# Patient Record
Sex: MALE | Race: BLACK OR AFRICAN AMERICAN | NOT HISPANIC OR LATINO
[De-identification: names, ages, dates, MRNs, and addresses within clinical notes are randomized per-mention and may not be internally consistent; named-entity substitution may affect disease eponyms.]

---

## 2018-01-09 ENCOUNTER — APPOINTMENT (OUTPATIENT)
Dept: SURGERY | Facility: CLINIC | Age: 83
End: 2018-01-09

## 2018-02-06 ENCOUNTER — APPOINTMENT (OUTPATIENT)
Dept: SURGERY | Facility: CLINIC | Age: 83
End: 2018-02-06

## 2018-02-13 ENCOUNTER — APPOINTMENT (OUTPATIENT)
Dept: SURGERY | Facility: CLINIC | Age: 83
End: 2018-02-13
Payer: COMMERCIAL

## 2018-02-13 VITALS
TEMPERATURE: 97.8 F | BODY MASS INDEX: 24.8 KG/M2 | SYSTOLIC BLOOD PRESSURE: 141 MMHG | HEIGHT: 67 IN | WEIGHT: 158 LBS | HEART RATE: 85 BPM | OXYGEN SATURATION: 99 % | DIASTOLIC BLOOD PRESSURE: 97 MMHG

## 2018-02-13 PROCEDURE — 99204 OFFICE O/P NEW MOD 45 MIN: CPT

## 2018-08-22 ENCOUNTER — EMERGENCY (EMERGENCY)
Facility: HOSPITAL | Age: 83
LOS: 1 days | Discharge: HOME | End: 2018-08-22
Attending: EMERGENCY MEDICINE | Admitting: EMERGENCY MEDICINE
Payer: MEDICAID

## 2018-08-22 VITALS
OXYGEN SATURATION: 100 % | SYSTOLIC BLOOD PRESSURE: 160 MMHG | HEART RATE: 59 BPM | DIASTOLIC BLOOD PRESSURE: 89 MMHG | RESPIRATION RATE: 16 BRPM | TEMPERATURE: 99 F

## 2018-08-22 VITALS
HEART RATE: 66 BPM | OXYGEN SATURATION: 99 % | DIASTOLIC BLOOD PRESSURE: 88 MMHG | RESPIRATION RATE: 18 BRPM | SYSTOLIC BLOOD PRESSURE: 155 MMHG

## 2018-08-22 LAB
ALBUMIN SERPL ELPH-MCNC: 4 G/DL — SIGNIFICANT CHANGE UP (ref 3.3–5)
ALP SERPL-CCNC: 56 U/L — SIGNIFICANT CHANGE UP (ref 40–120)
ALT FLD-CCNC: 10 U/L — SIGNIFICANT CHANGE UP (ref 4–41)
APPEARANCE UR: CLEAR — SIGNIFICANT CHANGE UP
AST SERPL-CCNC: 27 U/L — SIGNIFICANT CHANGE UP (ref 4–40)
BASOPHILS # BLD AUTO: 0.03 K/UL — SIGNIFICANT CHANGE UP (ref 0–0.2)
BASOPHILS NFR BLD AUTO: 0.5 % — SIGNIFICANT CHANGE UP (ref 0–2)
BILIRUB SERPL-MCNC: 0.4 MG/DL — SIGNIFICANT CHANGE UP (ref 0.2–1.2)
BILIRUB UR-MCNC: NEGATIVE — SIGNIFICANT CHANGE UP
BLOOD UR QL VISUAL: NEGATIVE — SIGNIFICANT CHANGE UP
BUN SERPL-MCNC: 12 MG/DL — SIGNIFICANT CHANGE UP (ref 7–23)
CALCIUM SERPL-MCNC: 9.5 MG/DL — SIGNIFICANT CHANGE UP (ref 8.4–10.5)
CHLORIDE SERPL-SCNC: 101 MMOL/L — SIGNIFICANT CHANGE UP (ref 98–107)
CO2 SERPL-SCNC: 25 MMOL/L — SIGNIFICANT CHANGE UP (ref 22–31)
COLOR SPEC: SIGNIFICANT CHANGE UP
CREAT SERPL-MCNC: 0.98 MG/DL — SIGNIFICANT CHANGE UP (ref 0.5–1.3)
EOSINOPHIL # BLD AUTO: 0.18 K/UL — SIGNIFICANT CHANGE UP (ref 0–0.5)
EOSINOPHIL NFR BLD AUTO: 3 % — SIGNIFICANT CHANGE UP (ref 0–6)
GLUCOSE SERPL-MCNC: 97 MG/DL — SIGNIFICANT CHANGE UP (ref 70–99)
GLUCOSE UR-MCNC: NEGATIVE — SIGNIFICANT CHANGE UP
HCT VFR BLD CALC: 40.6 % — SIGNIFICANT CHANGE UP (ref 39–50)
HGB BLD-MCNC: 13 G/DL — SIGNIFICANT CHANGE UP (ref 13–17)
IMM GRANULOCYTES # BLD AUTO: 0.01 # — SIGNIFICANT CHANGE UP
IMM GRANULOCYTES NFR BLD AUTO: 0.2 % — SIGNIFICANT CHANGE UP (ref 0–1.5)
KETONES UR-MCNC: NEGATIVE — SIGNIFICANT CHANGE UP
LEUKOCYTE ESTERASE UR-ACNC: NEGATIVE — SIGNIFICANT CHANGE UP
LYMPHOCYTES # BLD AUTO: 3.09 K/UL — SIGNIFICANT CHANGE UP (ref 1–3.3)
LYMPHOCYTES # BLD AUTO: 51.8 % — HIGH (ref 13–44)
MCHC RBC-ENTMCNC: 27.2 PG — SIGNIFICANT CHANGE UP (ref 27–34)
MCHC RBC-ENTMCNC: 32 % — SIGNIFICANT CHANGE UP (ref 32–36)
MCV RBC AUTO: 84.9 FL — SIGNIFICANT CHANGE UP (ref 80–100)
MONOCYTES # BLD AUTO: 0.41 K/UL — SIGNIFICANT CHANGE UP (ref 0–0.9)
MONOCYTES NFR BLD AUTO: 6.9 % — SIGNIFICANT CHANGE UP (ref 2–14)
NEUTROPHILS # BLD AUTO: 2.24 K/UL — SIGNIFICANT CHANGE UP (ref 1.8–7.4)
NEUTROPHILS NFR BLD AUTO: 37.6 % — LOW (ref 43–77)
NITRITE UR-MCNC: NEGATIVE — SIGNIFICANT CHANGE UP
NRBC # FLD: 0 — SIGNIFICANT CHANGE UP
PH UR: 5.5 — SIGNIFICANT CHANGE UP (ref 5–8)
PLATELET # BLD AUTO: 127 K/UL — LOW (ref 150–400)
PMV BLD: 13.2 FL — HIGH (ref 7–13)
POTASSIUM SERPL-MCNC: 4.8 MMOL/L — SIGNIFICANT CHANGE UP (ref 3.5–5.3)
POTASSIUM SERPL-SCNC: 4.8 MMOL/L — SIGNIFICANT CHANGE UP (ref 3.5–5.3)
PROT SERPL-MCNC: 8 G/DL — SIGNIFICANT CHANGE UP (ref 6–8.3)
PROT UR-MCNC: NEGATIVE — SIGNIFICANT CHANGE UP
RBC # BLD: 4.78 M/UL — SIGNIFICANT CHANGE UP (ref 4.2–5.8)
RBC # FLD: 14 % — SIGNIFICANT CHANGE UP (ref 10.3–14.5)
RBC CASTS # UR COMP ASSIST: SIGNIFICANT CHANGE UP (ref 0–?)
SODIUM SERPL-SCNC: 137 MMOL/L — SIGNIFICANT CHANGE UP (ref 135–145)
SP GR SPEC: 1.01 — SIGNIFICANT CHANGE UP (ref 1–1.04)
UROBILINOGEN FLD QL: NORMAL — SIGNIFICANT CHANGE UP
WBC # BLD: 5.96 K/UL — SIGNIFICANT CHANGE UP (ref 3.8–10.5)
WBC # FLD AUTO: 5.96 K/UL — SIGNIFICANT CHANGE UP (ref 3.8–10.5)
WBC UR QL: SIGNIFICANT CHANGE UP (ref 0–?)

## 2018-08-22 PROCEDURE — 99284 EMERGENCY DEPT VISIT MOD MDM: CPT | Mod: 25

## 2018-08-22 PROCEDURE — 71045 X-RAY EXAM CHEST 1 VIEW: CPT | Mod: 26

## 2018-08-22 RX ORDER — SODIUM CHLORIDE 9 MG/ML
1000 INJECTION INTRAMUSCULAR; INTRAVENOUS; SUBCUTANEOUS ONCE
Qty: 0 | Refills: 0 | Status: COMPLETED | OUTPATIENT
Start: 2018-08-22 | End: 2018-08-22

## 2018-08-22 RX ORDER — POLYMYXIN B SULF/TRIMETHOPRIM 10000-1/ML
2 DROPS OPHTHALMIC (EYE)
Qty: 0 | Refills: 0 | Status: DISCONTINUED | OUTPATIENT
Start: 2018-08-22 | End: 2018-08-26

## 2018-08-22 RX ADMIN — SODIUM CHLORIDE 1000 MILLILITER(S): 9 INJECTION INTRAMUSCULAR; INTRAVENOUS; SUBCUTANEOUS at 04:40

## 2018-08-22 RX ADMIN — Medication 2 DROP(S): at 04:40

## 2018-08-22 NOTE — ED ADULT NURSE NOTE - NSIMPLEMENTINTERV_GEN_ALL_ED
Implemented All Fall Risk Interventions:  [place] to call system. Call bell, personal items and telephone within reach. Instruct patient to call for assistance. Room bathroom lighting operational. Non-slip footwear when patient is off stretcher. Physically safe environment: no spills, clutter or unnecessary equipment. Stretcher in lowest position, wheels locked, appropriate side rails in place. Provide visual cue, wrist band, yellow gown, etc. Monitor gait and stability. Monitor for mental status changes and reorient to person, place, and time. Review medications for side effects contributing to fall risk. Reinforce activity limits and safety measures with patient and family.

## 2018-08-22 NOTE — ED PROVIDER NOTE - PROGRESS NOTE DETAILS
[place]: Labs, UA, XR grossly wnl. Pt completely asymptomatic, steady unassisted gait. Given copy of results, comfortable for dc, outpt pmd f/u. daughter w/ pt.  at mental status baseline.

## 2018-08-22 NOTE — ED ADULT NURSE NOTE - PRO INTERPRETER NEED 2
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  Check with your Facebook group about this as well.  I will do to Dr. Jansen about next steps with his feedings.  I will talk to home care about his hours, especially given his recent hospitalization.  I'll call you next week with an update.  
English

## 2018-08-22 NOTE — ED PROVIDER NOTE - ATTENDING CONTRIBUTION TO CARE
83M PMH HTN p/w 1d of lightheaded/dizziness, slight fall w/o trauma. Also urinary frequency and slight cough. Possible confusion now resolved. Currently asymptomatic. Vitals wnl, exam as above.  ddx: Possible metabolic vs. infectious. Also likely conjunctivitis.   CBC, cmp, EKG, UA, IVF, reassess.

## 2018-08-22 NOTE — ED PROVIDER NOTE - PHYSICAL EXAMINATION
no LE edema, normal equal distal pulses.  No spinal ttp, neck FROM. Strength 5/5. No bony ttp, FROM all extremities. Normal equal distal pulses.  b/l eyes: +conjunctival injection, slight discharge from R eye

## 2018-08-22 NOTE — ED PROVIDER NOTE - OBJECTIVE STATEMENT
83M PMH HTN p/w lightheaded/dizziness, urinary frequency. Poor historian. Pt has 1d of intermittent lightheaded. Was walking down stairs and felt lightheaded and semi-fell to the ground/against the wall. Denies hitting head or LOC or any pain from fall. Also 1d of urinary frequency. Daughter notes that pt may have been confused earlier but cant elaborate - thinks that maybe he didn't know where bathroom was. Confirms that pt is currently at baseline mental status. Pt also c/o minimal cough. Denies HA, SOB/CP, abd pain, urinary complaints, black/bloody stool, LE pain/swelling.   Lives w/ family, ambulates independently.

## 2018-08-22 NOTE — ED ADULT TRIAGE NOTE - CHIEF COMPLAINT QUOTE
pt states he felt dizzy tonight and slipped and slid down the stairs. denies LOC. denies head trauma. as per family, pt has been disoriented and incontinent of urine x3 days. pmh htn

## 2018-08-22 NOTE — ED ADULT NURSE NOTE - OBJECTIVE STATEMENT
pt arrives a*ox3 to room 2. per daughter, pt has been confused, with a fall at home last night and has been incontinent of urine x 3 days., denies any head trauma or loc, pt denies complaints at this time, flat affect noted, pt presently a*ox3, per daughter pt is becoming confused at home, and forgetting where things are placed, nad noted, vs as stated, resps even and unlabored. 22G IVSL placed labs drawn and sent. awaiting md loyola at this time, will monitor.

## 2018-08-23 LAB
BACTERIA UR CULT: SIGNIFICANT CHANGE UP
SPECIMEN SOURCE: SIGNIFICANT CHANGE UP

## 2018-09-01 ENCOUNTER — OUTPATIENT (OUTPATIENT)
Dept: OUTPATIENT SERVICES | Facility: HOSPITAL | Age: 83
LOS: 1 days | End: 2018-09-01
Payer: MEDICAID

## 2018-09-01 PROCEDURE — G9001: CPT

## 2018-09-03 ENCOUNTER — INPATIENT (INPATIENT)
Facility: HOSPITAL | Age: 83
LOS: 6 days | Discharge: NURSING, REHAB OR LONG-TERM CARE | DRG: 65 | End: 2018-09-10
Attending: PSYCHIATRY & NEUROLOGY | Admitting: PSYCHIATRY & NEUROLOGY
Payer: MEDICAID

## 2018-09-03 ENCOUNTER — EMERGENCY (EMERGENCY)
Facility: HOSPITAL | Age: 83
LOS: 1 days | Discharge: ANOTHER HOSPITAL | End: 2018-09-03
Attending: EMERGENCY MEDICINE | Admitting: EMERGENCY MEDICINE
Payer: MEDICAID

## 2018-09-03 VITALS
TEMPERATURE: 98 F | OXYGEN SATURATION: 98 % | RESPIRATION RATE: 18 BRPM | DIASTOLIC BLOOD PRESSURE: 69 MMHG | HEART RATE: 64 BPM | SYSTOLIC BLOOD PRESSURE: 126 MMHG

## 2018-09-03 VITALS
OXYGEN SATURATION: 95 % | WEIGHT: 122.14 LBS | SYSTOLIC BLOOD PRESSURE: 130 MMHG | DIASTOLIC BLOOD PRESSURE: 72 MMHG | HEART RATE: 92 BPM | RESPIRATION RATE: 18 BRPM

## 2018-09-03 VITALS
TEMPERATURE: 98 F | RESPIRATION RATE: 15 BRPM | HEART RATE: 100 BPM | SYSTOLIC BLOOD PRESSURE: 131 MMHG | DIASTOLIC BLOOD PRESSURE: 86 MMHG | OXYGEN SATURATION: 97 %

## 2018-09-03 DIAGNOSIS — I63.9 CEREBRAL INFARCTION, UNSPECIFIED: ICD-10-CM

## 2018-09-03 LAB
ALBUMIN SERPL ELPH-MCNC: 3.8 G/DL — SIGNIFICANT CHANGE UP (ref 3.3–5)
ALBUMIN SERPL ELPH-MCNC: 3.8 G/DL — SIGNIFICANT CHANGE UP (ref 3.3–5)
ALP SERPL-CCNC: 51 U/L — SIGNIFICANT CHANGE UP (ref 40–120)
ALP SERPL-CCNC: 55 U/L — SIGNIFICANT CHANGE UP (ref 40–120)
ALT FLD-CCNC: 6 U/L — LOW (ref 10–45)
ALT FLD-CCNC: 8 U/L — SIGNIFICANT CHANGE UP (ref 4–41)
ANION GAP SERPL CALC-SCNC: 16 MMOL/L — SIGNIFICANT CHANGE UP (ref 5–17)
APTT BLD: 29.2 SEC — SIGNIFICANT CHANGE UP (ref 27.5–37.4)
APTT BLD: 30.3 SEC — SIGNIFICANT CHANGE UP (ref 27.5–37.4)
AST SERPL-CCNC: 17 U/L — SIGNIFICANT CHANGE UP (ref 10–40)
AST SERPL-CCNC: 17 U/L — SIGNIFICANT CHANGE UP (ref 4–40)
BASOPHILS # BLD AUTO: 0 K/UL — SIGNIFICANT CHANGE UP (ref 0–0.2)
BASOPHILS # BLD AUTO: 0.03 K/UL — SIGNIFICANT CHANGE UP (ref 0–0.2)
BASOPHILS NFR BLD AUTO: 0.6 % — SIGNIFICANT CHANGE UP (ref 0–2)
BASOPHILS NFR BLD AUTO: 0.7 % — SIGNIFICANT CHANGE UP (ref 0–2)
BILIRUB SERPL-MCNC: 0.6 MG/DL — SIGNIFICANT CHANGE UP (ref 0.2–1.2)
BILIRUB SERPL-MCNC: 0.7 MG/DL — SIGNIFICANT CHANGE UP (ref 0.2–1.2)
BLD GP AB SCN SERPL QL: NEGATIVE — SIGNIFICANT CHANGE UP
BLD GP AB SCN SERPL QL: NEGATIVE — SIGNIFICANT CHANGE UP
BUN SERPL-MCNC: 10 MG/DL — SIGNIFICANT CHANGE UP (ref 7–23)
BUN SERPL-MCNC: 10 MG/DL — SIGNIFICANT CHANGE UP (ref 7–23)
CALCIUM SERPL-MCNC: 8.6 MG/DL — SIGNIFICANT CHANGE UP (ref 8.4–10.5)
CALCIUM SERPL-MCNC: 9 MG/DL — SIGNIFICANT CHANGE UP (ref 8.4–10.5)
CHLORIDE SERPL-SCNC: 101 MMOL/L — SIGNIFICANT CHANGE UP (ref 96–108)
CHLORIDE SERPL-SCNC: 102 MMOL/L — SIGNIFICANT CHANGE UP (ref 98–107)
CK MB BLD-MCNC: 1.16 NG/ML — SIGNIFICANT CHANGE UP (ref 1–6.6)
CK MB CFR SERPL CALC: 1.3 NG/ML — SIGNIFICANT CHANGE UP (ref 0–6.7)
CK SERPL-CCNC: 63 U/L — SIGNIFICANT CHANGE UP (ref 30–200)
CK SERPL-CCNC: 65 U/L — SIGNIFICANT CHANGE UP (ref 30–200)
CO2 SERPL-SCNC: 20 MMOL/L — LOW (ref 22–31)
CO2 SERPL-SCNC: 22 MMOL/L — SIGNIFICANT CHANGE UP (ref 22–31)
CREAT SERPL-MCNC: 0.9 MG/DL — SIGNIFICANT CHANGE UP (ref 0.5–1.3)
CREAT SERPL-MCNC: 1.01 MG/DL — SIGNIFICANT CHANGE UP (ref 0.5–1.3)
EOSINOPHIL # BLD AUTO: 0 K/UL — SIGNIFICANT CHANGE UP (ref 0–0.5)
EOSINOPHIL # BLD AUTO: 0.07 K/UL — SIGNIFICANT CHANGE UP (ref 0–0.5)
EOSINOPHIL NFR BLD AUTO: 0.2 % — SIGNIFICANT CHANGE UP (ref 0–6)
EOSINOPHIL NFR BLD AUTO: 1.5 % — SIGNIFICANT CHANGE UP (ref 0–6)
GLUCOSE SERPL-MCNC: 145 MG/DL — HIGH (ref 70–99)
GLUCOSE SERPL-MCNC: 176 MG/DL — HIGH (ref 70–99)
HCT VFR BLD CALC: 38.7 % — LOW (ref 39–50)
HCT VFR BLD CALC: 39.2 % — SIGNIFICANT CHANGE UP (ref 39–50)
HGB BLD-MCNC: 12.6 G/DL — LOW (ref 13–17)
HGB BLD-MCNC: 12.8 G/DL — LOW (ref 13–17)
IMM GRANULOCYTES # BLD AUTO: 0.01 # — SIGNIFICANT CHANGE UP
IMM GRANULOCYTES NFR BLD AUTO: 0.2 % — SIGNIFICANT CHANGE UP (ref 0–1.5)
INR BLD: 1.11 — SIGNIFICANT CHANGE UP (ref 0.88–1.17)
INR BLD: 1.16 RATIO — SIGNIFICANT CHANGE UP (ref 0.88–1.16)
LYMPHOCYTES # BLD AUTO: 1.4 K/UL — SIGNIFICANT CHANGE UP (ref 1–3.3)
LYMPHOCYTES # BLD AUTO: 1.86 K/UL — SIGNIFICANT CHANGE UP (ref 1–3.3)
LYMPHOCYTES # BLD AUTO: 25.6 % — SIGNIFICANT CHANGE UP (ref 13–44)
LYMPHOCYTES # BLD AUTO: 39.8 % — SIGNIFICANT CHANGE UP (ref 13–44)
MCHC RBC-ENTMCNC: 27.5 PG — SIGNIFICANT CHANGE UP (ref 27–34)
MCHC RBC-ENTMCNC: 27.8 PG — SIGNIFICANT CHANGE UP (ref 27–34)
MCHC RBC-ENTMCNC: 32.7 % — SIGNIFICANT CHANGE UP (ref 32–36)
MCHC RBC-ENTMCNC: 32.7 GM/DL — SIGNIFICANT CHANGE UP (ref 32–36)
MCV RBC AUTO: 84.3 FL — SIGNIFICANT CHANGE UP (ref 80–100)
MCV RBC AUTO: 85.1 FL — SIGNIFICANT CHANGE UP (ref 80–100)
MONOCYTES # BLD AUTO: 0.1 K/UL — SIGNIFICANT CHANGE UP (ref 0–0.9)
MONOCYTES # BLD AUTO: 0.23 K/UL — SIGNIFICANT CHANGE UP (ref 0–0.9)
MONOCYTES NFR BLD AUTO: 2.1 % — SIGNIFICANT CHANGE UP (ref 2–14)
MONOCYTES NFR BLD AUTO: 4.9 % — SIGNIFICANT CHANGE UP (ref 2–14)
NEUTROPHILS # BLD AUTO: 2.47 K/UL — SIGNIFICANT CHANGE UP (ref 1.8–7.4)
NEUTROPHILS # BLD AUTO: 3.8 K/UL — SIGNIFICANT CHANGE UP (ref 1.8–7.4)
NEUTROPHILS NFR BLD AUTO: 53 % — SIGNIFICANT CHANGE UP (ref 43–77)
NEUTROPHILS NFR BLD AUTO: 71.3 % — SIGNIFICANT CHANGE UP (ref 43–77)
NRBC # FLD: 0 — SIGNIFICANT CHANGE UP
PLATELET # BLD AUTO: 157 K/UL — SIGNIFICANT CHANGE UP (ref 150–400)
PLATELET # BLD AUTO: 159 K/UL — SIGNIFICANT CHANGE UP (ref 150–400)
PMV BLD: 13.3 FL — HIGH (ref 7–13)
POTASSIUM SERPL-MCNC: 3.9 MMOL/L — SIGNIFICANT CHANGE UP (ref 3.5–5.3)
POTASSIUM SERPL-MCNC: 4.4 MMOL/L — SIGNIFICANT CHANGE UP (ref 3.5–5.3)
POTASSIUM SERPL-SCNC: 3.9 MMOL/L — SIGNIFICANT CHANGE UP (ref 3.5–5.3)
POTASSIUM SERPL-SCNC: 4.4 MMOL/L — SIGNIFICANT CHANGE UP (ref 3.5–5.3)
PROT SERPL-MCNC: 7.5 G/DL — SIGNIFICANT CHANGE UP (ref 6–8.3)
PROT SERPL-MCNC: 7.7 G/DL — SIGNIFICANT CHANGE UP (ref 6–8.3)
PROTHROM AB SERPL-ACNC: 12.3 SEC — SIGNIFICANT CHANGE UP (ref 9.8–13.1)
PROTHROM AB SERPL-ACNC: 12.6 SEC — SIGNIFICANT CHANGE UP (ref 9.8–12.7)
RBC # BLD: 4.54 M/UL — SIGNIFICANT CHANGE UP (ref 4.2–5.8)
RBC # BLD: 4.65 M/UL — SIGNIFICANT CHANGE UP (ref 4.2–5.8)
RBC # FLD: 13.5 % — SIGNIFICANT CHANGE UP (ref 10.3–14.5)
RBC # FLD: 14.1 % — SIGNIFICANT CHANGE UP (ref 10.3–14.5)
RH IG SCN BLD-IMP: POSITIVE — SIGNIFICANT CHANGE UP
RH IG SCN BLD-IMP: POSITIVE — SIGNIFICANT CHANGE UP
SODIUM SERPL-SCNC: 137 MMOL/L — SIGNIFICANT CHANGE UP (ref 135–145)
SODIUM SERPL-SCNC: 139 MMOL/L — SIGNIFICANT CHANGE UP (ref 135–145)
TROPONIN T, HIGH SENSITIVITY RESULT: 9 NG/L — SIGNIFICANT CHANGE UP (ref 0–51)
TROPONIN T, HIGH SENSITIVITY: 11 NG/L — SIGNIFICANT CHANGE UP (ref ?–14)
WBC # BLD: 4.67 K/UL — SIGNIFICANT CHANGE UP (ref 3.8–10.5)
WBC # BLD: 5.3 K/UL — SIGNIFICANT CHANGE UP (ref 3.8–10.5)
WBC # FLD AUTO: 4.67 K/UL — SIGNIFICANT CHANGE UP (ref 3.8–10.5)
WBC # FLD AUTO: 5.3 K/UL — SIGNIFICANT CHANGE UP (ref 3.8–10.5)

## 2018-09-03 PROCEDURE — 70498 CT ANGIOGRAPHY NECK: CPT | Mod: 26

## 2018-09-03 PROCEDURE — 99291 CRITICAL CARE FIRST HOUR: CPT

## 2018-09-03 PROCEDURE — 93010 ELECTROCARDIOGRAM REPORT: CPT

## 2018-09-03 PROCEDURE — 71045 X-RAY EXAM CHEST 1 VIEW: CPT | Mod: 26,77

## 2018-09-03 PROCEDURE — 71045 X-RAY EXAM CHEST 1 VIEW: CPT | Mod: 26

## 2018-09-03 PROCEDURE — 70450 CT HEAD/BRAIN W/O DYE: CPT | Mod: 26,77

## 2018-09-03 PROCEDURE — 70496 CT ANGIOGRAPHY HEAD: CPT | Mod: 26

## 2018-09-03 RX ORDER — SODIUM CHLORIDE 9 MG/ML
500 INJECTION INTRAMUSCULAR; INTRAVENOUS; SUBCUTANEOUS ONCE
Qty: 0 | Refills: 0 | Status: COMPLETED | OUTPATIENT
Start: 2018-09-03 | End: 2018-09-03

## 2018-09-03 RX ORDER — ONDANSETRON 8 MG/1
4 TABLET, FILM COATED ORAL ONCE
Qty: 0 | Refills: 0 | Status: COMPLETED | OUTPATIENT
Start: 2018-09-03 | End: 2018-09-03

## 2018-09-03 RX ORDER — SODIUM CHLORIDE 9 MG/ML
1000 INJECTION INTRAMUSCULAR; INTRAVENOUS; SUBCUTANEOUS
Qty: 0 | Refills: 0 | Status: DISCONTINUED | OUTPATIENT
Start: 2018-09-03 | End: 2018-09-04

## 2018-09-03 RX ORDER — HEPARIN SODIUM 5000 [USP'U]/ML
2000 INJECTION INTRAVENOUS; SUBCUTANEOUS EVERY 6 HOURS
Qty: 0 | Refills: 0 | Status: DISCONTINUED | OUTPATIENT
Start: 2018-09-03 | End: 2018-09-04

## 2018-09-03 RX ORDER — HEPARIN SODIUM 5000 [USP'U]/ML
4000 INJECTION INTRAVENOUS; SUBCUTANEOUS EVERY 6 HOURS
Qty: 0 | Refills: 0 | Status: DISCONTINUED | OUTPATIENT
Start: 2018-09-03 | End: 2018-09-04

## 2018-09-03 RX ORDER — HEPARIN SODIUM 5000 [USP'U]/ML
INJECTION INTRAVENOUS; SUBCUTANEOUS
Qty: 25000 | Refills: 0 | Status: DISCONTINUED | OUTPATIENT
Start: 2018-09-03 | End: 2018-09-04

## 2018-09-03 RX ORDER — HEPARIN SODIUM 5000 [USP'U]/ML
4000 INJECTION INTRAVENOUS; SUBCUTANEOUS ONCE
Qty: 0 | Refills: 0 | Status: COMPLETED | OUTPATIENT
Start: 2018-09-03 | End: 2018-09-03

## 2018-09-03 RX ORDER — METOCLOPRAMIDE HCL 10 MG
10 TABLET ORAL ONCE
Qty: 0 | Refills: 0 | Status: COMPLETED | OUTPATIENT
Start: 2018-09-03 | End: 2018-09-03

## 2018-09-03 RX ADMIN — HEPARIN SODIUM 4000 UNIT(S): 5000 INJECTION INTRAVENOUS; SUBCUTANEOUS at 22:40

## 2018-09-03 RX ADMIN — HEPARIN SODIUM 1000 UNIT(S)/HR: 5000 INJECTION INTRAVENOUS; SUBCUTANEOUS at 22:41

## 2018-09-03 RX ADMIN — ONDANSETRON 4 MILLIGRAM(S): 8 TABLET, FILM COATED ORAL at 19:53

## 2018-09-03 RX ADMIN — ONDANSETRON 4 MILLIGRAM(S): 8 TABLET, FILM COATED ORAL at 17:31

## 2018-09-03 RX ADMIN — SODIUM CHLORIDE 500 MILLILITER(S): 9 INJECTION INTRAMUSCULAR; INTRAVENOUS; SUBCUTANEOUS at 17:31

## 2018-09-03 NOTE — CONSULT NOTE ADULT - SUBJECTIVE AND OBJECTIVE BOX
MELONY [id number] is a 83y old  Male who presents with a chief complaint of     HPI:          MEDICATIONS  (STANDING):    MEDICATIONS  (PRN):    PAST MEDICAL & SURGICAL HISTORY:    FAMILY HISTORY:    Allergies    No Known Allergies    Intolerances        SHx - No smoking, No ETOH, No drug abuse      Review of Systems:  CONSTITUTIONAL:   HEENT:  No visual loss, blurred vision, double vision.  No hearing loss, sneezing, congestion, runny nose or sore throat.  SKIN:  No rash or itching.  CARDIOVASCULAR:  No chest pain, chest pressure or chest discomfort. No palpitations or edema.  RESPIRATORY:  No shortness of breath, cough or sputum.  GASTROINTESTINAL:  No anorexia, nausea, vomiting or diarrhea. No abdominal pain.  GENITOURINARY:  NO dysuria. No increased frequency. No retention. No incontinence.  NEUROLOGICAL:  See HPI  MUSCULOSKELETAL:  No muscle, back pain, joint pain or stiffness.  HEMATOLOGIC:  No anemia, bleeding or bruising.  PSYCHIATRIC:    ENDOCRINOLOGIC:  No reports of sweating, cold or heat intolerance. No polyuria or polydipsia.        Vital Signs Last 24 Hrs  T(C): 36.8 (03 Sep 2018 15:46), Max: 36.8 (03 Sep 2018 14:57)  T(F): 98.2 (03 Sep 2018 15:46), Max: 98.2 (03 Sep 2018 14:57)  HR: 62 (03 Sep 2018 15:46) (62 - 64)  BP: 135/74 (03 Sep 2018 15:46) (126/69 - 135/74)  BP(mean): --  RR: 16 (03 Sep 2018 15:46) (16 - 18)  SpO2: 100% (03 Sep 2018 15:46) (98% - 100%)    General Exam:   General appearance: No acute distress    Cardiac:  Pulm:                 Neurological Exam:  Mental Status: Orientated to self, date and place.  Attention intact.  No dysarthria. Speech fluent.  Cranial Nerves:   PERRL, EOMI, VFF, no nystagmus.    CN V1-3 intact to light touch .  No facial asymmetry.  Hearing intact to finger rub bilaterally.  Tongue, uvula and palate midline.  Sternocleidomastoid and Trapezius intact bilaterally.    Motor:   Tone: normal.                  Strength:     [] Upper extremity                      Delt       Bicep    Tricep                                                  R         5/5        5/5        5/5       5/5                                               L          5/5        5/5        5/5       5/5  [] Lower extremity                       HF          KE          KF        DF         PF                                               R        5/5 5/5 5/5 5/5 5/5                                               L         5/5 5/5 5/5 5/5 5/5  Pronator drift: none                 Dysmetria: None to finger-nose-finger or heel-shin-heel  No truncal ataxia.    Tremor: No resting, postural or action tremor.  No myoclonus.    Sensation: intact to light touch, pinprick, vibration and proprioception    Deep Tendon Reflexes:     Biceps          Triceps      BR        Patellar        Ankle         Babinski                                         R       2+                   2+           2+            2+               2+           downgoing                                         L        2+                  2+           2+            2+               2+           downgoing    Gait: normal.      Other:                Radiology    CT:   MRI  EKG:  tele:  TTE:  EEG: MELONY [id number] is a 83y old  Male who presents with a chief complaint of     HPI: 83M h/o HTN brought in for change in mental status approximately 30min prior to ED arrival.  Pt noted to have slumped over to left side, with slurred speech, and a "pulling" of facial muscles.  Stroke code called in triage.  Pt also endorsing chest pain. Endorses dizziness. No headache. No diplopia, +blurry vision        MEDICATIONS  (STANDING):    MEDICATIONS  (PRN):    PAST MEDICAL & SURGICAL HISTORY:    FAMILY HISTORY:    Allergies    No Known Allergies    Intolerances        SHx - No smoking, No ETOH, No drug abuse      Review of Systems:  CONSTITUTIONAL:   HEENT:  No visual loss, blurred vision, double vision.  No hearing loss, sneezing, congestion, runny nose or sore throat.  SKIN:  No rash or itching.  CARDIOVASCULAR:  No chest pain, chest pressure or chest discomfort. No palpitations or edema.  RESPIRATORY:  No shortness of breath, cough or sputum.  GASTROINTESTINAL:  No anorexia, nausea, vomiting or diarrhea. No abdominal pain.  GENITOURINARY:  NO dysuria. No increased frequency. No retention. No incontinence.  NEUROLOGICAL:  See HPI  MUSCULOSKELETAL:  No muscle, back pain, joint pain or stiffness.  HEMATOLOGIC:  No anemia, bleeding or bruising.  PSYCHIATRIC:    ENDOCRINOLOGIC:  No reports of sweating, cold or heat intolerance. No polyuria or polydipsia.        Vital Signs Last 24 Hrs  T(C): 36.8 (03 Sep 2018 15:46), Max: 36.8 (03 Sep 2018 14:57)  T(F): 98.2 (03 Sep 2018 15:46), Max: 98.2 (03 Sep 2018 14:57)  HR: 62 (03 Sep 2018 15:46) (62 - 64)  BP: 135/74 (03 Sep 2018 15:46) (126/69 - 135/74)  BP(mean): --  RR: 16 (03 Sep 2018 15:46) (16 - 18)  SpO2: 100% (03 Sep 2018 15:46) (98% - 100%)    General Exam:   General appearance: No acute distress    Cardiac:  Pulm:                 Neurological Exam:  Mental Status: Orientated to self, date and place.  Attention intact.  No dysarthria. Speech fluent.  Cranial Nerves:   PERRL, EOMI, VFF, no nystagmus.    CN V1-3 intact to light touch .  No facial asymmetry.  Hearing intact to finger rub bilaterally.  Tongue, uvula and palate midline.  Sternocleidomastoid and Trapezius intact bilaterally.    Motor:   Tone: normal.                  Strength:     [] Upper extremity                      Delt       Bicep    Tricep                                                  R         5/5 5/5 5/5 5/5                                               L          5/5 5/5 5/5 5/5  [] Lower extremity                       HF          KE          KF        DF         PF                                               R        5/5 5/5 5/5 5/5 5/5                                               L         5/5 5/5 5/5 5/5 5/5  Pronator drift: none                 Dysmetria: None to finger-nose-finger or heel-shin-heel  No truncal ataxia.    Tremor: No resting, postural or action tremor.  No myoclonus.    Sensation: intact to light touch, pinprick, vibration and proprioception    Deep Tendon Reflexes:     Biceps          Triceps      BR        Patellar        Ankle         Babinski                                         R       2+                   2+           2+            2+               2+           downgoing                                         L        2+                  2+           2+            2+               2+           downgoing    Gait: normal.      Other:                Radiology    CT:   MRI  EKG:  tele:  TTE:  EEG: MELONY [id number] is a 83y old  Male who presents with a chief complaint of     HPI: 83M h/o HTN brought in for change in mental status approximately 30min prior to ED arrival.  Pt noted to have slumped over to left side, with slurred speech, and a "pulling" of facial muscles.  Stroke code called in triage.  Pt also endorsing chest pain. Endorses dizziness. No headache. No diplopia, +blurry vision. NIHSS 0. MRS 0.    MEDICATIONS  (STANDING):    MEDICATIONS  (PRN):    PAST MEDICAL & SURGICAL HISTORY:    FAMILY HISTORY:    Allergies    No Known Allergies    Intolerances      Review of Systems:    see hpi      Vital Signs Last 24 Hrs  T(C): 36.8 (03 Sep 2018 15:46), Max: 36.8 (03 Sep 2018 14:57)  T(F): 98.2 (03 Sep 2018 15:46), Max: 98.2 (03 Sep 2018 14:57)  HR: 62 (03 Sep 2018 15:46) (62 - 64)  BP: 135/74 (03 Sep 2018 15:46) (126/69 - 135/74)  BP(mean): --  RR: 16 (03 Sep 2018 15:46) (16 - 18)  SpO2: 100% (03 Sep 2018 15:46) (98% - 100%)    General Exam:   General appearance: No acute distress          Neurological Exam:    Mental Status: Orientated to self, date and place.  Attention intact.  No dysarthria. Speech fluent.  Cranial Nerves: EOMI, VFF, no nystagmus. CN V1-3 intact to light touch.  No facial asymmetry. Tongue, uvula and palate midline. Sternocleidomastoid and Trapezius intact bilaterally.    Motor:     [] Upper extremity                      Delt       Bicep    Tricep                                                  R         5/5        5/5        5/5       5/5                                               L          5/5        5/5        5/5       5/5  [] Lower extremity                       HF          KE          KF        DF         PF                                               R        5/5        5/5        5/5       5/5       5/5                                               L         5/5        5/5       5/5       5/5        5/5  Pronator drift: none                 Dysmetria: None to finger-nose-finger or heel-shin-heel  No truncal ataxia.    Tremor: No resting, postural or action tremor.  No myoclonus.  Sensation: intact to light touch  Gait: normal.      Other:                Radiology    CT:     < from: CT Head No Cont (09.03.18 @ 15:23) >  FINDINGS:     No acute intracranial hemorrhage, extra-axial collection, vasogenic   edema, hydrocephalus, mass effect or midline shift. Patchy areas of white   matter hypodensity are seen compatible with microvascular-type changes.   Gray-white matter differentiation is preserved.    Age-related involutional and microvascular changes of the brain   parenchyma.    The visualized paranasal sinuses, mastoid air cells and middle ear   cavities are clear.    The soft tissues of the scalp are unremarkable. The calvarium is intact.    IMPRESSION:     No acute intracranial hemorrhage, brain edema or mass effect.      < end of copied text >    MRI  EKG:  tele:  TTE:  EEG:

## 2018-09-03 NOTE — H&P ADULT - ASSESSMENT
Ischemic stroke    - Admit   - C/W telemetry      - F/U EKG  - C/W ASA 81 daily /Statin 80 mg daily /Heparin SQ for VTE prophylaxis  - F/U TTE  - F/U carotid Duplex    - MRI head non contrast  - MRA H&N  - Repeat CTH in 24 hours   - CBC AM daily  - BMP AM daily  - Lipid panel  - HbA1C  - PT/OT  - Dysphagia screen if fails, Swallow evaluation  - Permissive HTN for 24 hours up to 185/105  - Heparin Load and Infusion.   - IVF with 180-250cc bolus to maintain BP Ischemic stroke    - Heparin Load and Infusion.   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 Patient presenting acutely and severely dysarthric with right sided weakness, left forced gaze. Patient accompanied by daughter who reports patient is fully functional at baseline.   In the ED, CTH NAD. CTA shows distal flow past proximal basilar/vertebral occlusion. Patient's daughter reports no fevers, chill, ns, cp, sob, abd pain, n/v/d/c, or changes in senses of patient.   NIHSS 8 MRS 0.     Ischemic stroke    - Heparin Load and Infusion.   - Admit   - C/W telemetry      - F/U EKG  - C/W ASA 81 daily /Statin 80 mg daily /Heparin SQ for VTE prophylaxis  - F/U TTE  - F/U carotid Duplex    - MRI head non contrast  - MRA H&N  - Repeat CTH in 24 hours   - CBC AM daily  - BMP AM daily  - Lipid panel  - HbA1C  - PT/OT  - Dysphagia screen if fails, Swallow evaluation  - Permissive HTN for 24 hours up to 185/105  - IVF with 180-250cc bolus to maintain BP

## 2018-09-03 NOTE — ED PROVIDER NOTE - CARE PLAN
Principal Discharge DX:	Slurred speech Principal Discharge DX:	Cerebrovascular accident (CVA), unspecified mechanism

## 2018-09-03 NOTE — H&P ADULT - HISTORY OF PRESENT ILLNESS
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 Patient presenting acutely and severely dysarthric with right sided weakness, left forced gaze. Patient accompanied by daughter who reports patient is fully functional at baseline.   In the ED, CTH NAD. CTA shows proximal flow of  NIHSS 8 MRS 0. 54yo black man with a PMHx of HTN presents to NS as a transfer from [facility] for basilar/vert occlusion. Patient presenting acutely and severely dysarthric with right sided weakness, left forced gaze. Patient accompanied by daughter who reports patient is fully functional at baseline.   In the ED, CTH NAD. CTA shows distal flow past proximal basilar/vertebral occlusion. Patient's daughter reports no fevers, chill, ns, cp, sob, abd pain, n/v/d/c, or changes in senses of patient.   NIHSS 8 MRS 0.

## 2018-09-03 NOTE — ED PROVIDER NOTE - PROGRESS NOTE DETAILS
ED Attending: Phillip  Pt signed out to me from Dr. Malik to f.u and reassess,  CTA showed apparent acute vertebral and basilar occlusions and pt is with stable mental status but persistent vomiting.  Is alert and oriented to self only. Abd soft and no tender, no guarding.  Given CTA fiundings and physicial sx neuro recc xfer to [facility] for possible neuro intervention.  Acepted by Dr. Reed and Dr. Lynn.  While EMS loading pt Neuro recc Heparin.  As it would delay xfer by approx 20 min, will hold heparin for now and may be started at [facility].  Neuro in agreement, I also d/w Stroke fellow by phone.

## 2018-09-03 NOTE — H&P ADULT - NSHPPHYSICALEXAM_GEN_ALL_CORE
Constitutional: awake and alert.  HEENT: PERRLA, EOMI,   Neck: Supple.  Respiratory: Breath sounds are clear bilaterally  Cardiovascular: S1 and S2, regular / irregular rhythm  Gastrointestinal: soft, nontender  Extremities:  no edema  Vascular: Caritid Bruit - no  Musculoskeletal: no joint swelling/tenderness, no abnormal movements  Skin: No rashes    Neurological exam:  HF: Alert. Unable to determine orientation. Follows commands. Acutely dysarthric.   CN: Forced left gaze. MICHELLE, EOMI,   Motor: Drift and weakness noted in right UE/LE. Strength 5/5 in LUE/LLE.   Sens: unable to assess.   Reflexes: Brisk, 3+ throughout, Upgoing Babinski sign.   Coord:  Acutely dysmetric on right hand.   Gait/Balance: Unable to assess.    NIHSS: 8

## 2018-09-03 NOTE — ED ADULT NURSE NOTE - NSIMPLEMENTINTERV_GEN_ALL_ED
Implemented All Fall with Harm Risk Interventions:  [place] to call system. Call bell, personal items and telephone within reach. Instruct patient to call for assistance. Room bathroom lighting operational. Non-slip footwear when patient is off stretcher. Physically safe environment: no spills, clutter or unnecessary equipment. Stretcher in lowest position, wheels locked, appropriate side rails in place. Provide visual cue, wrist band, yellow gown, etc. Monitor gait and stability. Monitor for mental status changes and reorient to person, place, and time. Review medications for side effects contributing to fall risk. Reinforce activity limits and safety measures with patient and family. Provide visual clues: red socks.

## 2018-09-03 NOTE — ED PROVIDER NOTE - PHYSICAL EXAMINATION
***GEN - well appearing; NAD   ***HEAD - NC/AT  ***EYES/NOSE - PEERL, EOMI, mucous membranes moist, no discharge   ***THROAT: Oral cavity and pharynx normal. No inflammation, swelling, exudate, or lesions.    ***NECK: supple, non-tender no lymphadenopathy  ***PULMONARY - CTA b/l, symmetric breath sounds.   ***CARDIAC- s1s2, RRR, no murmur  ***ABDOMEN - +BS, ND, NT, soft, no guarding, no rebound, no organomegaly  ***BACK - no CVA tenderness, Normal  spine, no spinal TTP  ***EXTREMITIES - symmetric pulses, 2+ dp, capillary refill < 2 seconds, no clubbing, no cyanosis, no edema   ***SKIN - warm, dry, intact, no rash or bruising   ***NEUROLOGIC -  Mental Status: Orientated to self, date and place.  Attention intact.  No dysarthria. Speech fluent. Cranial Nerves: EOMI, VFF, no nystagmus. CN V1-3 intact to light touch.  No facial asymmetry. Tongue, uvula and palate midline. Sternocleidomastoid and Trapezius intact bilaterally. Dysmetria: None to finger-nose-finger or heel-shin-heel No truncal ataxia.   Tremor: No resting, postural or action tremor.  No myoclonus. Sensation: intact to light touch Gait: normal.  Normal motor and sensation.

## 2018-09-03 NOTE — ED ADULT NURSE REASSESSMENT NOTE - NS ED NURSE REASSESS COMMENT FT1
Pt reassessed by overnight covering RN - pt had multiple episodes nbnb emesis in ED at change of shift, pt speech is slow, garbled, difficult to understand, pt briefly unresponsive to tactile/voice stimulus.  MD Fisher brought to bedside for assessment, pt increasingly responsive.  Pt is to be transferred to [place] neuro services, EMS dispatch notified and transfer ctr notified to be done emergently.  Neuro resident @ bedside discussing starting heparin drip on pt, advised w/o repeat CT images I am uncomfortable starting drip at this moment.  Conference w/ ED MD Fisher, EMS transport  at bedside, plan for pt to be transferred to [facility] and will continue evaluation there.  Pt and family updated to plan of care, vitally stable.  Pt aao2 (self, situation) but difficult to understand speech, follows commands temporarily but w/ increasing difficulty.   Pt transported out w./ EMS to [facility] and family.

## 2018-09-03 NOTE — CONSULT NOTE ADULT - ASSESSMENT
Impression:     Plan: 83M h/o HTN brought in for change in mental status approximately 30min prior to ED arrival.  Pt noted to have slumped over to left side, with slurred speech, and a "pulling" of facial muscles.  Stroke code called in triage.  Pt also endorsing chest pain. Endorses dizziness. No headache. No diplopia, +blurry vision.    Impression: Acute onset of slurred speech in patient with hx of HTN is concerning for acute stroke vs TIA. Would need to r/o acute stroke as well as complete stroke workup.    Plan:  - can admit to CDU  - Neuro Checks Q4hr  - MRI Head w/o  - MRA Head w/o  - MRA Neck w/  - TTE w/ bubble study  - Hypercoagulable Work Up  - PT/OT  - Dysphagia Screen, if fails then Speech and Swallow Eval  - Start Aspirin 81mg PO QD and Lipitor 80mg PO HS when passed dysphagia  - Lipid Panel and HbA1c  - Permissive HTN for 24 hours  - DVT ppx

## 2018-09-03 NOTE — ED ADULT TRIAGE NOTE - CHIEF COMPLAINT QUOTE
Family reports pt has been feeling generally weak lately.  "hes been sleeping so much."  Today family states pt had episode of slurred speech and pt slummed over and c/o chest pain about 20 min ago.  Family denies syncope.    Speech clear in triage.  Pt denies chest pain in triage. Pt is oriented x 2.  appears generally weak.  h/o HTN, Family reports pt has been feeling generally weak lately.  "he's been sleeping so much."  Today family states pt had episode of slurred speech and pt slummed over and c/o chest pain about 20 min ago.  Family denies syncope.    Speech clear in triage.  Pt denies chest pain in triage. Pt is oriented x 2.  appears generally weak.  h/o HTN,  FIT MD out to triage for quick stroke eval.  Stroke code called.

## 2018-09-03 NOTE — ED PROVIDER NOTE - CARE PLAN
Principal Discharge DX:	CVA (cerebral vascular accident)  Secondary Diagnosis:	Basilar artery occlusion

## 2018-09-03 NOTE — ED ADULT NURSE NOTE - NSIMPLEMENTINTERV_GEN_ALL_ED
Implemented All Fall Risk Interventions:  [place] to call system. Call bell, personal items and telephone within reach. Instruct patient to call for assistance. Room bathroom lighting operational. Non-slip footwear when patient is off stretcher. Physically safe environment: no spills, clutter or unnecessary equipment. Stretcher in lowest position, wheels locked, appropriate side rails in place. Provide visual cue, wrist band, yellow gown, etc. Monitor gait and stability. Monitor for mental status changes and reorient to person, place, and time. Review medications for side effects contributing to fall risk. Reinforce activity limits and safety measures with patient and family.

## 2018-09-03 NOTE — ED ADULT NURSE REASSESSMENT NOTE - NS ED NURSE REASSESS COMMENT FT1
pt began vomiting, medicated as ordered, Neurology currently at bedside for reassessment, will continue to monitor

## 2018-09-03 NOTE — ED ADULT NURSE REASSESSMENT NOTE - NS ED NURSE REASSESS COMMENT FT1
pt had one episode of n/v, medicated as ordered, pt awake, a/ox2, vitals as noted, pt in CT, will continue to monitor

## 2018-09-03 NOTE — H&P ADULT - NSHPLABSRESULTS_GEN_ALL_CORE
LABS:                        12.5   6.0   )-----------( 147      ( 04 Sep 2018 04:40 )             37.8     04 Sep 2018 00:41    139    |  103    |  10     ----------------------------<  130    4.1     |  24     |  0.91     Ca    8.7        04 Sep 2018 00:41    TPro  7.6    /  Alb  3.9    /  TBili  0.6    /  DBili  x      /  AST  14     /  ALT  5      /  AlkPhos  52     04 Sep 2018 00:41    PT/INR - ( 04 Sep 2018 00:41 )   PT: 13.2 sec;   INR: 1.22 ratio         PTT - ( 04 Sep 2018 04:16 )  PTT:25.5 sec  CSF:              < from: CT Brain Stroke Protocol (09.03.18 @ 21:27) >    IMPRESSION:   No acute intracranial hemorrhage, brain edema, or mass effect. MRI may be   performed for more sensitive evaluation of acute ischemia, as clinically   warranted, if no contraindications exist.    Findings were discussed by Dr. Ferguson with Dr. Villagran on 9/3/2018 9:28 PM   with readback.      MENDY FERGUSON M.D., RADIOLOGY RESIDENT  This document has been electronically signed.  BHAVESH MCDOWELL M.D., ATTENDING RADIOLOGIST  This document has been electronically signed. Sep  3 2018 10:09PM    < end of copied text >

## 2018-09-03 NOTE — ED PROVIDER NOTE - ATTENDING CONTRIBUTION TO CARE
Dr. Alcazar:  I have personally performed a face to face bedside history and physical examination of this patient. I have discussed the history, examination, review of systems, assessment and plan of management with the resident. I have reviewed the electronic medical record and amended it to reflect my history, review of systems, physical exam, assessment and plan. Dr. Alcazar:  I have personally performed a face to face bedside history and physical examination of this patient. I have discussed the history, examination, review of systems, assessment and plan of management with the resident. I have reviewed the electronic medical record and amended it to reflect my history, review of systems, physical exam, assessment and plan.    83M h/o HTN brought in for change in mental status approximately 30min prior to ED arrival.  Pt noted to have slumped over to left side, with slurred speech, and a "pulling" of facial muscles.  Stroke code called in triage.  Pt also endorsing chest pain.    Exam:  - nad  - rrr  - ctab   -abd soft ntnd  - speech still slightly slurred as per family member, otherwise no focal deficits on prelim exam    A/P  - eval stroke, ACS  - cbc, cmp, trop, coags  - ekg  - ct head

## 2018-09-03 NOTE — ED PROVIDER NOTE - OBJECTIVE STATEMENT
83M h/o HTN brought in for change in mental status approximately 30min prior to ED arrival.  Patient suddenly slumped over to left side, with slurred speech, and a "pulling" of facial muscles.  Pt also complaining of chest pain with dizziness. No headache and blurry vision.

## 2018-09-03 NOTE — STROKE CODE NOTE - NIH STROKE SCALE: 9. BEST LANGUAGE, QM
(1) Mild-to-moderate aphasia; some obvious loss of fluency or facility of comprehension, without significant limitation on ideas expressed or form of expression. Reduction of speech and/or comprehension, however, makes conversation about provided material difficult or impossible. For example, in conversation about provided materials, examiner can identify picture or naming card content from patient's response. (0) No aphasia; normal

## 2018-09-03 NOTE — ED ADULT NURSE NOTE - OBJECTIVE STATEMENT
82 yo M pmh of HTN transfer from [facility] to [facility] ED stroke rescue at 20:58.  As per EMS pt had been brought to [facility] for onset of neuro symptoms, impaired gait and confusion noted at 15:00.  When pt was in [facility] ED pt, staff noted a decline in neuro status at 20:00.  CODE STROKE called at 21:00, pt brought to CT straight from ED.  bloods drawn and sent to lab.  Pt has 2 peripheral IVs placed at previous facility.  Neuro stroke scale 12 as per neurology.  Safety and comfort maintained. Family present at bedside.  Will continue to monitor.

## 2018-09-03 NOTE — H&P ADULT - ATTENDING COMMENTS
I have seen and examined this patient with the stroke neurology team.     History was reviewed with the patient and/or available family members.   ROS: All negative except documented in the HPI.   Neurological exam was performed and agree with exam as documented above.   Laboratory results and imaging studies were reviewed by me.   I agree with the neurology resident note as documented above.    53 years old man with multiple vascular risk factors including age and HTN is evaluated at [facility] for acute onset of dysarthria. On 9/3, he presented to [facility] for acute onset of dysarthria and was subsequently transferred to [facility] for further management of "CTA findings". CT brain on 9/3 did not show any evidence of acute infarct or hemorrhage. CTA head and neck showed complete versus partially occlusive thrombus in distal (bilateral) vertebral arteries (V4 segment), proximal and mid basilar artery with distal reconstitution probably through leptomeningeal collaterals without significant extracranial cerebral large vessel severe stenosis or occlusion. MRI brain on 9/4 showed bilateral PICA distribution and left PCA distribution punctate infarcts and severe leukoaraiosis we have I have seen and examined this patient with the stroke neurology team.     History was reviewed with the patient and/or available family members.   ROS: All negative except documented in the HPI.   Neurological exam was performed and agree with exam as documented above.   Laboratory results and imaging studies were reviewed by me.   I agree with the neurology resident note as documented above.    53 years old man with multiple vascular risk factors including age and HTN is evaluated at [facility] for acute onset of dysarthria. On 9/3, he presented to [facility] for acute onset of dysarthria and was subsequently transferred to [facility] for further management of "CTA findings". CT brain on 9/3 did not show any evidence of acute infarct or hemorrhage. CTA head and neck showed complete versus partially occlusive thrombus in distal (bilateral) vertebral arteries (V4 segment), proximal and mid basilar artery with distal reconstitution probably through leptomeningeal collaterals without significant extracranial cerebral large vessel severe stenosis or occlusion. MRI brain on 9/4 showed bilateral PICA distribution and left PCA distribution punctate infarcts and severe leukoaraiosis.     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 symptomatic intracranial atherosclerosis with superimposed local thrombosis (vertebral/basilar thrombosis) and artery to artery embolism    Plan:  - Aspirin for secondary stroke prevention  - Aspirin and clopidogrel for aggressive secondary stroke prevention considering the results of CHANCE stroke trial for 3 weeks followed by single antiplatelet agent   - Aspirin and clopidogrel for aggressive secondary stroke prevention for 3 months considering symptomatic intracranial large vessel severe atherosclerotic stenosis followed by single antiplatelet agent    - Therapeutic anticoagulation ([id number] score>1 and non-valvular atrial fibrillation) with apixaban for secondary stroke prevention  - No indications to add antiplatelet therapy to therapeutic anticoagulation unless patient has history of CAD or cardiac stents    - Agree with pharmacological DVT prophylaxis   - SCDs for the DVT prophylaxis for now and consider pharmacological DVT prophylaxis in about 48-72 hours based on clinical and radiological improvement     - Atorvastatin 80 mg at bedtime once able to pass the swallow evaluation or enteral access is established; titrate the dose according to LDL    - HbA1C and LDL, continue with aggressive vascular risk factors modifications     - Permissive HTN up to 220/110 mmHg for first 48-72 hours from symptom onset followed by gradual normotension  - BP goal – gradual normotension     - MRI brain without contrast   - MRA head and neck to evaluate for intracranial and extracranial cerebral vasculature   - Repeat CT brain in about 24-48 hours   - CTA head and neck to evaluate for intracranial and extracranial cerebral vasculature    - TTE with bubble study  XX  RUT with bubble study and continue with telemetry to screen for possible cardiac source of embolism  - Prolonged cardiac monitoring with ICM to screen for occult cardiac arrhythmia like atrial fibrillation being the cause of possible cardiac source of embolism to be considered based on results of above mentioned cardiac tests      - Hypercoagulable work up     - PT/OT/Speech and swallow/safety eval    Above mentioned plan was discussed with patient and available family member in detail. All the questions were answered and concerns were addressed I have seen and examined this patient with the stroke neurology team.     History was reviewed with the patient and/or available family members.   ROS: All negative except documented in the HPI.   Neurological exam was performed and agree with exam as documented above.   Laboratory results and imaging studies were reviewed by me.   I agree with the neurology resident note as documented above.    53 years old man with multiple vascular risk factors including age and HTN is evaluated at [facility] for acute onset of dysarthria. On 9/3, he presented to [facility] for acute onset of dysarthria and was subsequently transferred to [facility] for further management of "CTA findings". CT brain on 9/3 did not show any evidence of acute infarct or hemorrhage. 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 MRI brain on 9/4 showed bilateral PICA distribution and left PCA distribution punctate infarcts and severe leukoaraiosis.     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 symptomatic intracranial atherosclerosis with superimposed local thrombosis (vertebral/basilar thrombosis) and artery to artery embolism and is likely to be secondary to embolism from a proximal source like cardiac/paradoxical source of embolism; which needs to be screened for    Plan:  - Not a candidate for mechanical embolectomy due to stable/improving neurological examination   - Agree to continue with cautious therapeutic anticoagulation with heparin drip with goal PTT 50-70 as benefits of therapeutic anticoagulation in the setting of basilar thrombosis would outweigh potential risks including hemorrhagic transformation of ischemic stroke. Risks versus benefits and adverse reaction/complication associated with therapeutic anticoagulation in this patient with acute stroke were discussed with patient and available family members in detail. Duration of therapeutic anticoagulation is expected to be 3 months based on clinical and radiological stability/improvement  - No indications to add antiplatelet therapy in addition to therapeutic anticoagulation unless he has a history of CAD of cardiac stents  - Atorvastatin 80 mg at bedtime once able to pass the swallow evaluation or enteral access is established considering likely atheroembolic etiology of his stroke and age  - HbA1C 5.6 and , continue with aggressive vascular risk factors modifications   - Permissive HTN up to 180/105 mmHg (in the setting of therapeutic anticoagulation) for first 48-72 hours from symptom onset followed by gradual normotension  - TTE with bubble study and continue with telemetry to screen for possible cardiac source of embolism  - Prolonged cardiac monitoring with ICM to screen for occult cardiac arrhythmias like atrial fibrillation being the cardiac source of embolism to be considered based on results of above mentioned tests  - PT/OT/Speech and swallow/safety eval  - Stroke education  - Sleep study to rule out possible obstructive sleep apnea to be performed as outpatient    Above mentioned plan was discussed with patient and available family member in detail. All the questions were answered and concerns were addressed. 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

## 2018-09-03 NOTE — ED PROVIDER NOTE - ATTENDING CONTRIBUTION TO CARE
Attending MD Drake:  I personally have seen and examined this patient.  Resident note reviewed and agree on plan of care and except where noted.  See HPI, PE, and MDM for details.           83M transferred from OSH with concern for basilar occlusion, unclear last normal so no IV tPA administered, transferred to this ED for consideration of endovascular therapy for CVA. Code stroke activated upon arrival to this ED, protecting airway at this time. Plan for CT perfusion, disposition to stroke unit.

## 2018-09-03 NOTE — ED ADULT NURSE NOTE - OBJECTIVE STATEMENT
pt brought to room 3 as code stroke- pts family states they noted slurred speech today, unsure of what time, and found the pt slumped over in chair, denies LOC but noticed the pt was unusually weak- pt currently awake, a/ox2, and mild sensory loss and speech is slower as per son. pt denies any CP, SOB, n/v/d, visual changes, as per family the pts vision is always "off". 20G IV placed to left hand, blood work sent- neurology at bedside, will continue to monitor pt brought to room 3 as code stroke- pts family states they noted slurred speech today, unsure of what time, and found the pt slumped over in chair, denies LOC but noticed the pt was unusually weak- pt currently awake, a/ox2, speech is slower as per son. pt denies any CP, SOB, n/v/d, visual changes, as per family the pts vision is always "off". 20G IV placed to left hand, blood work sent- neurology at bedside, will continue to monitor

## 2018-09-03 NOTE — ED PROVIDER NOTE - MEDICAL DECISION MAKING DETAILS
Code stroke called in triage, plan for stroke hurtado, acs hurtado, monitor, neuro consult, ct head and cta head and neck.  vss.

## 2018-09-03 NOTE — ED PROVIDER NOTE - OBJECTIVE STATEMENT
84yo m transferred from [facility] for endovascular intervention for basilar artery occlusion. initially presented to [facility] w/ syncope 84yo m pmh htn transferred from [facility] for possible endovascular intervention for basilar artery occlusion. initially presented to [facility] w/ ams, slurred speech. last known well 2.30pm.

## 2018-09-03 NOTE — ED ADULT NURSE NOTE - CHIEF COMPLAINT QUOTE
Family reports pt has been feeling generally weak lately.  "he's been sleeping so much."  Today family states pt had episode of slurred speech and pt slummed over and c/o chest pain about 20 min ago.  Family denies syncope.    Speech clear in triage.  Pt denies chest pain in triage. Pt is oriented x 2.  appears generally weak.  h/o HTN,  FIT MD out to triage for quick stroke eval.  Stroke code called.

## 2018-09-04 LAB
ALBUMIN SERPL ELPH-MCNC: 3.9 G/DL — SIGNIFICANT CHANGE UP (ref 3.3–5)
ALP SERPL-CCNC: 52 U/L — SIGNIFICANT CHANGE UP (ref 40–120)
ALT FLD-CCNC: 5 U/L — LOW (ref 10–45)
ANION GAP SERPL CALC-SCNC: 12 MMOL/L — SIGNIFICANT CHANGE UP (ref 5–17)
APTT BLD: 136.1 SEC — CRITICAL HIGH (ref 27.5–37.4)
APTT BLD: 25.5 SEC — LOW (ref 27.5–37.4)
APTT BLD: 85.9 SEC — HIGH (ref 27.5–37.4)
APTT BLD: 98.8 SEC — HIGH (ref 27.5–37.4)
APTT BLD: 99.6 SEC — HIGH (ref 27.5–37.4)
AST SERPL-CCNC: 14 U/L — SIGNIFICANT CHANGE UP (ref 10–40)
BILIRUB SERPL-MCNC: 0.6 MG/DL — SIGNIFICANT CHANGE UP (ref 0.2–1.2)
BUN SERPL-MCNC: 10 MG/DL — SIGNIFICANT CHANGE UP (ref 7–23)
CALCIUM SERPL-MCNC: 8.7 MG/DL — SIGNIFICANT CHANGE UP (ref 8.4–10.5)
CHLORIDE SERPL-SCNC: 103 MMOL/L — SIGNIFICANT CHANGE UP (ref 96–108)
CHOLEST SERPL-MCNC: 158 MG/DL — SIGNIFICANT CHANGE UP (ref 10–199)
CK MB CFR SERPL CALC: 1.6 NG/ML — SIGNIFICANT CHANGE UP (ref 0–6.7)
CK SERPL-CCNC: 63 U/L — SIGNIFICANT CHANGE UP (ref 30–200)
CO2 SERPL-SCNC: 24 MMOL/L — SIGNIFICANT CHANGE UP (ref 22–31)
CREAT SERPL-MCNC: 0.91 MG/DL — SIGNIFICANT CHANGE UP (ref 0.5–1.3)
ESTIMATED AVERAGE GLUCOSE: 114 MG/DL — SIGNIFICANT CHANGE UP (ref 68–114)
GLUCOSE SERPL-MCNC: 130 MG/DL — HIGH (ref 70–99)
HBA1C BLD-MCNC: 5.6 % — SIGNIFICANT CHANGE UP (ref 4–5.6)
HBA1C BLD-MCNC: 5.6 % — SIGNIFICANT CHANGE UP (ref 4–5.6)
HCT VFR BLD CALC: 37.8 % — LOW (ref 39–50)
HCT VFR BLD CALC: 39.5 % — SIGNIFICANT CHANGE UP (ref 39–50)
HDLC SERPL-MCNC: 34 MG/DL — LOW
HGB BLD-MCNC: 12.5 G/DL — LOW (ref 13–17)
HGB BLD-MCNC: 13.1 G/DL — SIGNIFICANT CHANGE UP (ref 13–17)
INR BLD: 1.22 RATIO — HIGH (ref 0.88–1.16)
LIPID PNL WITH DIRECT LDL SERPL: 114 MG/DL — SIGNIFICANT CHANGE UP
MCHC RBC-ENTMCNC: 28 PG — SIGNIFICANT CHANGE UP (ref 27–34)
MCHC RBC-ENTMCNC: 28.2 PG — SIGNIFICANT CHANGE UP (ref 27–34)
MCHC RBC-ENTMCNC: 33 GM/DL — SIGNIFICANT CHANGE UP (ref 32–36)
MCHC RBC-ENTMCNC: 33.2 GM/DL — SIGNIFICANT CHANGE UP (ref 32–36)
MCV RBC AUTO: 85 FL — SIGNIFICANT CHANGE UP (ref 80–100)
MCV RBC AUTO: 85 FL — SIGNIFICANT CHANGE UP (ref 80–100)
PLATELET # BLD AUTO: 147 K/UL — LOW (ref 150–400)
PLATELET # BLD AUTO: 158 K/UL — SIGNIFICANT CHANGE UP (ref 150–400)
POTASSIUM SERPL-MCNC: 4.1 MMOL/L — SIGNIFICANT CHANGE UP (ref 3.5–5.3)
POTASSIUM SERPL-SCNC: 4.1 MMOL/L — SIGNIFICANT CHANGE UP (ref 3.5–5.3)
PROT SERPL-MCNC: 7.6 G/DL — SIGNIFICANT CHANGE UP (ref 6–8.3)
PROTHROM AB SERPL-ACNC: 13.2 SEC — HIGH (ref 9.8–12.7)
RBC # BLD: 4.45 M/UL — SIGNIFICANT CHANGE UP (ref 4.2–5.8)
RBC # BLD: 4.64 M/UL — SIGNIFICANT CHANGE UP (ref 4.2–5.8)
RBC # FLD: 13.5 % — SIGNIFICANT CHANGE UP (ref 10.3–14.5)
RBC # FLD: 13.5 % — SIGNIFICANT CHANGE UP (ref 10.3–14.5)
SODIUM SERPL-SCNC: 139 MMOL/L — SIGNIFICANT CHANGE UP (ref 135–145)
TOTAL CHOLESTEROL/HDL RATIO MEASUREMENT: 4.6 RATIO — SIGNIFICANT CHANGE UP (ref 3.4–9.6)
TRIGL SERPL-MCNC: 51 MG/DL — SIGNIFICANT CHANGE UP (ref 10–149)
TROPONIN T, HIGH SENSITIVITY RESULT: 13 NG/L — SIGNIFICANT CHANGE UP (ref 0–51)
WBC # BLD: 6 K/UL — SIGNIFICANT CHANGE UP (ref 3.8–10.5)
WBC # BLD: 6.3 K/UL — SIGNIFICANT CHANGE UP (ref 3.8–10.5)
WBC # FLD AUTO: 6 K/UL — SIGNIFICANT CHANGE UP (ref 3.8–10.5)
WBC # FLD AUTO: 6.3 K/UL — SIGNIFICANT CHANGE UP (ref 3.8–10.5)

## 2018-09-04 PROCEDURE — 70553 MRI BRAIN STEM W/O & W/DYE: CPT | Mod: 26

## 2018-09-04 PROCEDURE — 93306 TTE W/DOPPLER COMPLETE: CPT | Mod: 26

## 2018-09-04 PROCEDURE — 70548 MR ANGIOGRAPHY NECK W/DYE: CPT | Mod: 26

## 2018-09-04 PROCEDURE — 70450 CT HEAD/BRAIN W/O DYE: CPT | Mod: 26

## 2018-09-04 PROCEDURE — 99291 CRITICAL CARE FIRST HOUR: CPT

## 2018-09-04 RX ORDER — HEPARIN SODIUM 5000 [USP'U]/ML
1000 INJECTION INTRAVENOUS; SUBCUTANEOUS
Qty: 25000 | Refills: 0 | Status: DISCONTINUED | OUTPATIENT
Start: 2018-09-04 | End: 2018-09-04

## 2018-09-04 RX ORDER — ATORVASTATIN CALCIUM 80 MG/1
80 TABLET, FILM COATED ORAL AT BEDTIME
Qty: 0 | Refills: 0 | Status: DISCONTINUED | OUTPATIENT
Start: 2018-09-04 | End: 2018-09-10

## 2018-09-04 RX ORDER — HEPARIN SODIUM 5000 [USP'U]/ML
1100 INJECTION INTRAVENOUS; SUBCUTANEOUS
Qty: 25000 | Refills: 0 | Status: DISCONTINUED | OUTPATIENT
Start: 2018-09-04 | End: 2018-09-04

## 2018-09-04 RX ORDER — HEPARIN SODIUM 5000 [USP'U]/ML
900 INJECTION INTRAVENOUS; SUBCUTANEOUS
Qty: 25000 | Refills: 0 | Status: DISCONTINUED | OUTPATIENT
Start: 2018-09-04 | End: 2018-09-05

## 2018-09-04 RX ORDER — HEPARIN SODIUM 5000 [USP'U]/ML
900 INJECTION INTRAVENOUS; SUBCUTANEOUS
Qty: 25000 | Refills: 0 | Status: DISCONTINUED | OUTPATIENT
Start: 2018-09-04 | End: 2018-09-04

## 2018-09-04 RX ADMIN — SODIUM CHLORIDE 75 MILLILITER(S): 9 INJECTION INTRAMUSCULAR; INTRAVENOUS; SUBCUTANEOUS at 00:19

## 2018-09-04 RX ADMIN — HEPARIN SODIUM 9 UNIT(S)/HR: 5000 INJECTION INTRAVENOUS; SUBCUTANEOUS at 21:01

## 2018-09-04 RX ADMIN — SODIUM CHLORIDE 75 MILLILITER(S): 9 INJECTION INTRAMUSCULAR; INTRAVENOUS; SUBCUTANEOUS at 07:00

## 2018-09-04 RX ADMIN — ATORVASTATIN CALCIUM 80 MILLIGRAM(S): 80 TABLET, FILM COATED ORAL at 21:01

## 2018-09-04 RX ADMIN — HEPARIN SODIUM 9 UNIT(S)/HR: 5000 INJECTION INTRAVENOUS; SUBCUTANEOUS at 18:27

## 2018-09-04 RX ADMIN — HEPARIN SODIUM 11 UNIT(S)/HR: 5000 INJECTION INTRAVENOUS; SUBCUTANEOUS at 05:41

## 2018-09-04 RX ADMIN — HEPARIN SODIUM 10 UNIT(S)/HR: 5000 INJECTION INTRAVENOUS; SUBCUTANEOUS at 11:02

## 2018-09-04 NOTE — PATIENT PROFILE ADULT. - AS [facility] [employer]
8/1/17 - Attempted phone contact with pt with no answer. Voice mail left requesting return call and letter will be mailed also requesting return call. Sophia Real RN  
(3) slightly limited

## 2018-09-05 DIAGNOSIS — I63.9 CEREBRAL INFARCTION, UNSPECIFIED: ICD-10-CM

## 2018-09-05 DIAGNOSIS — I65.1 OCCLUSION AND STENOSIS OF BASILAR ARTERY: ICD-10-CM

## 2018-09-05 LAB
APTT BLD: 129.6 SEC — CRITICAL HIGH (ref 27.5–37.4)
APTT BLD: 153.1 SEC — CRITICAL HIGH (ref 27.5–37.4)
APTT BLD: 52.6 SEC — HIGH (ref 27.5–37.4)
APTT BLD: 77.6 SEC — HIGH (ref 27.5–37.4)
HCT VFR BLD CALC: 41.1 % — SIGNIFICANT CHANGE UP (ref 39–50)
HCT VFR BLD CALC: 43.9 % — SIGNIFICANT CHANGE UP (ref 39–50)
HGB BLD-MCNC: 14 G/DL — SIGNIFICANT CHANGE UP (ref 13–17)
HGB BLD-MCNC: 14.5 G/DL — SIGNIFICANT CHANGE UP (ref 13–17)
MCHC RBC-ENTMCNC: 28 PG — SIGNIFICANT CHANGE UP (ref 27–34)
MCHC RBC-ENTMCNC: 29 PG — SIGNIFICANT CHANGE UP (ref 27–34)
MCHC RBC-ENTMCNC: 33 GM/DL — SIGNIFICANT CHANGE UP (ref 32–36)
MCHC RBC-ENTMCNC: 34.1 GM/DL — SIGNIFICANT CHANGE UP (ref 32–36)
MCV RBC AUTO: 84.9 FL — SIGNIFICANT CHANGE UP (ref 80–100)
MCV RBC AUTO: 85.2 FL — SIGNIFICANT CHANGE UP (ref 80–100)
PLATELET # BLD AUTO: 154 K/UL — SIGNIFICANT CHANGE UP (ref 150–400)
PLATELET # BLD AUTO: 155 K/UL — SIGNIFICANT CHANGE UP (ref 150–400)
RBC # BLD: 4.82 M/UL — SIGNIFICANT CHANGE UP (ref 4.2–5.8)
RBC # BLD: 5.17 M/UL — SIGNIFICANT CHANGE UP (ref 4.2–5.8)
RBC # FLD: 13.5 % — SIGNIFICANT CHANGE UP (ref 10.3–14.5)
RBC # FLD: 13.7 % — SIGNIFICANT CHANGE UP (ref 10.3–14.5)
WBC # BLD: 6.2 K/UL — SIGNIFICANT CHANGE UP (ref 3.8–10.5)
WBC # BLD: 6.9 K/UL — SIGNIFICANT CHANGE UP (ref 3.8–10.5)
WBC # FLD AUTO: 6.2 K/UL — SIGNIFICANT CHANGE UP (ref 3.8–10.5)
WBC # FLD AUTO: 6.9 K/UL — SIGNIFICANT CHANGE UP (ref 3.8–10.5)

## 2018-09-05 PROCEDURE — 99233 SBSQ HOSP IP/OBS HIGH 50: CPT

## 2018-09-05 RX ORDER — HYDRALAZINE HCL 50 MG
5 TABLET ORAL ONCE
Qty: 0 | Refills: 0 | Status: COMPLETED | OUTPATIENT
Start: 2018-09-05 | End: 2018-09-05

## 2018-09-05 RX ORDER — HEPARIN SODIUM 5000 [USP'U]/ML
1000 INJECTION INTRAVENOUS; SUBCUTANEOUS
Qty: 25000 | Refills: 0 | Status: DISCONTINUED | OUTPATIENT
Start: 2018-09-05 | End: 2018-09-06

## 2018-09-05 RX ORDER — HEPARIN SODIUM 5000 [USP'U]/ML
1100 INJECTION INTRAVENOUS; SUBCUTANEOUS
Qty: 25000 | Refills: 0 | Status: DISCONTINUED | OUTPATIENT
Start: 2018-09-05 | End: 2018-09-05

## 2018-09-05 RX ADMIN — HEPARIN SODIUM 12 UNIT(S)/HR: 5000 INJECTION INTRAVENOUS; SUBCUTANEOUS at 06:52

## 2018-09-05 RX ADMIN — Medication 5 MILLIGRAM(S): at 04:34

## 2018-09-05 RX ADMIN — ATORVASTATIN CALCIUM 80 MILLIGRAM(S): 80 TABLET, FILM COATED ORAL at 22:12

## 2018-09-05 RX ADMIN — HEPARIN SODIUM 11 UNIT(S)/HR: 5000 INJECTION INTRAVENOUS; SUBCUTANEOUS at 02:01

## 2018-09-05 NOTE — PROGRESS NOTE ADULT - ASSESSMENT
54yo black man with a PMHx of HTN presents to [facility] as a transfer from [facility] for basilar/vert occlusion. Patient presenting acutely and severely dysarthric with right sided weakness, left forced gaze. Patient accompanied by daughter who reports patient is fully functional at baseline. In the ED, CTH NAD. CTA shows distal flow past proximal basilar/vertebral occlusion. Patient's daughter reports no fevers, chill, ns, cp, sob, abd pain, n/v/d/c, or changes in senses of patient. NIHSS 8 MRS 0.     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 symptomatic intracranial atherosclerosis with superimposed local thrombosis (vertebral/basilar thrombosis) and artery to artery embolism and is likely to be secondary to embolism from a proximal source like cardiac/paradoxical source of embolism; which needs to be screened for    NEURO: Continue close monitoring for neurologic deterioration, permissive HTN, titrate statin to LDL goal less than 70, MRI Brain w/o, MRA Head w/o and Neck w/contrast noted above. Physical therapy/OT eval pending.     ANTITHROMBOTIC THERAPY: not indicated    PULMONARY: CXR (09/03)- mild pulmonary vascular congestion, protecting airway, saturating well     CARDIOVASCULAR: TTE: EF 75%, mild MR, minimal AR, mild diastolic dysfunction (Stage I) , cardiac monitoring                              SBP goal: Permissive HTN followed by gradual normotension    GASTROINTESTINAL:  dysphagia screen- passed, tolerating diet       Diet: Pureed    RENAL: BUN/Cr without acute change, good urine output      Na Goal: Greater than 135     Moody: N    HEMATOLOGY: H/H without acute change, Platelets 154     DVT ppx: Heparin drip    ID: afebrile, no leukocytosis     OTHER: Daughter at bedside, all questions and concerns addressed.    DISPOSITION: Rehab or home depending on PT eval once stable and workup is complete      CORE MEASURES:        Admission NIHSS: 8     TPA: [] YES [X] NO      LDL/HDL: P     Depression Screen:      Statin Therapy: Y     Dysphagia Screen: [X] PASS [] FAIL     Smoking [] YES [X] NO      Afib [] YES [X] NO     Stroke Education [] YES [] NO- p 83 years old man with multiple vascular risk factors including age and HTN is evaluated at [facility] for acute onset of dysarthria. On 9/3, he presented to [facility] for acute onset of dysarthria and was subsequently transferred to [facility] for further management of "CTA findings". CT brain on 9/3 did not show any evidence of acute infarct or hemorrhage. 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 MRI brain on 9/4 showed bilateral PICA distribution and left PCA distribution punctate infarcts and severe leukoaraiosis.     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 symptomatic intracranial atherosclerosis with superimposed local thrombosis (vertebral/basilar thrombosis) and artery to artery embolism and is likely to be secondary to embolism from a proximal source like cardiac/paradoxical source of embolism; which needs to be screened for    NEURO: Continue close monitoring for neurologic deterioration, permissive HTN, atorvastatin 80 mg at bedtime considering likely atheroembolic etiology of his stroke, MRI Brain w/o, MRA Head w/o and Neck w/contrast noted above. Physical therapy/OT eval pending.     ANTITHROMBOTIC THERAPY: Continue with cautious therapeutic anticoagulation with heparin drip with goal PTT 50-70 as benefits of therapeutic anticoagulation in the setting of basilar thrombosis would outweigh potential risks including hemorrhagic transformation of ischemic stroke. Risks versus benefits and adverse reaction/complication associated with therapeutic anticoagulation in this patient with acute stroke were discussed with patient and available family members in detail. Duration of therapeutic anticoagulation is expected to be 3 months based on clinical and radiological stability/improvement. No indications to add antiplatelet therapy to therapeutic anticoagulation from secondary stroke prevention stand point unless he has a history of CAD/cardiac stents     PULMONARY: CXR (09/03)- mild pulmonary vascular congestion, protecting airway, saturating well     CARDIOVASCULAR: TTE: EF 75%, mild MR, minimal AR, mild diastolic dysfunction (Stage I) , cardiac monitoring                              SBP goal: Permissive HTN followed by gradual normotension    GASTROINTESTINAL:  dysphagia screen- passed, tolerating diet       Diet: Pureed    RENAL: BUN/Cr without acute change, good urine output      Na Goal: Greater than 135     Moody: N    HEMATOLOGY: H/H without acute change, Platelets 154     DVT ppx: Heparin drip    ID: afebrile, no leukocytosis     OTHER: Daughter at bedside, all questions and concerns addressed.    DISPOSITION: Rehab or home depending on PT eval once stable and workup is complete      CORE MEASURES:        Admission NIHSS: 8     TPA: [] YES [X] NO      LDL/HDL: P     Depression Screen:      Statin Therapy: Y     Dysphagia Screen: [X] PASS [] FAIL     Smoking [] YES [X] NO      Afib [] YES [X] NO     Stroke Education [] YES [] NO- p

## 2018-09-05 NOTE — OCCUPATIONAL THERAPY INITIAL EVALUATION ADULT - LIVES WITH, PROFILE
lives with daughter in private house +3 stairs to enter +1 flight to bed/bath, independent with ADLs and ambulation

## 2018-09-05 NOTE — PHYSICAL THERAPY INITIAL EVALUATION ADULT - STRENGTHENING, PT EVAL
GOAL: Patient will improve bilateral UE/LE strength to 5/5, for increased limb stability, to improve gait and facilitate stair negotiation in 4 weeks.

## 2018-09-05 NOTE — PHYSICAL THERAPY INITIAL EVALUATION ADULT - GENERAL OBSERVATIONS, REHAB EVAL
Pt received sitting in recliner with +cardiac monitor, +BP cuff, +pulse ox, +heparin drip, and +ICU monitoring. NAD noted.

## 2018-09-05 NOTE — PROGRESS NOTE ADULT - SUBJECTIVE AND OBJECTIVE BOX
THE PATIENT WAS SEEN AND EXAMINED BY ME WITH THE HOUSESTAFF AND STROKE TEAM DURING MORNING ROUNDS.     HPI: 52yo black man with a PMHx of HTN presents to NS as a transfer from [facility] for basilar/vert occlusion. Patient presenting acutely and severely dysarthric with right sided weakness, left forced gaze. Patient accompanied by daughter who reports patient is fully functional at baseline. In the ED, CTH NAD. CTA shows distal flow past proximal basilar/vertebral occlusion. Patient's daughter reports no fevers, chill, ns, cp, sob, abd pain, n/v/d/c, or changes in senses of patient. NIHSS 8 MRS 0.     SUBJECTIVE: No events overnight.  No new neurologic complaints.      atorvastatin 80 milliGRAM(s) Oral at bedtime  heparin  Infusion 1000 Unit(s)/Hr IV Continuous <Continuous>      PHYSICAL EXAM:   Vital Signs Last 24 Hrs  T(C): 36.7 (05 Sep 2018 11:00), Max: 37.2 (05 Sep 2018 03:00)  T(F): 98 (05 Sep 2018 11:00), Max: 98.9 (05 Sep 2018 03:00)  HR: 65 (05 Sep 2018 13:00) (57 - 80)  BP: 171/93 (05 Sep 2018 13:00) (129/88 - 185/88)  BP(mean): 123 (05 Sep 2018 13:00) (95 - 126)  RR: 15 (05 Sep 2018 13:00) (10 - 28)  SpO2: 98% (05 Sep 2018 13:00) (95% - 100%)    General: No acute distress  HEENT: EOM intact, visual fields full  Abdomen: Soft, nontender, nondistended   Extremities: No edema    NEUROLOGICAL EXAM:  Mental status: Awake, alert, confused- states he is 84 and month is April, no aphasia, no neglect, normal memory   Cranial Nerves: No facial asymmetry, no nystagmus, mild dysarthria,  tongue midline  Motor exam: Bilateral ataxia L >R,  5-/5 RUE, 5/5 RLE, 5/5 LUE, 5/5 LLE, normal fine finger movements.  Sensation: Intact to light touch   Coordination/ Gait: Dysmteria on right hand, gait not assessed    LABS:                        14.5   6.2   )-----------( 154      ( 05 Sep 2018 08:16 )             43.9    09-04    139  |  103  |  10  ----------------------------<  130<H>  4.1   |  24  |  0.91    Ca    8.7      04 Sep 2018 00:41    TPro  7.6  /  Alb  3.9  /  TBili  0.6  /  DBili  x   /  AST  14  /  ALT  5<L>  /  AlkPhos  52  09-04  PT/INR - ( 04 Sep 2018 00:41 )   PT: 13.2 sec;   INR: 1.22 ratio         PTT - ( 05 Sep 2018 08:16 )  PTT:153.1 sec  Hemoglobin A1C, Whole Blood: 5.6 % (09-04 @ 07:10)  Hemoglobin A1C, Whole Blood: 5.6 % (09-04 @ 07:10)      IMAGING: Reviewed by me.     (09.04.18):  MRI BRAIN: Acute bilateral inferior cerebellar hemisphere infarcts. No MR   evidence for hemorrhagic transformation.    MRA HEAD: Nondiagnostic study due to patient motion.    MRA NECK: Limited by motion. Grossly preserved flow-related signal within   the bilateral common and internal carotid arteries, and the bilateral   vertebral arteries.    CT HEAD:   Evolving bilateral cerebellar hemisphere infarcts, without hemorrhagic   transformation. THE PATIENT WAS SEEN AND EXAMINED BY ME WITH THE HOUSESTAFF AND STROKE TEAM DURING MORNING ROUNDS.     HPI: 82yo black man with a PMHx of HTN presents to NS as a transfer from [facility] for basilar/vert occlusion. Patient presenting acutely and severely dysarthric with right sided weakness, left forced gaze. Patient accompanied by daughter who reports patient is fully functional at baseline. In the ED, CTH NAD. CTA shows distal flow past proximal basilar/vertebral occlusion. Patient's daughter reports no fevers, chill, ns, cp, sob, abd pain, n/v/d/c, or changes in senses of patient. NIHSS 8 MRS 0.     SUBJECTIVE: No events overnight.  No new neurologic complaints.      atorvastatin 80 milliGRAM(s) Oral at bedtime  heparin  Infusion 1000 Unit(s)/Hr IV Continuous <Continuous>      PHYSICAL EXAM:   Vital Signs Last 24 Hrs  T(C): 36.7 (05 Sep 2018 11:00), Max: 37.2 (05 Sep 2018 03:00)  T(F): 98 (05 Sep 2018 11:00), Max: 98.9 (05 Sep 2018 03:00)  HR: 65 (05 Sep 2018 13:00) (57 - 80)  BP: 171/93 (05 Sep 2018 13:00) (129/88 - 185/88)  BP(mean): 123 (05 Sep 2018 13:00) (95 - 126)  RR: 15 (05 Sep 2018 13:00) (10 - 28)  SpO2: 98% (05 Sep 2018 13:00) (95% - 100%)    General: No acute distress  HEENT: EOM intact, visual fields full  Abdomen: Soft, nontender, nondistended   Extremities: No edema    NEUROLOGICAL EXAM:  Mental status: Awake, alert, confused- states he is 84 and month is April, no aphasia, no neglect, normal memory   Cranial Nerves: No facial asymmetry, no nystagmus, mild dysarthria,  tongue midline  Motor exam: Bilateral ataxia L >R,  5-/5 RUE, 5/5 RLE, 5/5 LUE, 5/5 LLE, normal fine finger movements.  Sensation: Intact to light touch   Coordination/ Gait: Dysmteria on right hand, gait not assessed    LABS:                        14.5   6.2   )-----------( 154      ( 05 Sep 2018 08:16 )             43.9    09-04    139  |  103  |  10  ----------------------------<  130<H>  4.1   |  24  |  0.91    Ca    8.7      04 Sep 2018 00:41    TPro  7.6  /  Alb  3.9  /  TBili  0.6  /  DBili  x   /  AST  14  /  ALT  5<L>  /  AlkPhos  52  09-04  PT/INR - ( 04 Sep 2018 00:41 )   PT: 13.2 sec;   INR: 1.22 ratio         PTT - ( 05 Sep 2018 08:16 )  PTT:153.1 sec  Hemoglobin A1C, Whole Blood: 5.6 % (09-04 @ 07:10)  Hemoglobin A1C, Whole Blood: 5.6 % (09-04 @ 07:10)      IMAGING: Reviewed by me.     (09.04.18):  MRI BRAIN: Acute bilateral inferior cerebellar hemisphere infarcts. No MR   evidence for hemorrhagic transformation.    MRA HEAD: Nondiagnostic study due to patient motion.    MRA NECK: Limited by motion. Grossly preserved flow-related signal within   the bilateral common and internal carotid arteries, and the bilateral   vertebral arteries.    CT HEAD:   Evolving bilateral cerebellar hemisphere infarcts, without hemorrhagic   transformation.

## 2018-09-05 NOTE — OCCUPATIONAL THERAPY INITIAL EVALUATION ADULT - ADDITIONAL COMMENTS
CT head: Acute left frontal intraparenchymal hematoma measuring 3.6 x 3.2 x 2.5 cm   resulting in 7 mm of rightward midline shift. Left holohemispheric acute subdural hematoma measuring 6 mm in maximal thickness. Bilateral acute subarachnoid hemorrhage. Nondisplaced fracture of the right occipital bone extending inferiorly to involve the base of the skull near the foramen magnum. The fracture line reaches to but does not involve the right occipital condyle. cT Angio Neck : CT angiography brain: Occlusion of the right vertebral V4 segment and distal left vertebral V4 segment after the PICA takeoff. Occlusion of the proximal basilar artery at the vertebrobasilar junction with trickle flow related enhancement in the mid and distal basilar artery likely related to retrograde flow. Mild luminal irregularity of the proximal PCAs likely related to atherosclerotic disease.Mild stenosis of the proximal left M1 segment of the middle cerebral artery in comparison to the right. Focal short segment occlusion of a left M2 MCA segment in the sylvian cistern. Xray Chest:  Nonspecific small radiopacity over the right middle lung.

## 2018-09-05 NOTE — CONSULT NOTE ADULT - SUBJECTIVE AND OBJECTIVE BOX
CHIEF COMPLAINT:  CVA     HISTORY OF PRESENT ILLNESS: 54 yo male with admitted to SICU as a transfer from [facility] for basilar/vert occlusion. Patient presenting acutely and severely dysarthric with right sided weakness, left forced gaze. Patient accompanied by daughter who reports patient is fully functional at baseline.   In the ED, CTH NAD. CTA shows distal flow past proximal basilar/vertebral occlusion. Patient's daughter reports no fevers, chill, ns, cp, sob, abd pain, n/v/d/c, or changes in senses of patient.   NIHSS 8 MRS 0.   States to have frequent palpitations. Also has exertional shortness of breath and chest pain       PAST MEDICAL & SURGICAL HISTORY:  HTN (hypertension)  No significant past surgical history          MEDICATIONS:  heparin  Infusion 1000 Unit(s)/Hr IV Continuous <Continuous>            atorvastatin 80 milliGRAM(s) Oral at bedtime        FAMILY HISTORY:  No pertinent family history in first degree relatives      SOCIAL HISTORY:    [ ] Non-smoker  [ ] Smoker  [ ] Alcohol    Allergies    No Known Allergies    Intolerances    	    REVIEW OF SYSTEMS:  CONSTITUTIONAL: No fever, weight loss, + fatigue  EYES: No eye pain, visual disturbances, or discharge  ENMT:  No difficulty hearing, tinnitus, vertigo; No sinus or throat pain  NECK: No pain or stiffness  RESPIRATORY: No cough, wheezing, chills or hemoptysis; No Shortness of Breath  CARDIOVASCULAR: + chest pain, palpitations, passing out, dizziness, or leg swelling  GASTROINTESTINAL: No abdominal or epigastric pain. No nausea, vomiting, or hematemesis; No diarrhea or constipation. No melena or hematochezia.  GENITOURINARY: No dysuria, frequency, hematuria, or incontinence  NEUROLOGICAL: + headaches, memory loss, loss of strength, numbness, or tremors  SKIN: No itching, burning, rashes, or lesions   LYMPH Nodes: No enlarged glands  ENDOCRINE: No heat or cold intolerance; No hair loss  MUSCULOSKELETAL: + joint pain or swelling; No muscle, back, or extremity pain  PSYCHIATRIC: No depression, anxiety, mood swings, or difficulty sleeping  HEME/LYMPH: No easy bruising, or bleeding gums  ALLERY AND IMMUNOLOGIC: No hives or eczema	    [ ] All others negative	  [ ] Unable to obtain    PHYSICAL EXAM:  T(C): 36.6 (09-05-18 @ 19:00), Max: 37.2 (09-05-18 @ 03:00)  HR: 68 (09-05-18 @ 20:00) (57 - 80)  BP: 154/90 (09-05-18 @ 20:00) (129/88 - 185/88)  RR: 24 (09-05-18 @ 20:00) (10 - 37)  SpO2: 98% (09-05-18 @ 20:00) (95% - 100%)  Wt(kg): --  I&O's Summary    04 Sep 2018 07:01  -  05 Sep 2018 07:00  --------------------------------------------------------  IN: 1083 mL / OUT: 1400 mL / NET: -317 mL    05 Sep 2018 07:01  -  05 Sep 2018 20:54  --------------------------------------------------------  IN: 106 mL / OUT: 200 mL / NET: -94 mL        Appearance: NAD  HEENT:   Normal oral mucosa, PERRL, EOMI	  Lymphatic: No lymphadenopathy  Cardiovascular: Normal S1 S2, No JVD, No murmurs, No edema  Respiratory: +crackles   Psychiatry: A & O x 3, Mood & affect appropriate  Gastrointestinal:  Soft, Non-tender, + BS	  Skin: No rashes, No ecchymoses, No cyanosis	  Neurologic:+ weakness   Extremities: Normal range of motion, No clubbing, cyanosis or edema  Vascular: Peripheral pulses palpable 2+ bilaterally    TELEMETRY: 	SR   ECG:  	NSR, no acute ischemic stt changes   RADIOLOGY:     < from: Xray Chest 1 View AP/PA (09.03.18 @ 22:51) >    EXAM:  XR CHEST AP OR PA 1V                            PROCEDURE DATE:  09/03/2018            INTERPRETATION:  CLINICAL INFORMATION: Confusion, evaluate for CHF.    TECHNIQUE: AP view of the chest.    COMPARISON: None available    FINDINGS:     Theheart size cannot be adequately assessed on this single view. There   is mild pulmonary vascular congestion. No focal consolidation or pleural   effusion is seen.  Degenerative changes of the thoracic spine.    IMPRESSION:   Mild pulmonary vascular congestion.                MENDY FOX M.D., RADIOLOGY RESIDENT  This document has been electronically signed.  JULIET LEA M.D., ATTENDING RADIOLOGIST  This document has been electronically signed. Sep  3 2018 11:29PM                < end of copied text >       < from: CT Head No Cont (09.04.18 @ 22:20) >    EXAM:  CT BRAIN                            PROCEDURE DATE:  09/04/2018            INTERPRETATION:  HISTORY: Acute bilateral cerebellar hemisphere infarcts   on heparin drip. CT head follow-up. Dizziness.    COMPARISON: CT head 9/3/2018 and MRI brain earlier same day.    TECHNIQUE: Axial noncontrast CT images from the skull base to the vertex   were obtained and submitted for interpretation. Coronal and sagittal   reformatted images were performed. Bone and soft tissue windows were   evaluated.    FINDINGS:    There are hypodensities in the bilateral cerebral hemispheres, consistent   with evolving acute infarcts as compared to MRI brain earlier the same   day. There is no hemorrhagic transformation of the infarcts. There is no   secondary mass effect on the fourth ventricle, and no associated   hydrocephalus. There is no acute hemorrhage, midline shift, or abnormal   extra-axial fluid collection.     Patchy areas of hypodensity in the periventricular and subcortical white   matter consistent with microvascular ischemic changes. There is a   punctate calcified focus in the right basal ganglia.    There is mild age-appropriate cerebral volume loss with proportional   sulcal and ventricular prominence. No hydrocephalus. Basal cisterns are   patent.     Paranasal sinuses and mastoid air cells are clear. Calvarium is intact.     IMPRESSION:     Evolving bilateral cerebellar hemisphere infarcts, without hemorrhagic   transformation.                ANIA RAY M.D., RADIOLGY RESIDENT  This document has been electronically signed.  ESTELLE BRYANT M.D., ATTENDING RADIOLOGIST  This document has been electronically signed. Sep  5 2018  9:28AM              < from: MR Head w/wo IV Cont (09.04.18 @ 21:27) >    EXAM:  MR BRAIN WAW IC                          EXAM:  MR ANGIO NECK IC                          EXAM:  MR ANGIO BRAIN                            PROCEDURE DATE:  09/04/2018            INTERPRETATION:  MR ANGIO BRAIN, MR ANGIO NECK IC, MR BRAIN WAWIC    INDICATION: TIA. Evaluate for stenosis.    TECHNIQUE: Multisequence, multiplanar imaging of the brain without   contrast. Time-of-flight MR angiography performed of the [place]   and the neck. Contrast enhanced MR angiography performedof the neck.     10 mL of Gadavist gadolinium contrast was injected IV.    COMPARISON: CTA head and neck dated 9/3/2018..    FINDINGS:    MRI brain:    Restricted diffusion within the bilateral inferior cerebellar hemispheres   involving the PICA territories is compatible with acute infarcts. No MR   evidence for hemorrhagic transformation.    Scattered T2/FLAIR hyperintensity in the subcortical and periventricular   white matter likely reflects moderate chronic microvascular disease.     No acute intracranial hemorrhage.  No midline shift.  No evidence of   hydrocephalus. Abnormal signal within the bilateral vertebral and basilar   arteries is consistent with previously seen poor flow related enhancement   of these vessels.    Clear paranasal sinuses and mastoid air cells. The orbits, sellar and   suprasellar structures, and craniocervical junction are unremarkable.    MRA Head:  Nondiagnostic secondary to motion artifact.    MRA Neck:  Limited secondary to motion artifact.    Grossly preserved flow-related signal within the bilateral common and   internal carotid arteries, and the bilateral vertebral arteries. The   right vertebral artery appears hypoplastic.    IMPRESSION:    MRI brain: Acute bilateral inferior cerebellar hemisphere infarcts. No MR   evidence for hemorrhagic transformation.    MRA head: Nondiagnostic study due to patient motion.    MRA neck: Limited by motion. Grossly preserved flow-related signal within   the bilateral common and internal carotid arteries, andthe bilateral   vertebral arteries.    Dr. Abraham discussed these findings with Dr. Sandip Mckeon on 9/4/2018 5:05   PM with read back.                HALLE JUAREZ M.D., RADIOLOGY RESIDENT  This document has been electronically signed.  CALEB ABRAHAM M.D., ATTENDING RADIOLOGIST  This document has been electronically signed. Sep  4 2018  9:27PM                < end of copied text >    OTHER: 	  	  LABS:	 	    CARDIAC MARKERS:                                  14.5   6.2   )-----------( 154      ( 05 Sep 2018 08:16 )             43.9     09-04    139  |  103  |  10  ----------------------------<  130<H>  4.1   |  24  |  0.91    Ca    8.7      04 Sep 2018 00:41    TPro  7.6  /  Alb  3.9  /  TBili  0.6  /  DBili  x   /  AST  14  /  ALT  5<L>  /  AlkPhos  52  09-04    proBNP:   Lipid Profile:   HgA1c:   TSH:

## 2018-09-05 NOTE — PHYSICAL THERAPY INITIAL EVALUATION ADULT - IMPAIRMENTS CONTRIBUTING TO GAIT DEVIATIONS, PT EVAL
impaired balance/impaired coordination/decreased flexibility/impaired motor control/narrow base of support/impaired postural control/decreased strength

## 2018-09-05 NOTE — PROVIDER CONTACT NOTE (CRITICAL VALUE NOTIFICATION) - BACKGROUND
No
Pt s/p cerebral infarct, receiving anticoagulation
pt s/p cerebral infarct, receiving heparin gtt

## 2018-09-05 NOTE — CONSULT NOTE ADULT - PROBLEM SELECTOR RECOMMENDATION 9
Monitor on Tele   Started on Heparin gtt   Check TTE   As per neurology recommendations, will plan for ILR if workup is negative

## 2018-09-05 NOTE — PHYSICAL THERAPY INITIAL EVALUATION ADULT - IMPAIRMENTS CONTRIBUTING IMPAIRED BED MOBILITY, REHAB EVAL
impaired coordination/narrow base of support/decreased strength/decreased flexibility/impaired motor control/impaired balance

## 2018-09-05 NOTE — PHYSICAL THERAPY INITIAL EVALUATION ADULT - PRECAUTIONS/LIMITATIONS, REHAB EVAL
fall precautions/+Trans Echo 9/4/18: Mild mitral annular calcification, otherwise normal mitral valve. Mild mitral regurgitation. Mildly calcified trileaflet aortic valve with normal opening. Minimal aortic regurgitation. Mild concentric left ventricular hypertrophy. Hyperdynamic left ventricular systolic function. Mild diastolic dysfunction (Stage I). +CXR 9/3/18: Mild pulmonary vascular congestion. +MRI brain 9/4/18: Acute bilateral inferior cerebellar hemisphere infarcts. (-) MRA neck: Grossly preserved flow-related signal within the bilateral common and internal carotid arteries, and the bilateral vertebral arteries. Head CT 9/4/18: Evolving bilateral cerebellar hemisphere infarcts, without hemorrhagic transformation. (-) Brain Stroke Protocol CT 9/3/18: No acute intracranial hemorrhage, brain edema, or mass effect.
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
## 2018-09-05 NOTE — CONSULT NOTE ADULT - ASSESSMENT
54 yo male admitted as  transfer from [facility] for basilar/vert occlusion. Had acutely and severely dysarthric with right sided weakness, left forced gaze. In the ED, CTH NAD. CTA shows distal flow past proximal basilar/vertebral occlusion

## 2018-09-05 NOTE — OCCUPATIONAL THERAPY INITIAL EVALUATION ADULT - DIAGNOSIS, OT EVAL
Pt demonstrated decreased strength, balance, cognition, fine motor coordination, motor control impacting pt's ability to participate in functional mobility and ADLs.

## 2018-09-05 NOTE — PHYSICAL THERAPY INITIAL EVALUATION ADULT - IMPAIRED TRANSFERS: SIT/STAND, REHAB EVAL
impaired balance/decreased flexibility/impaired motor control/decreased strength/narrow base of support/impaired postural control

## 2018-09-05 NOTE — PHYSICAL THERAPY INITIAL EVALUATION ADULT - PERTINENT HX OF CURRENT PROBLEM, REHAB EVAL
Pt is a 83 y.o. male with a PMHx of HTN presents to NS as a transfer from [facility] for basilar/vert occlusion. Patient presenting acutely and severely dysarthric with right sided weakness, left forced gaze. In the ED, CTH NAD. CTA shows distal flow past proximal basilar/vertebral occlusion. Continued below.

## 2018-09-05 NOTE — PROVIDER CONTACT NOTE (CRITICAL VALUE NOTIFICATION) - ASSESSMENT
Pt neurological exam consistent with baseline. No signs or symptoms of bleeding. will continue to montior
pt alert, but confused

## 2018-09-05 NOTE — OCCUPATIONAL THERAPY INITIAL EVALUATION ADULT - PLANNED THERAPY INTERVENTIONS, OT EVAL
strengthening/cognitive, visual perceptual/fine motor coordination training/transfer training/balance training/ADL retraining/bed mobility training

## 2018-09-05 NOTE — PHYSICAL THERAPY INITIAL EVALUATION ADULT - PLANNED THERAPY INTERVENTIONS, PT EVAL
transfer training/strengthening/balance training/bed mobility training/gait training/GOAL: Stair Negotiation Training: Patient will be able to negotiate up & down 1 flight of stairs with bilateral rails, step to gait pattern, in 4 weeks.

## 2018-09-06 ENCOUNTER — TRANSCRIPTION ENCOUNTER (OUTPATIENT)
Age: 83
End: 2018-09-06

## 2018-09-06 LAB
APTT BLD: 65.5 SEC — HIGH (ref 27.5–37.4)
HCT VFR BLD CALC: 41 % — SIGNIFICANT CHANGE UP (ref 39–50)
HGB BLD-MCNC: 13.6 G/DL — SIGNIFICANT CHANGE UP (ref 13–17)
MCHC RBC-ENTMCNC: 28.1 PG — SIGNIFICANT CHANGE UP (ref 27–34)
MCHC RBC-ENTMCNC: 33.1 GM/DL — SIGNIFICANT CHANGE UP (ref 32–36)
MCV RBC AUTO: 84.9 FL — SIGNIFICANT CHANGE UP (ref 80–100)
PLATELET # BLD AUTO: 154 K/UL — SIGNIFICANT CHANGE UP (ref 150–400)
RBC # BLD: 4.84 M/UL — SIGNIFICANT CHANGE UP (ref 4.2–5.8)
RBC # FLD: 13.8 % — SIGNIFICANT CHANGE UP (ref 10.3–14.5)
WBC # BLD: 6.5 K/UL — SIGNIFICANT CHANGE UP (ref 3.8–10.5)
WBC # FLD AUTO: 6.5 K/UL — SIGNIFICANT CHANGE UP (ref 3.8–10.5)

## 2018-09-06 RX ORDER — ASPIRIN/CALCIUM CARB/MAGNESIUM 324 MG
81 TABLET ORAL DAILY
Qty: 0 | Refills: 0 | Status: DISCONTINUED | OUTPATIENT
Start: 2018-09-06 | End: 2018-09-08

## 2018-09-06 RX ORDER — ENOXAPARIN SODIUM 100 MG/ML
40 INJECTION SUBCUTANEOUS DAILY
Qty: 0 | Refills: 0 | Status: DISCONTINUED | OUTPATIENT
Start: 2018-09-06 | End: 2018-09-07

## 2018-09-06 RX ORDER — CLOPIDOGREL BISULFATE 75 MG/1
75 TABLET, FILM COATED ORAL DAILY
Qty: 0 | Refills: 0 | Status: DISCONTINUED | OUTPATIENT
Start: 2018-09-06 | End: 2018-09-10

## 2018-09-06 RX ADMIN — ENOXAPARIN SODIUM 40 MILLIGRAM(S): 100 INJECTION SUBCUTANEOUS at 14:42

## 2018-09-06 RX ADMIN — CLOPIDOGREL BISULFATE 75 MILLIGRAM(S): 75 TABLET, FILM COATED ORAL at 14:41

## 2018-09-06 RX ADMIN — ATORVASTATIN CALCIUM 80 MILLIGRAM(S): 80 TABLET, FILM COATED ORAL at 22:20

## 2018-09-06 RX ADMIN — HEPARIN SODIUM 8 UNIT(S)/HR: 5000 INJECTION INTRAVENOUS; SUBCUTANEOUS at 05:37

## 2018-09-06 RX ADMIN — Medication 81 MILLIGRAM(S): at 14:40

## 2018-09-06 NOTE — DISCHARGE NOTE ADULT - PLAN OF CARE
Improvement of symptoms You were found to have a stroke. We have placed you on the medications aspirin and plavix. You will need to follow up with our stroke neurologists after discharge from the hospital. You were found to have a stroke. We have placed you on the medications aspirin and plavix. You will need to follow up with our stroke neurologists after discharge from the hospital. take aspirin and plavix for 3 months followed by aspirin alone.

## 2018-09-06 NOTE — PROGRESS NOTE ADULT - SUBJECTIVE AND OBJECTIVE BOX
THE PATIENT WAS SEEN AND EXAMINED BY ME WITH THE HOUSESTAFF AND STROKE TEAM DURING MORNING ROUNDS.     HPI: 84yo black man with a PMHx of HTN presented to NS as a transfer from [facility] for basilar/vert occlusion. Patient presented acutely and severely dysarthric with right sided weakness, left forced gaze. Patient accompanied by daughter who reported patient is fully functional at baseline. In the ED, CTH NAD. CTA shows distal flow past proximal basilar/vertebral occlusion. On admission: NIHSS 8 MRS 0.     SUBJECTIVE: No events overnight.  No new neurologic complaints.      atorvastatin 80 milliGRAM(s) Oral at bedtime  heparin  Infusion 1000 Unit(s)/Hr IV Continuous <Continuous>    PHYSICAL EXAM:   Vital Signs Last 24 Hrs  T(C): 36.5 (06 Sep 2018 07:00), Max: 36.8 (05 Sep 2018 23:00)  T(F): 97.7 (06 Sep 2018 07:00), Max: 98.3 (05 Sep 2018 23:00)  HR: 57 (06 Sep 2018 10:00) (56 - 77)  BP: 176/88 (06 Sep 2018 10:00) (138/79 - 189/95)  BP(mean): 126 (06 Sep 2018 10:00) (103 - 145)  RR: 10 (06 Sep 2018 10:00) (10 - 37)  SpO2: 98% (06 Sep 2018 10:00) (95% - 100%)    General: No acute distress  HEENT: EOM intact, visual fields full  Abdomen: Soft, nontender, nondistended   Extremities: No edema    NEUROLOGICAL EXAM:  Mental status: Awake, alert, confused, no neglect, normal memory, follows commands   Cranial Nerves: No facial asymmetry, no nystagmus, mild dysarthria,  tongue midline  Motor exam: Bilateral ataxia L >R,  5-/5 RUE, 5/5 RLE, 5/5 LUE, 5/5 LLE  Sensation: Intact to light touch   Coordination/ Gait: ataxia R on finger-to-nose, gait not assessed    LABS:                        13.6   6.5   )-----------( 154      ( 06 Sep 2018 02:33 )             41.0     PTT - ( 06 Sep 2018 02:33 )  PTT:65.5 sec  Hemoglobin A1C, Whole Blood: 5.6 % (09-04 @ 07:10)  Hemoglobin A1C, Whole Blood: 5.6 % (09-04 @ 07:10)    IMAGING: Reviewed by me.     (09.04.18):  MRI BRAIN: Acute bilateral inferior cerebellar hemisphere infarcts. No MR   evidence for hemorrhagic transformation.    MRA HEAD: Nondiagnostic study due to patient motion.    MRA NECK: Limited by motion. Grossly preserved flow-related signal within   the bilateral common and internal carotid arteries, and the bilateral   vertebral arteries.    CT HEAD:   Evolving bilateral cerebellar hemisphere infarcts, without hemorrhagic   transformation.

## 2018-09-06 NOTE — DISCHARGE NOTE ADULT - CARE PROVIDER_API CALL
Mohan Turk), Neurology; Vascular Neurology  [address] [place] [address]  Phone: [phone]  Fax: [phone]

## 2018-09-06 NOTE — DISCHARGE NOTE ADULT - CARE PLAN
Principal Discharge DX:	Basilar artery occlusion  Goal:	Improvement of symptoms  Assessment and plan of treatment:	You were found to have a stroke. We have placed you on the medications aspirin and plavix. You will need to follow up with our stroke neurologists after discharge from the hospital. Principal Discharge DX:	Basilar artery occlusion  Goal:	Improvement of symptoms  Assessment and plan of treatment:	You were found to have a stroke. We have placed you on the medications aspirin and plavix. You will need to follow up with our stroke neurologists after discharge from the hospital. take aspirin and plavix for 3 months followed by aspirin alone.

## 2018-09-06 NOTE — DISCHARGE NOTE ADULT - NS AS DC STROKE ED MATERIALS
Call 911 for Stroke/Risk Factors for Stroke/Need for Followup After Discharge/Stroke Warning Signs and Symptoms/Stroke Education Booklet/Prescribed Medications

## 2018-09-06 NOTE — DISCHARGE NOTE ADULT - PATIENT PORTAL LINK FT
You can access the [facility] Patient Portal, offered by [facility], by registering with the following website: [email or web address]

## 2018-09-06 NOTE — PROGRESS NOTE ADULT - SUBJECTIVE AND OBJECTIVE BOX
Subjective: Patient seen and examined. No new events except as noted.   transferred from SICU to Stroke unit   REVIEW OF SYSTEMS:    CONSTITUTIONAL: + weakness, fevers or chills  EYES/ENT: No visual changes;  No vertigo or throat pain   NECK: No pain or stiffness  RESPIRATORY: No cough, wheezing, hemoptysis; No shortness of breath  CARDIOVASCULAR: No chest pain or palpitations  GASTROINTESTINAL: No abdominal or epigastric pain. No nausea, vomiting, or hematemesis; No diarrhea or constipation. No melena or hematochezia.  GENITOURINARY: No dysuria, frequency or hematuria  NEUROLOGICAL: = numbness or weakness  SKIN: No itching, burning, rashes, or lesions   All other review of systems is negative unless indicated above.    MEDICATIONS:  MEDICATIONS  (STANDING):  aspirin enteric coated 81 milliGRAM(s) Oral daily  atorvastatin 80 milliGRAM(s) Oral at bedtime  clopidogrel Tablet 75 milliGRAM(s) Oral daily  enoxaparin Injectable 40 milliGRAM(s) SubCutaneous daily      PHYSICAL EXAM:  T(C): 36.5 (09-06-18 @ 07:00), Max: 36.8 (09-05-18 @ 23:00)  HR: 57 (09-06-18 @ 10:00) (56 - 77)  BP: 176/88 (09-06-18 @ 10:00) (138/79 - 189/95)  RR: 10 (09-06-18 @ 10:00) (10 - 37)  SpO2: 98% (09-06-18 @ 10:00) (95% - 99%)  Wt(kg): --  I&O's Summary    05 Sep 2018 07:01  -  06 Sep 2018 07:00  --------------------------------------------------------  IN: 194 mL / OUT: 400 mL / NET: -206 mL    06 Sep 2018 07:01  -  06 Sep 2018 12:18  --------------------------------------------------------  IN: 24 mL / OUT: 200 mL / NET: -176 mL          Appearance: Normal	  HEENT:   Normal oral mucosa, PERRL, EOMI	  Lymphatic: No lymphadenopathy , no edema  Cardiovascular: Normal S1 S2, No JVD, No murmurs , Peripheral pulses palpable 2+ bilaterally  Respiratory: Lungs clear to auscultation, normal effort 	  Gastrointestinal:  Soft, Non-tender, + BS	  Skin: No rashes, No ecchymoses, No cyanosis, warm to touch  Musculoskeletal: Normal range of motion, normal strength  Psychiatry:  Mood & affect appropriate  Ext: No edema  NEUROLOGICAL EXAM:  Mental status: Awake, alert, confused, no neglect, normal memory, follows commands   Cranial Nerves: No facial asymmetry, no nystagmus, mild dysarthria,  tongue midline  Motor exam: Bilateral ataxia L >R,  5-/5 RUE, 5/5 RLE, 5/5 LUE, 5/5 LLE  Sensation: Intact to light touch   Coordination/ Gait: ataxia R on finger-to-nose, gait not assessed      LABS:    CARDIAC MARKERS:  CARDIAC MARKERS ( 04 Sep 2018 00:41 )  x     / x     / 63 U/L / x     / 1.6 ng/mL  CARDIAC MARKERS ( 03 Sep 2018 21:25 )  x     / x     / 63 U/L / x     / 1.3 ng/mL  CARDIAC MARKERS ( 03 Sep 2018 15:30 )  x     / x     / 65 u/L / 1.16 ng/mL / x                                    13.6   6.5   )-----------( 154      ( 06 Sep 2018 02:33 )             41.0           proBNP:   Lipid Profile:   HgA1c:   TSH:             TELEMETRY: 	SR    ECG:  	  RADIOLOGY:   DIAGNOSTIC TESTING:  [ ] Echocardiogram:  [ ]  Catheterization:  [ ] Stress Test:    OTHER:

## 2018-09-06 NOTE — DISCHARGE NOTE ADULT - HOSPITAL COURSE
83 year old AA man with multiple vascular risk factors, including age and HTN is evaluated at [facility] for acute onset of dysarthria. On 9/3, he presented to [facility] for acute onset of dysarthria and was subsequently transferred to [facility] for further management of "CTA findings".     CT brain on 9/3 did not show any evidence of acute infarct or hemorrhage. 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 MRI brain on 9/4 showed bilateral PICA distribution and left PCA distribution punctate infarcts and severe leukoaraiosis. TTE did not show any obvious structural cardiac source of embolism nor showed any evidence of a PFO.    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 symptomatic intracranial atherosclerosis.  MRI Brain showed Acute bilateral inferior cerebellar hemisphere infarcts.   ASA and Plavix for second stroke prevention as per [facility]. TTE: EF 75%, mild MR, minimal AR, mild diastolic dysfunction (Stage I), cardiac monitoring without any acute events. dysphagia screen - passed, tolerating diet, Diet: Regular     DISPOSITION: Acute rehab

## 2018-09-06 NOTE — DIETITIAN INITIAL EVALUATION ADULT. - ENERGY NEEDS
Ht: 69"   Wt: 143  BMI: 21.1 kg/m2   IBW: 160 (+/-10%)     89% IBW  Edema: none   Skin: no pressure injuries

## 2018-09-06 NOTE — DISCHARGE NOTE ADULT - MEDICATION SUMMARY - MEDICATIONS TO TAKE
I will START or STAY ON the medications listed below when I get home from the hospital:    aspirin 325 mg oral tablet  -- 1 tab(s) by mouth once a day  -- Indication: For stroke prevention     atorvastatin 80 mg oral tablet  -- 1 tab(s) by mouth once a day (at bedtime)  -- Indication: For Cholesterol medication     clopidogrel 75 mg oral tablet  -- 1 tab(s) by mouth once a day  -- Indication: For stroke prevention    amLODIPine 2.5 mg oral tablet  -- 1 tab(s) by mouth once a day  -- Indication: For HTN (hypertension)

## 2018-09-06 NOTE — DIETITIAN INITIAL EVALUATION ADULT. - OTHER INFO
Pt unable to provide wt hx. Pt seen for: SICU Length Of Stay   Adm dx: CVA    GI issues: denies N/V/D  Last BM: none since adm    Food allergies: NKFA    Vit/supplement PTA: mvi

## 2018-09-06 NOTE — PROGRESS NOTE ADULT - PROBLEM SELECTOR PLAN 1
Monitor on Tele   Started on Heparin gtt   Check TTE   As per neurology recommendations, will plan for ILR if workup is negative.

## 2018-09-06 NOTE — PROGRESS NOTE ADULT - ASSESSMENT
83 years old man with multiple vascular risk factors including age and HTN is evaluated at [facility] for acute onset of dysarthria. On 9/3, he presented to [facility] for acute onset of dysarthria and was subsequently transferred to [facility] for further management of "CTA findings". CT brain on 9/3 did not show any evidence of acute infarct or hemorrhage. 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 MRI brain on 9/4 showed bilateral PICA distribution and left PCA distribution punctate infarcts and severe leukoaraiosis.     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 symptomatic intracranial atherosclerosis with superimposed local thrombosis (vertebral/basilar thrombosis) and artery to artery embolism    NEURO: Neurologically without acute change, Continue close monitoring for neurologic deterioration, permissive HTN to gradual normotension, atorvastatin 80 mg at bedtime considering likely atheroembolic etiology of his stroke, MRI Brain w/o, MRA Head w/o and Neck w/contrast noted above. Physical therapy/OT reccommended AR.     ANTITHROMBOTIC THERAPY: Transition off heparin drip to ASA/Plavix in the setting of basilar thrombosis for secondary stroke prevention    PULMONARY: CXR (09/03)- mild pulmonary vascular congestion, protecting airway, saturating well     CARDIOVASCULAR: TTE: EF 75%, mild MR, minimal AR, mild diastolic dysfunction (Stage I) , cardiac monitoring without any acute events                            SBP goal: Permissive HTN followed by gradual normotension    GASTROINTESTINAL: dysphagia screen- passed, tolerating diet       Diet: Pureed    RENAL: BUN/Cr within normal limits previously, good urine output      Na Goal: Greater than 135     Moody: N    HEMATOLOGY: H/H without acute change, Platelets 154     DVT ppx: Lovenox subq/Venodynes    ID: afebrile, no leukocytosis     DISPOSITION: Acute rehab once stable and workup is complete    CORE MEASURES:        Admission NIHSS: 8     TPA: [] YES [X] NO      LDL/HDL: 114/34     Depression Screen: p     Statin Therapy: Y     Dysphagia Screen: [X] PASS [] FAIL     Smoking [] YES [X] NO      Afib [] YES [X] NO     Stroke Education [] YES [] NO- p

## 2018-09-07 LAB
ANION GAP SERPL CALC-SCNC: 14 MMOL/L — SIGNIFICANT CHANGE UP (ref 5–17)
BUN SERPL-MCNC: 12 MG/DL — SIGNIFICANT CHANGE UP (ref 7–23)
CALCIUM SERPL-MCNC: 9.1 MG/DL — SIGNIFICANT CHANGE UP (ref 8.4–10.5)
CHLORIDE SERPL-SCNC: 102 MMOL/L — SIGNIFICANT CHANGE UP (ref 96–108)
CO2 SERPL-SCNC: 22 MMOL/L — SIGNIFICANT CHANGE UP (ref 22–31)
CREAT SERPL-MCNC: 0.93 MG/DL — SIGNIFICANT CHANGE UP (ref 0.5–1.3)
GLUCOSE SERPL-MCNC: 83 MG/DL — SIGNIFICANT CHANGE UP (ref 70–99)
HCT VFR BLD CALC: 44.3 % — SIGNIFICANT CHANGE UP (ref 39–50)
HGB BLD-MCNC: 14.3 G/DL — SIGNIFICANT CHANGE UP (ref 13–17)
MCHC RBC-ENTMCNC: 27.3 PG — SIGNIFICANT CHANGE UP (ref 27–34)
MCHC RBC-ENTMCNC: 32.3 GM/DL — SIGNIFICANT CHANGE UP (ref 32–36)
MCV RBC AUTO: 84.5 FL — SIGNIFICANT CHANGE UP (ref 80–100)
PLATELET # BLD AUTO: 161 K/UL — SIGNIFICANT CHANGE UP (ref 150–400)
POTASSIUM SERPL-MCNC: 3.9 MMOL/L — SIGNIFICANT CHANGE UP (ref 3.5–5.3)
POTASSIUM SERPL-SCNC: 3.9 MMOL/L — SIGNIFICANT CHANGE UP (ref 3.5–5.3)
RBC # BLD: 5.24 M/UL — SIGNIFICANT CHANGE UP (ref 4.2–5.8)
RBC # FLD: 13.2 % — SIGNIFICANT CHANGE UP (ref 10.3–14.5)
SODIUM SERPL-SCNC: 138 MMOL/L — SIGNIFICANT CHANGE UP (ref 135–145)
WBC # BLD: 5.5 K/UL — SIGNIFICANT CHANGE UP (ref 3.8–10.5)
WBC # FLD AUTO: 5.5 K/UL — SIGNIFICANT CHANGE UP (ref 3.8–10.5)

## 2018-09-07 PROCEDURE — 99222 1ST HOSP IP/OBS MODERATE 55: CPT | Mod: GC

## 2018-09-07 PROCEDURE — 99233 SBSQ HOSP IP/OBS HIGH 50: CPT

## 2018-09-07 RX ORDER — AMLODIPINE BESYLATE 2.5 MG/1
2.5 TABLET ORAL DAILY
Qty: 0 | Refills: 0 | Status: DISCONTINUED | OUTPATIENT
Start: 2018-09-07 | End: 2018-09-10

## 2018-09-07 RX ORDER — ENOXAPARIN SODIUM 100 MG/ML
70 INJECTION SUBCUTANEOUS
Qty: 0 | Refills: 0 | Status: DISCONTINUED | OUTPATIENT
Start: 2018-09-07 | End: 2018-09-08

## 2018-09-07 RX ORDER — WARFARIN SODIUM 2.5 MG/1
2 TABLET ORAL ONCE
Qty: 0 | Refills: 0 | Status: COMPLETED | OUTPATIENT
Start: 2018-09-07 | End: 2018-09-07

## 2018-09-07 RX ADMIN — CLOPIDOGREL BISULFATE 75 MILLIGRAM(S): 75 TABLET, FILM COATED ORAL at 12:52

## 2018-09-07 RX ADMIN — ATORVASTATIN CALCIUM 80 MILLIGRAM(S): 80 TABLET, FILM COATED ORAL at 21:01

## 2018-09-07 RX ADMIN — Medication 81 MILLIGRAM(S): at 12:52

## 2018-09-07 RX ADMIN — WARFARIN SODIUM 2 MILLIGRAM(S): 2.5 TABLET ORAL at 21:01

## 2018-09-07 RX ADMIN — AMLODIPINE BESYLATE 2.5 MILLIGRAM(S): 2.5 TABLET ORAL at 22:16

## 2018-09-07 RX ADMIN — ENOXAPARIN SODIUM 40 MILLIGRAM(S): 100 INJECTION SUBCUTANEOUS at 12:52

## 2018-09-07 RX ADMIN — ENOXAPARIN SODIUM 70 MILLIGRAM(S): 100 INJECTION SUBCUTANEOUS at 18:30

## 2018-09-07 NOTE — PROGRESS NOTE ADULT - SUBJECTIVE AND OBJECTIVE BOX
THE PATIENT WAS SEEN AND EXAMINED BY ME WITH THE HOUSESTAFF AND STROKE TEAM DURING MORNING ROUNDS.   HPI: 84yo black man with a PMHx of HTN presented to [facility] as a transfer from [facility] for basilar/vert occlusion. Patient presented acutely and severely dysarthric with right sided weakness, left forced gaze. Patient accompanied by daughter who reported patient is fully functional at baseline. In the ED, CTH NAD. CTA shows distal flow past proximal basilar/vertebral occlusion. On admission: NIHSS 8 MRS 0.     SUBJECTIVE: No events overnight.  No new neurologic complaints.      aspirin enteric coated 81 milliGRAM(s) Oral daily  atorvastatin 80 milliGRAM(s) Oral at bedtime  clopidogrel Tablet 75 milliGRAM(s) Oral daily  enoxaparin Injectable 70 milliGRAM(s) SubCutaneous two times a day  warfarin 2 milliGRAM(s) Oral once    PHYSICAL EXAM:   Vital Signs Last 24 Hrs  T(C): 36.7 (07 Sep 2018 15:30), Max: 36.7 (06 Sep 2018 20:00)  T(F): 98.1 (07 Sep 2018 15:30), Max: 98.1 (07 Sep 2018 15:30)  HR: 58 (07 Sep 2018 16:00) (54 - 73)  BP: 170/90 (07 Sep 2018 16:00) (122/94 - 184/106)  BP(mean): 115 (07 Sep 2018 16:00) (90 - 123)  RR: 10 (07 Sep 2018 16:00) (6 - 15)  SpO2: 97% (07 Sep 2018 16:00) (95% - 100%)    General: No acute distress  HEENT: EOM intact, visual fields full  Abdomen: Soft, nontender, nondistended   Extremities: No edema    NEUROLOGICAL EXAM:  Mental status: Awake, alert, confused, no neglect, normal memory, follows commands   Cranial Nerves: No facial asymmetry, no nystagmus, mild dysarthria,  tongue midline  Motor exam: Bilateral ataxia L >R,  5-/5 RUE, 5/5 RLE, 5/5 LUE, 5/5 LLE  Sensation: Intact to light touch   Coordination/ Gait: ataxia R on finger-to-nose, gait not assessed    LABS:                        14.3   5.5   )-----------( 161      ( 07 Sep 2018 04:44 )             44.3    09-07    138  |  102  |  12  ----------------------------<  83  3.9   |  22  |  0.93    Ca    9.1      07 Sep 2018 04:44    PTT - ( 06 Sep 2018 02:33 )  PTT:65.5 sec  Hemoglobin A1C, Whole Blood: 5.6 % (09-04 @ 07:10)  Hemoglobin A1C, Whole Blood: 5.6 % (09-04 @ 07:10)    IMAGING: Reviewed by me.     MRI BRAIN: Acute bilateral inferior cerebellar hemisphere infarcts. No MR   evidence for hemorrhagic transformation.    MRA HEAD: Nondiagnostic study due to patient motion.    MRA NECK: Limited by motion. Grossly preserved flow-related signal within   the bilateral common and internal carotid arteries, and the bilateral   vertebral arteries.    CT HEAD:   Evolving bilateral cerebellar hemisphere infarcts, without hemorrhagic   transformation. THE PATIENT WAS SEEN AND EXAMINED BY ME WITH THE HOUSESTAFF AND STROKE TEAM DURING MORNING ROUNDS.     HPI: 84yo black man with a PMHx of HTN presented to [facility] as a transfer from [facility] for basilar/vert occlusion. Patient presented acutely and severely dysarthric with right sided weakness, left forced gaze. Patient accompanied by daughter who reported patient is fully functional at baseline. In the ED, CTH NAD. CTA shows distal flow past proximal basilar/vertebral occlusion. On admission: NIHSS 8 MRS 0.     SUBJECTIVE: No events overnight.  No new neurologic complaints.      aspirin enteric coated 81 milliGRAM(s) Oral daily  atorvastatin 80 milliGRAM(s) Oral at bedtime  clopidogrel Tablet 75 milliGRAM(s) Oral daily  enoxaparin Injectable 70 milliGRAM(s) SubCutaneous two times a day  warfarin 2 milliGRAM(s) Oral once    PHYSICAL EXAM:   Vital Signs Last 24 Hrs  T(C): 36.7 (07 Sep 2018 15:30), Max: 36.7 (06 Sep 2018 20:00)  T(F): 98.1 (07 Sep 2018 15:30), Max: 98.1 (07 Sep 2018 15:30)  HR: 58 (07 Sep 2018 16:00) (54 - 73)  BP: 170/90 (07 Sep 2018 16:00) (122/94 - 184/106)  BP(mean): 115 (07 Sep 2018 16:00) (90 - 123)  RR: 10 (07 Sep 2018 16:00) (6 - 15)  SpO2: 97% (07 Sep 2018 16:00) (95% - 100%)    General: No acute distress  HEENT: EOM intact, visual fields full  Abdomen: Soft, nontender, nondistended   Extremities: No edema    NEUROLOGICAL EXAM:  Mental status: Awake, alert, confused, no neglect, normal memory, follows commands   Cranial Nerves: No facial asymmetry, no nystagmus, mild dysarthria,  tongue midline  Motor exam: Bilateral ataxia L >R,  5-/5 RUE, 5/5 RLE, 5/5 LUE, 5/5 LLE  Sensation: Intact to light touch   Coordination/ Gait: ataxia R on finger-to-nose, gait not assessed    LABS:                        14.3   5.5   )-----------( 161      ( 07 Sep 2018 04:44 )             44.3    09-07    138  |  102  |  12  ----------------------------<  83  3.9   |  22  |  0.93    Ca    9.1      07 Sep 2018 04:44    PTT - ( 06 Sep 2018 02:33 )  PTT:65.5 sec  Hemoglobin A1C, Whole Blood: 5.6 % (09-04 @ 07:10)  Hemoglobin A1C, Whole Blood: 5.6 % (09-04 @ 07:10)    IMAGING: Reviewed by me.     MRI BRAIN: Acute bilateral inferior cerebellar hemisphere infarcts. No MR   evidence for hemorrhagic transformation.    MRA HEAD: Nondiagnostic study due to patient motion.    MRA NECK: Limited by motion. Grossly preserved flow-related signal within   the bilateral common and internal carotid arteries, and the bilateral   vertebral arteries.    CT HEAD:   Evolving bilateral cerebellar hemisphere infarcts, without hemorrhagic   transformation.

## 2018-09-07 NOTE — PROGRESS NOTE ADULT - ASSESSMENT
83 year old AA man with multiple vascular risk factors, including age and HTN is evaluated at [facility] for acute onset of dysarthria. On 9/3, he presented to [facility] for acute onset of dysarthria and was subsequently transferred to [facility] for further management of "CTA findings". CT brain on 9/3 did not show any evidence of acute infarct or hemorrhage. 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 MRI brain on 9/4 showed bilateral PICA distribution and left PCA distribution punctate infarcts and severe leukoaraiosis.     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 symptomatic intracranial atherosclerosis with superimposed local thrombosis (vertebral/basilar thrombosis) and artery to artery embolism    NEURO: Neurologically without acute change, Continue monitoring for neurologic deterioration, permissive HTN to gradual normotension, atorvastatin 80 mg at bedtime considering likely atheroembolic etiology of his stroke, MRI Brain w/o, MRA Head w/o and Neck w/contrast noted above. Physical therapy/OT reccommended AR.     ANTITHROMBOTIC THERAPY: Transition off heparin drip to coumadin in the setting of basilar thrombosis for secondary stroke prevention.     PULMONARY: CXR (09/03)- mild pulmonary vascular congestion, protecting airway, saturating well     CARDIOVASCULAR: TTE: EF 75%, mild MR, minimal AR, mild diastolic dysfunction (Stage I) , cardiac monitoring without any acute events                            SBP goal: Permissive HTN followed by gradual normotension    GASTROINTESTINAL: dysphagia screen- passed, tolerating diet       Diet: Pureed    RENAL: BUN/Cr within normal limits previously, good urine output      Na Goal: Greater than 135     Moody: N    HEMATOLOGY: H/H without acute change, Platelets 161     DVT ppx: Lovenox subq/Venodynes    ID: afebrile, no leukocytosis, no signs or symptoms of infection     DISPOSITION: Acute rehab once stable and workup is complete    CORE MEASURES:        Admission NIHSS: 8     TPA: [] YES [X] NO      LDL/HDL: 114/34     Depression Screen: p     Statin Therapy: Y     Dysphagia Screen: [X] PASS [] FAIL     Smoking [] YES [X] NO      Afib [] YES [X] NO     Stroke Education [] YES [] NO- p 83 year old AA man with multiple vascular risk factors, including age and HTN is evaluated at [facility] for acute onset of dysarthria. On 9/3, he presented to [facility] for acute onset of dysarthria and was subsequently transferred to [facility] for further management of "CTA findings". CT brain on 9/3 did not show any evidence of acute infarct or hemorrhage. 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 MRI brain on 9/4 showed bilateral PICA distribution and left PCA distribution punctate infarcts and severe leukoaraiosis. TTE did not show any obvious structural cardiac source of embolism nor showed any evidence of a PFO.    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 symptomatic intracranial atherosclerosis with superimposed local thrombosis (vertebral/basilar thrombosis) and artery to artery embolism    NEURO: Neurologically without acute change/improvement in neurological examination, continue monitoring for neurologic deterioration, permissive HTN to gradual normotension over the next few weeks, atorvastatin 80 mg at bedtime considering likely atheroembolic etiology of his stroke, MRI Brain w/o, MRA Head w/o and Neck w/contrast noted above. Physical therapy/OT reccommended AR.     ANTITHROMBOTIC THERAPY: Short duration therapeutic anticoagulation (subcutaneous Lovenox with bridge to oral warfarin and goal INR being 2-3) for 3 months followed by antiplatelet therapy indefinitely for second stroke prevention    PULMONARY: CXR (09/03)- mild pulmonary vascular congestion, protecting airway, saturating well     CARDIOVASCULAR: TTE: EF 75%, mild MR, minimal AR, mild diastolic dysfunction (Stage I) , cardiac monitoring without any acute events                            SBP goal: Permissive HTN followed by gradual normotension    GASTROINTESTINAL: dysphagia screen - passed, tolerating diet       Diet: Pureed    RENAL: BUN/Cr within normal limits previously, good urine output      Na Goal: Greater than 135     Moody: N    HEMATOLOGY: H/H without acute change, Platelets 161     DVT ppx: Lovenox subq/Venodynes    ID: afebrile, no leukocytosis, no signs or symptoms of infection     DISPOSITION: Acute rehab once stable and workup is complete    CORE MEASURES:        Admission NIHSS: 8     TPA: [] YES [X] NO      LDL/HDL: 114/34     Depression Screen: p     Statin Therapy: Y     Dysphagia Screen: [X] PASS [] FAIL     Smoking [] YES [X] NO      Afib [] YES [X] NO     Stroke Education [] YES [] NO- p

## 2018-09-07 NOTE — CONSULT NOTE ADULT - SUBJECTIVE AND OBJECTIVE BOX
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 Patient presenting acutely and severely dysarthric with right sided weakness, left forced gaze. Patient was accompanied by daughter on transfer who reported patient is fully functional at baseline. In the ED, [facility] NAD. CTA shows distal flow past proximal basilar/vertebral occlusion. Patient's daughter reported no fevers, chill, ns, cp, sob, abd pain, n/v/d/c, or changes in senses of patient.  NIHSS 8 MRS 0 on 9/3/18.       REVIEW OF SYSTEMS: Unable to obtain due to aphasia     PAST MEDICAL & SURGICAL HISTORY  HTN (hypertension)  No significant past surgical history        FUNCTIONAL HISTORY:   Lives in private house with daughters, 2 ADAM and 12-13 inside  Independent in ambulation and ADLs prior to CVA    CURRENT FUNCTIONAL STATUS:  Bed mobility: min assist  Transfers: min assist x 2 person assist  Ambulation: 25 ft with RW and moderate assist    FAMILY HISTORY   No pertinent family history in first degree relatives      RECENT LABS/IMAGING  CBC Full  -  ( 07 Sep 2018 04:44 )  WBC Count : 5.5 K/uL  Hemoglobin : 14.3 g/dL  Hematocrit : 44.3 %  Platelet Count - Automated : 161 K/uL  Mean Cell Volume : 84.5 fl  Mean Cell Hemoglobin : 27.3 pg  Mean Cell Hemoglobin Concentration : 32.3 gm/dL  Auto Neutrophil # : x  Auto Lymphocyte # : x  Auto Monocyte # : x  Auto Eosinophil # : x  Auto Basophil # : x  Auto Neutrophil % : x  Auto Lymphocyte % : x  Auto Monocyte % : x  Auto Eosinophil % : x  Auto Basophil % : x    09-07    138  |  102  |  12  ----------------------------<  83  3.9   |  22  |  0.93    Ca    9.1      07 Sep 2018 04:44    Head CT 9/4/18: Evolving bilateral cerebellar hemisphere infarcts, without hemorrhagic transformation    MRI brain 9/4/18: Acute bilateral inferior cerebellar hemisphere infarcts    CTA Head/Neck 9/3/18: CT angiography neck: No significant stenosis of the cervical carotid   arteries based on NASCET criteria. Diminished contrast enhancement of the   distal right vertebral V2 segment and occlusion of the right vertebral V3   segment.    CT angiography brain: Occlusion of the right vertebral V4 segment and   distal left vertebral V4 segment after the PICA takeoff. 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 Mild luminal irregularity of the proximal PCAs likely   related to atherosclerotic disease.    Mild stenosis of the proximal left M1 segment of the middle cerebral   artery in comparison to the right. Focal short segment occlusion of a   left M2 MCA segment in the sylvian cistern    VITALS  T(C): 36.5 (09-07-18 @ 07:52), Max: 36.7 (09-06-18 @ 20:00)  HR: 54 (09-07-18 @ 06:00) (54 - 73)  BP: 152/95 (09-07-18 @ 06:00) (152/95 - 207/106)  RR: 11 (09-07-18 @ 06:00) (7 - 15)  SpO2: 99% (09-07-18 @ 06:00) (96% - 100%)    ALLERGIES  No Known Allergies      MEDICATIONS   aspirin enteric coated 81 milliGRAM(s) Oral daily  atorvastatin 80 milliGRAM(s) Oral at bedtime  clopidogrel Tablet 75 milliGRAM(s) Oral daily  enoxaparin Injectable 40 milliGRAM(s) SubCutaneous daily      ----------------------------------------------------------------------------------------  PHYSICAL EXAM  Constitutional - NAD, Comfortable  HEENT - NCAT, left sided gaze  Neck - Supple, No limited ROM  Chest - CTA bilaterally, No wheeze, No rhonchi, No crackles  Cardiovascular - RRR, S1S2, No murmurs  Abdomen - BS+, Soft, NTND  Extremities - No C/C/E, No calf tenderness   Neurologic Exam -                    Cognitive - Aphasic but follows commands     Communication - Unable to examine due to aphasia     Cranial Nerves - left sided eye gaze     Motor - grossly 3/5 in LUE and LLE, 3-/5 in RUE and RLE     Sensory - Intact to LT     Reflexes - DTR Intact, No primitive reflexive     Balance - WNL Static  Psychiatric - Mood stable, Affect WNL      Impression:  83 y.o. 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     Plan:  PT- ROM, Bed Mob, Transfers, Amb w AD and bracing as needed  OT- ADLs, bracing  SLP- Dysphagia eval and treat  Prec- Falls, Cardiac  DVT Prophylaxis  Skin- Turn q2 h, daily skin checks  Dispo- CC: R sided weakness, difficulty speaking      HPI:  82yo M with a PMHx of HTN presents to NS as a transfer from [facility] for basilar/vert occlusion. Patient presenting acutely and severely dysarthric with right sided weakness, left forced gaze. Patient was accompanied by daughter on transfer who reported patient is fully functional at baseline. In the ED, [facility] NAD. CTA shows distal flow past proximal basilar/vertebral occlusion. Patient's daughter reported no fevers, chill, ns, cp, sob, abd pain, n/v/d/c, or changes in senses of patient.  NIHSS 8 MRS 0 on 9/3/18.     Patient denies any complaints at this time.       REVIEW OF SYSTEMS: Denies any chest pain, headache, difficulty breathing, or weakness, other ROS negative    PAST MEDICAL & SURGICAL HISTORY  HTN (hypertension)  No significant past surgical history        FUNCTIONAL HISTORY:   Lives in private house with daughters, 2 ADAM and 12-13 inside  Independent in ambulation and ADLs prior to CVA    CURRENT FUNCTIONAL STATUS:  Bed mobility: contact guard/min assist  Transfers: min assist x 2 person assist  Ambulation: 25 ft with RW and moderate assist    FAMILY HISTORY   No pertinent family history in first degree relatives      RECENT LABS/IMAGING  CBC Full  -  ( 07 Sep 2018 04:44 )  WBC Count : 5.5 K/uL  Hemoglobin : 14.3 g/dL  Hematocrit : 44.3 %  Platelet Count - Automated : 161 K/uL  Mean Cell Volume : 84.5 fl  Mean Cell Hemoglobin : 27.3 pg  Mean Cell Hemoglobin Concentration : 32.3 gm/dL  Auto Neutrophil # : x  Auto Lymphocyte # : x  Auto Monocyte # : x  Auto Eosinophil # : x  Auto Basophil # : x  Auto Neutrophil % : x  Auto Lymphocyte % : x  Auto Monocyte % : x  Auto Eosinophil % : x  Auto Basophil % : x    09-07    138  |  102  |  12  ----------------------------<  83  3.9   |  22  |  0.93    Ca    9.1      07 Sep 2018 04:44    Head CT 9/4/18: Evolving bilateral cerebellar hemisphere infarcts, without hemorrhagic transformation    MRI brain 9/4/18: Acute bilateral inferior cerebellar hemisphere infarcts    CTA Head/Neck 9/3/18: CT angiography neck: No significant stenosis of the cervical carotid   arteries based on NASCET criteria. Diminished contrast enhancement of the   distal right vertebral V2 segment and occlusion of the right vertebral V3   segment.    CT angiography brain: Occlusion of the right vertebral V4 segment and   distal left vertebral V4 segment after the PICA takeoff. 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 Mild luminal irregularity of the proximal PCAs likely   related to atherosclerotic disease.    Mild stenosis of the proximal left M1 segment of the middle cerebral   artery in comparison to the right. Focal short segment occlusion of a   left M2 MCA segment in the sylvian cistern    VITALS  T(C): 36.5 (09-07-18 @ 07:52), Max: 36.7 (09-06-18 @ 20:00)  HR: 54 (09-07-18 @ 06:00) (54 - 73)  BP: 152/95 (09-07-18 @ 06:00) (152/95 - 207/106)  RR: 11 (09-07-18 @ 06:00) (7 - 15)  SpO2: 99% (09-07-18 @ 06:00) (96% - 100%)    ALLERGIES  No Known Allergies      MEDICATIONS   aspirin enteric coated 81 milliGRAM(s) Oral daily  atorvastatin 80 milliGRAM(s) Oral at bedtime  clopidogrel Tablet 75 milliGRAM(s) Oral daily  enoxaparin Injectable 40 milliGRAM(s) SubCutaneous daily      ----------------------------------------------------------------------------------------  PHYSICAL EXAM  Constitutional - NAD, Comfortable  HEENT - NCAT, EOMI  Neck - Supple, No limited ROM  Chest - CTA bilaterally, No wheeze, No rhonchi, No crackles  Cardiovascular - RRR, S1S2, No murmurs  Abdomen - BS+, Soft, NTND  Extremities - No C/C/E, No calf tenderness   Neurologic Exam -                    Cognitive - AAOx2 (thought he was in [facility]), but follows commands, answers simple questions without difficulty     Communication - fluent, mild word finding difficulties     Cranial Nerves - CN 2-12 grossly intact     Motor - 4+/5 in RUE, otherwise 5/5 in other extremities     Sensory - Intact to LT     Reflexes - DTR Intact, No primitive reflexive     Balance - WNL Static  Psychiatric - Mood stable, Affect WNL      Impression:  83 y.o. 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     Plan:  PT- ROM, Bed Mob, Transfers, Amb w AD and bracing as needed  OT- ADLs, bracing  SLP- Dysphagia eval and treat  Prec- Falls, Cardiac  DVT Prophylaxis  Skin- Turn q2 h, daily skin checks  Dispo- CC: R sided weakness, difficulty speaking      HPI:  82yo M with a PMHx of HTN presents to NS as a transfer from [facility] for basilar/vert occlusion. Patient presenting acutely and severely dysarthric with right sided weakness, left forced gaze. Patient was accompanied by daughter on transfer who reported patient is fully functional at baseline. In the ED, [facility] NAD. CTA shows distal flow past proximal basilar/vertebral occlusion. Patient's daughter reported no fevers, chill, ns, cp, sob, abd pain, n/v/d/c, or changes in senses of patient.  NIHSS 8 MRS 0 on 9/3/18.     Patient denies any complaints at this time.     REVIEW OF SYSTEMS: + poor balance Denies any chest pain, headache, difficulty breathing, or weakness, other ROS negative    PAST MEDICAL & SURGICAL HISTORY  HTN (hypertension)  No significant past surgical history    FUNCTIONAL HISTORY:   Lives in private house with daughters, 2 ADAM and 12-13 inside  Independent in ambulation and ADLs prior to CVA    CURRENT FUNCTIONAL STATUS:  Bed mobility: contact guard/min assist  Transfers: min assist x 2 person assist  Ambulation: 25 ft with RW and moderate assist    FAMILY HISTORY   No pertinent family history in first degree relatives    RECENT LABS/IMAGING  CBC Full  -  ( 07 Sep 2018 04:44 )  WBC Count : 5.5 K/uL  Hemoglobin : 14.3 g/dL  Hematocrit : 44.3 %  Platelet Count - Automated : 161 K/uL  Mean Cell Volume : 84.5 fl  Mean Cell Hemoglobin : 27.3 pg  Mean Cell Hemoglobin Concentration : 32.3 gm/dL  Auto Neutrophil # : x  Auto Lymphocyte # : x  Auto Monocyte # : x  Auto Eosinophil # : x  Auto Basophil # : x  Auto Neutrophil % : x  Auto Lymphocyte % : x  Auto Monocyte % : x  Auto Eosinophil % : x  Auto Basophil % : x    09-07    138  |  102  |  12  ----------------------------<  83  3.9   |  22  |  0.93    Ca    9.1      07 Sep 2018 04:44    Head CT 9/4/18: Evolving bilateral cerebellar hemisphere infarcts, without hemorrhagic transformation    MRI brain 9/4/18: Acute bilateral inferior cerebellar hemisphere infarcts    CTA Head/Neck 9/3/18: CT angiography neck: No significant stenosis of the cervical carotid   arteries based on NASCET criteria. Diminished contrast enhancement of the   distal right vertebral V2 segment and occlusion of the right vertebral V3   segment.    CT angiography brain: Occlusion of the right vertebral V4 segment and   distal left vertebral V4 segment after the PICA takeoff. 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 Mild luminal irregularity of the proximal PCAs likely   related to atherosclerotic disease.    Mild stenosis of the proximal left M1 segment of the middle cerebral   artery in comparison to the right. Focal short segment occlusion of a   left M2 MCA segment in the sylvian cistern    VITALS  T(C): 36.5 (09-07-18 @ 07:52), Max: 36.7 (09-06-18 @ 20:00)  HR: 54 (09-07-18 @ 06:00) (54 - 73)  BP: 152/95 (09-07-18 @ 06:00) (152/95 - 207/106)  RR: 11 (09-07-18 @ 06:00) (7 - 15)  SpO2: 99% (09-07-18 @ 06:00) (96% - 100%)    ALLERGIES  No Known Allergies      MEDICATIONS   aspirin enteric coated 81 milliGRAM(s) Oral daily  atorvastatin 80 milliGRAM(s) Oral at bedtime  clopidogrel Tablet 75 milliGRAM(s) Oral daily  enoxaparin Injectable 40 milliGRAM(s) SubCutaneous daily      ----------------------------------------------------------------------------------------  PHYSICAL EXAM  Constitutional - NAD, Comfortable  HEENT - NCAT, EOMI  Neck - Supple, No limited ROM  Chest - CTA bilaterally, No wheeze, No rhonchi, No crackles  Cardiovascular - RRR, S1S2, No murmurs  Abdomen - BS+, Soft, NTND  Extremities - No C/C/E, No calf tenderness   Neurologic Exam -                    Cognitive - AAOx2 (thought he was in [facility]), but follows commands, answers simple questions without difficulty     Communication - fluent, mild word finding difficulties     Cranial Nerves - CN 2-12 grossly intact     Motor - 4+/5 in RUE, otherwise 5/5 in other extremities     Sensory - Intact to LT     Reflexes - DTR Intact, No primitive reflexive     Balance - WNL Static  Psychiatric - Mood stable, Affect WNL      Impression:  83 y.o. 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     Plan:  PT- ROM, Bed Mob, Transfers, Amb w AD and bracing as needed  OT- ADLs, bracing  SLP- Dysphagia eval and treat  Prec- Falls, Cardiac  DVT Prophylaxis  Skin- Turn q2 h, daily skin checks  Dispo- Acute Rehab- can tolerate 3h/d PT/OT/SLP

## 2018-09-07 NOTE — CONSULT NOTE ADULT - ATTENDING COMMENTS
Seen and examined with resident. Agree with note.   Patient with gait dysfunction.  Patient will need acute rehabilitation when stable.

## 2018-09-07 NOTE — PROGRESS NOTE ADULT - ASSESSMENT
52 yo male admitted as  transfer from [facility] for basilar/vert occlusion. Had acutely and severely dysarthric with right sided weakness, left forced gaze. In the ED, [facility] NAD. CTA shows distal flow past proximal basilar/vertebral occlusion

## 2018-09-07 NOTE — PROGRESS NOTE ADULT - SUBJECTIVE AND OBJECTIVE BOX
Subjective: Patient seen and examined. No new events except as noted.   remains in stroke unit   no cp or sob       REVIEW OF SYSTEMS:    CONSTITUTIONAL: + weakness, fevers or chills  EYES/ENT: No visual changes;  No vertigo or throat pain   NECK: No pain or stiffness  RESPIRATORY: No cough, wheezing, hemoptysis; No shortness of breath  CARDIOVASCULAR: No chest pain or palpitations  GASTROINTESTINAL: No abdominal or epigastric pain. No nausea, vomiting, or hematemesis; No diarrhea or constipation. No melena or hematochezia.  GENITOURINARY: No dysuria, frequency or hematuria  NEUROLOGICAL: + numbness or weakness  SKIN: No itching, burning, rashes, or lesions   All other review of systems is negative unless indicated above.    MEDICATIONS:  MEDICATIONS  (STANDING):  aspirin enteric coated 81 milliGRAM(s) Oral daily  atorvastatin 80 milliGRAM(s) Oral at bedtime  clopidogrel Tablet 75 milliGRAM(s) Oral daily  enoxaparin Injectable 40 milliGRAM(s) SubCutaneous daily      PHYSICAL EXAM:  T(C): 36.5 (09-07-18 @ 07:52), Max: 36.7 (09-06-18 @ 20:00)  HR: 54 (09-07-18 @ 06:00) (54 - 73)  BP: 152/95 (09-07-18 @ 06:00) (152/95 - 207/106)  RR: 11 (09-07-18 @ 06:00) (7 - 15)  SpO2: 99% (09-07-18 @ 06:00) (96% - 100%)  Wt(kg): --  I&O's Summary    06 Sep 2018 07:01  -  07 Sep 2018 07:00  --------------------------------------------------------  IN: 144 mL / OUT: 600 mL / NET: -456 mL          Appearance: NAd	  HEENT:  dry oral mucosa, PERRL, EOMI	  Lymphatic: No lymphadenopathy , no edema  Cardiovascular: Normal S1 S2, No JVD, No murmurs , Peripheral pulses palpable 2+ bilaterally  Respiratory: Lungs clear to auscultation, normal effort 	  Gastrointestinal:  Soft, Non-tender, + BS	  Skin: No rashes, No ecchymoses, No cyanosis, warm to touch  Musculoskeletal: decreased range of motion and strength  Psychiatry:  lethargic   Ext: No edema  NEUROLOGICAL EXAM:  Mental status: Awake, alert, confused, no neglect, normal memory, follows commands   Cranial Nerves: No facial asymmetry, no nystagmus, mild dysarthria,  tongue midline  Motor exam: Bilateral ataxia L >R,  5-/5 RUE, 5/5 RLE, 5/5 LUE, 5/5 LLE  Sensation: Intact to light touch   Coordination/ Gait: ataxia R on finger-to-nose, gait not assessed      LABS:    CARDIAC MARKERS:                                14.3   5.5   )-----------( 161      ( 07 Sep 2018 04:44 )             44.3     09-07    138  |  102  |  12  ----------------------------<  83  3.9   |  22  |  0.93    Ca    9.1      07 Sep 2018 04:44      proBNP:   Lipid Profile:   HgA1c:   TSH:             TELEMETRY: 	SR    ECG:  	  RADIOLOGY:   DIAGNOSTIC TESTING:  [ ] Echocardiogram:  < from: Transthoracic Echocardiogram (09.04.18 @ 08:49) >    Patient name: MELONY VITAL  YOB: 1935   Age: 83 (M)   MR#: [id number]  Study Date: 9/4/2018  Location: [place] [id number]: Lindsay Haney RDCS  Study quality: Technically good  Referring Physician: Suzi Mike MD  Blood Pressure: 150/74 mmHg  Height: 175 cm  Weight: 65 kg  BSA: 1.8 m2  ------------------------------------------------------------------------  PROCEDURE: Transthoracic echocardiogram with 2-D, M-Mode  and complete spectral and color flow Doppler.  INDICATION: Cerebral infarction, unspecified (I63.9)  ------------------------------------------------------------------------  Dimensions:    Normal Values:  LA:     4.3    2.0 - 4.0 cm  Ao:     2.9    2.0 - 3.8 cm  SEPTUM: 1.2    0.6 - 1.2 cm  PWT:    1.0   0.6 - 1.1 cm  LVIDd:  4.7    3.0 - 5.6 cm  LVIDs:  2.6    1.8 - 4.0 cm  Derived variables:  LVMI: 105 g/m2  RWT: 0.42  Fractional short: 45 %  EF (Visual Estimate): 75 %  ------------------------------------------------------------------------  Observations:  Mitral Valve: Mild mitral annular calcification, otherwise  normal mitral valve. Mild mitral regurgitation.  Aortic Valve/Aorta: Mildly calcified trileaflet aortic  valve with normal opening. Minimal aortic regurgitation.  Aortic Root: 2.9 cm.  Left Atrium: Mildly dilated left atrium.  LA volume index =  35 cc/m2.  Left Ventricle: Hyperdynamic left ventricular systolic  function. Mild concentric left ventricular hypertrophy.  Mild diastolic dysfunction (Stage I).  Right Heart: Normal right atrium. Normal right ventricular  size and function. Normal tricuspid valve. Minimal  tricuspid regurgitation. Normal pulmonic valve. Mild  pulmonic regurgitation.  Pericardium/Pleura: Normal pericardium with no pericardial  effusion.  Hemodynamic: Estimated right ventricular systolic pressure  equals 15 mm Hg, assuming right atrial pressure equals 3 mm  Hg, consistent with normal pulmonary pressures. Agitated  saline injection demonstrates no evidence of a patent  foramen ovale.  ------------------------------------------------------------------------  Conclusions:  1. Mild mitral annular calcification, otherwise normal  mitral valve. Mild mitral regurgitation.  2. Mildly calcified trileaflet aortic valve with normal  opening. Minimal aortic regurgitation.  3. Mild concentric left ventricular hypertrophy.  4. Hyperdynamic left ventricular systolic function.  5. Mild diastolic dysfunction (Stage I).  6. Normal right ventricular size and function.  7. Agitated saline injection demonstrates no evidence of a  patent foramen ovale.  *** No previous Echo exam.  ------------------------------------------------------------------------  Confirmed on  9/4/2018 - 11:18:42 by Kerline Jaime M.D.  ------------------------------------------------------------------------    < end of copied text >    [ ]  Catheterization:  [ ] Stress Test:    OTHER:

## 2018-09-08 LAB
ANION GAP SERPL CALC-SCNC: 13 MMOL/L — SIGNIFICANT CHANGE UP (ref 5–17)
BUN SERPL-MCNC: 14 MG/DL — SIGNIFICANT CHANGE UP (ref 7–23)
CALCIUM SERPL-MCNC: 9.1 MG/DL — SIGNIFICANT CHANGE UP (ref 8.4–10.5)
CHLORIDE SERPL-SCNC: 101 MMOL/L — SIGNIFICANT CHANGE UP (ref 96–108)
CO2 SERPL-SCNC: 23 MMOL/L — SIGNIFICANT CHANGE UP (ref 22–31)
CREAT SERPL-MCNC: 0.98 MG/DL — SIGNIFICANT CHANGE UP (ref 0.5–1.3)
GLUCOSE SERPL-MCNC: 91 MG/DL — SIGNIFICANT CHANGE UP (ref 70–99)
HCT VFR BLD CALC: 46.6 % — SIGNIFICANT CHANGE UP (ref 39–50)
HGB BLD-MCNC: 15.4 G/DL — SIGNIFICANT CHANGE UP (ref 13–17)
INR BLD: 1.14 RATIO — SIGNIFICANT CHANGE UP (ref 0.88–1.16)
MCHC RBC-ENTMCNC: 28.1 PG — SIGNIFICANT CHANGE UP (ref 27–34)
MCHC RBC-ENTMCNC: 33 GM/DL — SIGNIFICANT CHANGE UP (ref 32–36)
MCV RBC AUTO: 85 FL — SIGNIFICANT CHANGE UP (ref 80–100)
PLATELET # BLD AUTO: 164 K/UL — SIGNIFICANT CHANGE UP (ref 150–400)
POTASSIUM SERPL-MCNC: 4.1 MMOL/L — SIGNIFICANT CHANGE UP (ref 3.5–5.3)
POTASSIUM SERPL-SCNC: 4.1 MMOL/L — SIGNIFICANT CHANGE UP (ref 3.5–5.3)
PROTHROM AB SERPL-ACNC: 12.4 SEC — SIGNIFICANT CHANGE UP (ref 9.8–12.7)
RBC # BLD: 5.48 M/UL — SIGNIFICANT CHANGE UP (ref 4.2–5.8)
RBC # FLD: 13.8 % — SIGNIFICANT CHANGE UP (ref 10.3–14.5)
SODIUM SERPL-SCNC: 137 MMOL/L — SIGNIFICANT CHANGE UP (ref 135–145)
WBC # BLD: 6.4 K/UL — SIGNIFICANT CHANGE UP (ref 3.8–10.5)
WBC # FLD AUTO: 6.4 K/UL — SIGNIFICANT CHANGE UP (ref 3.8–10.5)

## 2018-09-08 PROCEDURE — 99233 SBSQ HOSP IP/OBS HIGH 50: CPT

## 2018-09-08 RX ORDER — ASPIRIN/CALCIUM CARB/MAGNESIUM 324 MG
325 TABLET ORAL DAILY
Qty: 0 | Refills: 0 | Status: DISCONTINUED | OUTPATIENT
Start: 2018-09-09 | End: 2018-09-10

## 2018-09-08 RX ORDER — ENOXAPARIN SODIUM 100 MG/ML
40 INJECTION SUBCUTANEOUS DAILY
Qty: 0 | Refills: 0 | Status: DISCONTINUED | OUTPATIENT
Start: 2018-09-08 | End: 2018-09-10

## 2018-09-08 RX ORDER — POLYETHYLENE GLYCOL 3350 17 G/17G
17 POWDER, FOR SOLUTION ORAL ONCE
Qty: 0 | Refills: 0 | Status: COMPLETED | OUTPATIENT
Start: 2018-09-08 | End: 2018-09-08

## 2018-09-08 RX ADMIN — POLYETHYLENE GLYCOL 3350 17 GRAM(S): 17 POWDER, FOR SOLUTION ORAL at 23:42

## 2018-09-08 RX ADMIN — CLOPIDOGREL BISULFATE 75 MILLIGRAM(S): 75 TABLET, FILM COATED ORAL at 13:40

## 2018-09-08 RX ADMIN — AMLODIPINE BESYLATE 2.5 MILLIGRAM(S): 2.5 TABLET ORAL at 05:16

## 2018-09-08 RX ADMIN — ENOXAPARIN SODIUM 40 MILLIGRAM(S): 100 INJECTION SUBCUTANEOUS at 18:48

## 2018-09-08 RX ADMIN — ATORVASTATIN CALCIUM 80 MILLIGRAM(S): 80 TABLET, FILM COATED ORAL at 21:06

## 2018-09-08 RX ADMIN — Medication 81 MILLIGRAM(S): at 13:40

## 2018-09-08 RX ADMIN — ENOXAPARIN SODIUM 70 MILLIGRAM(S): 100 INJECTION SUBCUTANEOUS at 05:16

## 2018-09-08 NOTE — PROGRESS NOTE ADULT - ASSESSMENT
83 year old AA man with multiple vascular risk factors, including age and HTN is evaluated at [facility] for acute onset of dysarthria. On 9/3, he presented to [facility] for acute onset of dysarthria and was subsequently transferred to [facility] for further management of "CTA findings". CT brain on 9/3 did not show any evidence of acute infarct or hemorrhage. 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 MRI brain on 9/4 showed bilateral PICA distribution and left PCA distribution punctate infarcts and severe leukoaraiosis. TTE did not show any obvious structural cardiac source of embolism nor showed any evidence of a PFO.    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 symptomatic intracranial atherosclerosis with superimposed local thrombosis (vertebral/basilar thrombosis) and artery to artery embolism    NEURO: Neurologically without acute change/improvement in neurological examination, continue monitoring for neurologic deterioration, permissive HTN to gradual normotension over the next few weeks, atorvastatin 80 mg at bedtime considering likely atheroembolic etiology of his stroke, MRI Brain w/o, MRA Head w/o and Neck w/contrast noted above. Physical therapy/OT recommended AR.     ANTITHROMBOTIC THERAPY: ASA and Plavix for second stroke prevention    PULMONARY: CXR (09/03)- mild pulmonary vascular congestion, protecting airway, saturating well     CARDIOVASCULAR: TTE: EF 75%, mild MR, minimal AR, mild diastolic dysfunction (Stage I) , cardiac monitoring without any acute events                            SBP goal: Permissive HTN followed by gradual normotension    GASTROINTESTINAL: dysphagia screen - passed, tolerating diet       Diet: Pureed    RENAL: BUN/Cr within normal limits, good urine output      Na Goal: Greater than 135     Moody: N    HEMATOLOGY: H/H without acute change, Platelets 164     DVT ppx: Lovenox subq/Venodynes    ID: afebrile, no leukocytosis, no signs or symptoms of infection     DISPOSITION: Acute rehab    CORE MEASURES:        Admission NIHSS: 8     TPA: [] YES [X] NO      LDL/HDL: 114/34     Depression Screen: 0     Statin Therapy: Y     Dysphagia Screen: [X] PASS [] FAIL     Smoking [] YES [X] NO      Afib [] YES [X] NO     Stroke Education [x] YES [] NO 83 year old AA man with multiple vascular risk factors, including age and HTN is evaluated at [facility] for acute onset of dysarthria. On 9/3, he presented to [facility] for acute onset of dysarthria and was subsequently transferred to [facility] for further management of "CTA findings". CT brain on 9/3 did not show any evidence of acute infarct or hemorrhage. 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 MRI brain on 9/4 showed bilateral PICA distribution and left PCA distribution punctate infarcts and severe leukoaraiosis. TTE did not show any obvious structural cardiac source of embolism nor showed any evidence of a PFO.    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 symptomatic intracranial atherosclerosis.    NEURO: Neurologically without acute change/improvement in neurological examination, continue monitoring for neurologic deterioration, permissive HTN to gradual normotension over the next few weeks, atorvastatin 80 mg at bedtime considering likely atheroembolic etiology of his stroke, MRI Brain w/o, MRA Head w/o and Neck w/contrast noted above. Physical therapy/OT recommended AR.     ANTITHROMBOTIC THERAPY: ASA and Plavix for second stroke prevention as per [facility]    PULMONARY: CXR (09/03)- mild pulmonary vascular congestion, protecting airway, saturating well     CARDIOVASCULAR: TTE: EF 75%, mild MR, minimal AR, mild diastolic dysfunction (Stage I) , cardiac monitoring without any acute events                            SBP goal: Permissive HTN followed by gradual normotension    GASTROINTESTINAL: dysphagia screen - passed, tolerating diet       Diet: Pureed    RENAL: BUN/Cr within normal limits, good urine output      Na Goal: Greater than 135     Moody: N    HEMATOLOGY: H/H without acute change, Platelets 164     DVT ppx: Lovenox subq/Venodynes    ID: afebrile, no leukocytosis, no signs or symptoms of infection     DISPOSITION: Acute rehab    CORE MEASURES:        Admission NIHSS: 8     TPA: [] YES [X] NO      LDL/HDL: 114/34     Depression Screen: 0     Statin Therapy: Y     Dysphagia Screen: [X] PASS [] FAIL     Smoking [] YES [X] NO      Afib [] YES [X] NO     Stroke Education [x] YES [] NO

## 2018-09-08 NOTE — PROGRESS NOTE ADULT - SUBJECTIVE AND OBJECTIVE BOX
Subjective: Patient seen and examined. No new events except as noted.   remains in stroke unit   no cp or sob     REVIEW OF SYSTEMS:    CONSTITUTIONAL: + weakness, fevers or chills  EYES/ENT: No visual changes;  No vertigo or throat pain   NECK: No pain or stiffness  RESPIRATORY: No cough, wheezing, hemoptysis; No shortness of breath  CARDIOVASCULAR: No chest pain or palpitations  GASTROINTESTINAL: No abdominal or epigastric pain. No nausea, vomiting, or hematemesis; No diarrhea or constipation. No melena or hematochezia.  GENITOURINARY: No dysuria, frequency or hematuria  NEUROLOGICAL: + numbness or weakness  SKIN: No itching, burning, rashes, or lesions   All other review of systems is negative unless indicated above.    MEDICATIONS:  MEDICATIONS  (STANDING):  amLODIPine   Tablet 2.5 milliGRAM(s) Oral daily  atorvastatin 80 milliGRAM(s) Oral at bedtime  clopidogrel Tablet 75 milliGRAM(s) Oral daily  enoxaparin Injectable 40 milliGRAM(s) SubCutaneous daily      PHYSICAL EXAM:  T(C): 36.8 (09-08-18 @ 20:00), Max: 36.8 (09-08-18 @ 10:00)  HR: 66 (09-08-18 @ 20:00) (56 - 81)  BP: 118/79 (09-08-18 @ 20:00) (118/79 - 182/104)  RR: 9 (09-08-18 @ 20:00) (5 - 18)  SpO2: 97% (09-08-18 @ 20:00) (92% - 99%)  Wt(kg): --  I&O's Summary        Appearance: NAD  HEENT:   Normal oral mucosa, PERRL, EOMI	  Lymphatic: No lymphadenopathy , no edema  Cardiovascular: Normal S1 S2, No JVD, No murmurs , Peripheral pulses palpable 2+ bilaterally  Respiratory: Lungs clear to auscultation, normal effort 	  Gastrointestinal:  Soft, Non-tender, + BS	  Skin: No rashes, No ecchymoses, No cyanosis, warm to touch  Musculoskeletal: Normal range of motion, normal strength  Psychiatry:  Mood & affect appropriate  Ext: No edema  NEUROLOGICAL EXAM:  Mental status: Awake, alert, oriented to age, hospital, not time, no neglect, follows commands   Cranial Nerves: No facial asymmetry, no nystagmus, mild dysarthria,  tongue midline  Motor exam: 5-/5 RUE, 5/5 RLE, 5/5 LUE, 5/5 LLE  Sensation: Intact to light touch   Coordination/ Gait: ataxia R on finger-to-nose, gait not assessed      LABS:    CARDIAC MARKERS:                                15.4   6.4   )-----------( 164      ( 08 Sep 2018 04:03 )             46.6     09-08    137  |  101  |  14  ----------------------------<  91  4.1   |  23  |  0.98    Ca    9.1      08 Sep 2018 04:03      proBNP:   Lipid Profile:   HgA1c:   TSH:             TELEMETRY: 	SR    ECG:  	  RADIOLOGY:   DIAGNOSTIC TESTING:  [ ] Echocardiogram:  < from: Transthoracic Echocardiogram (09.04.18 @ 08:49) >    Patient name: MELONY VITAL  YOB: 1935   Age: 83 (M)   MR#: [id number]  Study Date: 9/4/2018  Location: [place] [id number]: Lindsay Haney RDCS  Study quality: Technically good  Referring Physician: Suzi Mike MD  Blood Pressure: 150/74 mmHg  Height: 175 cm  Weight: 65 kg  BSA: 1.8 m2  ------------------------------------------------------------------------  PROCEDURE: Transthoracic echocardiogram with 2-D, M-Mode  and complete spectral and color flow Doppler.  INDICATION: Cerebral infarction, unspecified (I63.9)  ------------------------------------------------------------------------  Dimensions:    Normal Values:  LA:     4.3    2.0 - 4.0 cm  Ao:     2.9    2.0 - 3.8 cm  SEPTUM: 1.2    0.6 - 1.2 cm  PWT:    1.0   0.6 - 1.1 cm  LVIDd:  4.7    3.0 - 5.6 cm  LVIDs:  2.6    1.8 - 4.0 cm  Derived variables:  LVMI: 105 g/m2  RWT: 0.42  Fractional short: 45 %  EF (Visual Estimate): 75 %  ------------------------------------------------------------------------  Observations:  Mitral Valve: Mild mitral annular calcification, otherwise  normal mitral valve. Mild mitral regurgitation.  Aortic Valve/Aorta: Mildly calcified trileaflet aortic  valve with normal opening. Minimal aortic regurgitation.  Aortic Root: 2.9 cm.  Left Atrium: Mildly dilated left atrium.  LA volume index =  35 cc/m2.  Left Ventricle: Hyperdynamic left ventricular systolic  function. Mild concentric left ventricular hypertrophy.  Mild diastolic dysfunction (Stage I).  Right Heart: Normal right atrium. Normal right ventricular  size and function. Normal tricuspid valve. Minimal  tricuspid regurgitation. Normal pulmonic valve. Mild  pulmonic regurgitation.  Pericardium/Pleura: Normal pericardium with no pericardial  effusion.  Hemodynamic: Estimated right ventricular systolic pressure  equals 15 mm Hg, assuming right atrial pressure equals 3 mm  Hg, consistent with normal pulmonary pressures. Agitated  saline injection demonstrates no evidence of a patent  foramen ovale.  ------------------------------------------------------------------------  Conclusions:  1. Mild mitral annular calcification, otherwise normal  mitral valve. Mild mitral regurgitation.  2. Mildly calcified trileaflet aortic valve with normal  opening. Minimal aortic regurgitation.  3. Mild concentric left ventricular hypertrophy.  4. Hyperdynamic left ventricular systolic function.  5. Mild diastolic dysfunction (Stage I).  6. Normal right ventricular size and function.  7. Agitated saline injection demonstrates no evidence of a  patent foramen ovale.  *** No previous Echo exam.  ------------------------------------------------------------------------  Confirmed on  9/4/2018 - 11:18:42 by Kerline Jaime M.D.  ------------------------------------------------------------------------    < end of copied text >    [ ]  Catheterization:  [ ] Stress Test:    OTHER:

## 2018-09-08 NOTE — PROGRESS NOTE ADULT - SUBJECTIVE AND OBJECTIVE BOX
THE PATIENT WAS SEEN AND EXAMINED BY ME WITH THE HOUSESTAFF AND STROKE TEAM DURING MORNING ROUNDS.     HPI: 82yo black man with a PMHx of HTN presented to [facility] as a transfer from [facility] for basilar/vert occlusion. Patient presented acutely and severely dysarthric with right sided weakness, left forced gaze. Patient accompanied by daughter who reported patient is fully functional at baseline. In the ED, CTH NAD. CTA shows distal flow past proximal basilar/vertebral occlusion. On admission: NIHSS 8 MRS 0.     SUBJECTIVE: No events overnight.  No new neurologic complaints.      amLODIPine   Tablet 2.5 milliGRAM(s) Oral daily  aspirin enteric coated 81 milliGRAM(s) Oral daily  atorvastatin 80 milliGRAM(s) Oral at bedtime  clopidogrel Tablet 75 milliGRAM(s) Oral daily  enoxaparin Injectable 40 milliGRAM(s) SubCutaneous daily    PHYSICAL EXAM:   Vital Signs Last 24 Hrs  T(C): 36.6 (08 Sep 2018 04:00), Max: 36.6 (08 Sep 2018 04:00)  T(F): 97.9 (08 Sep 2018 04:00), Max: 97.9 (08 Sep 2018 04:00)  HR: 75 (08 Sep 2018 16:00) (56 - 81)  BP: 139/94 (08 Sep 2018 16:00) (120/82 - 182/104)  BP(mean): 109 (08 Sep 2018 16:00) (109 - 126)  RR: 12 (08 Sep 2018 10:00) (5 - 18)  SpO2: 95% (08 Sep 2018 16:00) (92% - 100%)    General: No acute distress  HEENT: EOM intact, visual fields full  Abdomen: Soft, nontender, nondistended   Extremities: No edema    NEUROLOGICAL EXAM:  Mental status: Awake, alert, oriented to age, hospital, not time, no neglect, follows commands   Cranial Nerves: No facial asymmetry, no nystagmus, mild dysarthria,  tongue midline  Motor exam: Bilateral ataxia L >R,  5-/5 RUE, 5/5 RLE, 5/5 LUE, 5/5 LLE  Sensation: Intact to light touch   Coordination/ Gait: ataxia R on finger-to-nose, gait not assessed    LABS:                        15.4   6.4   )-----------( 164      ( 08 Sep 2018 04:03 )             46.6    09-08    137  |  101  |  14  ----------------------------<  91  4.1   |  23  |  0.98    Ca    9.1      08 Sep 2018 04:03    PT/INR - ( 08 Sep 2018 04:03 )   PT: 12.4 sec;   INR: 1.14 ratio      Hemoglobin A1C, Whole Blood: 5.6 % (09-04 @ 07:10)  Hemoglobin A1C, Whole Blood: 5.6 % (09-04 @ 07:10)    IMAGING: Reviewed by me.     MRI BRAIN: Acute bilateral inferior cerebellar hemisphere infarcts. No MR   evidence for hemorrhagic transformation.    MRA HEAD: Nondiagnostic study due to patient motion.    MRA NECK: Limited by motion. Grossly preserved flow-related signal within   the bilateral common and internal carotid arteries, and the bilateral   vertebral arteries.    CT HEAD:   Evolving bilateral cerebellar hemisphere infarcts, without hemorrhagic   transformation. THE PATIENT WAS SEEN AND EXAMINED BY ME WITH THE HOUSESTAFF AND STROKE TEAM DURING MORNING ROUNDS.     HPI: 84yo black man with a PMHx of HTN presented to [facility] as a transfer from [facility] for basilar/vert occlusion. Patient presented acutely and severely dysarthric with right sided weakness, left forced gaze. Patient accompanied by daughter who reported patient is fully functional at baseline. In the ED, CTH NAD. CTA shows distal flow past proximal basilar/vertebral occlusion. On admission: NIHSS 8 MRS 0.     SUBJECTIVE: No events overnight.  No new neurologic complaints.      amLODIPine   Tablet 2.5 milliGRAM(s) Oral daily  aspirin enteric coated 81 milliGRAM(s) Oral daily  atorvastatin 80 milliGRAM(s) Oral at bedtime  clopidogrel Tablet 75 milliGRAM(s) Oral daily  enoxaparin Injectable 40 milliGRAM(s) SubCutaneous daily    PHYSICAL EXAM:   Vital Signs Last 24 Hrs  T(C): 36.6 (08 Sep 2018 04:00), Max: 36.6 (08 Sep 2018 04:00)  T(F): 97.9 (08 Sep 2018 04:00), Max: 97.9 (08 Sep 2018 04:00)  HR: 75 (08 Sep 2018 16:00) (56 - 81)  BP: 139/94 (08 Sep 2018 16:00) (120/82 - 182/104)  BP(mean): 109 (08 Sep 2018 16:00) (109 - 126)  RR: 12 (08 Sep 2018 10:00) (5 - 18)  SpO2: 95% (08 Sep 2018 16:00) (92% - 100%)    General: No acute distress  HEENT: EOM intact, visual fields full  Abdomen: Soft, nontender, nondistended   Extremities: No edema    NEUROLOGICAL EXAM:  Mental status: Awake, alert, oriented to age, hospital, not time, no neglect, follows commands   Cranial Nerves: No facial asymmetry, no nystagmus, mild dysarthria,  tongue midline  Motor exam: 5-/5 RUE, 5/5 RLE, 5/5 LUE, 5/5 LLE  Sensation: Intact to light touch   Coordination/ Gait: ataxia R on finger-to-nose, gait not assessed    LABS:                        15.4   6.4   )-----------( 164      ( 08 Sep 2018 04:03 )             46.6    09-08    137  |  101  |  14  ----------------------------<  91  4.1   |  23  |  0.98    Ca    9.1      08 Sep 2018 04:03    PT/INR - ( 08 Sep 2018 04:03 )   PT: 12.4 sec;   INR: 1.14 ratio      Hemoglobin A1C, Whole Blood: 5.6 % (09-04 @ 07:10)  Hemoglobin A1C, Whole Blood: 5.6 % (09-04 @ 07:10)    IMAGING: Reviewed by me.     MRI BRAIN: Acute bilateral inferior cerebellar hemisphere infarcts. No MR   evidence for hemorrhagic transformation.    MRA HEAD: Nondiagnostic study due to patient motion.    MRA NECK: Limited by motion. Grossly preserved flow-related signal within   the bilateral common and internal carotid arteries, and the bilateral   vertebral arteries.    CT HEAD:   Evolving bilateral cerebellar hemisphere infarcts, without hemorrhagic   transformation.

## 2018-09-08 NOTE — PROGRESS NOTE ADULT - ASSESSMENT
52 yo male admitted as  transfer from [facility] for basilar/vert occlusion. Had acutely and severely dysarthric with right sided weakness, left forced gaze. In the ED, CTH NAD. CTA shows distal flow past proximal basilar/vertebral occlusion

## 2018-09-09 LAB
ANION GAP SERPL CALC-SCNC: 16 MMOL/L — SIGNIFICANT CHANGE UP (ref 5–17)
BUN SERPL-MCNC: 18 MG/DL — SIGNIFICANT CHANGE UP (ref 7–23)
CALCIUM SERPL-MCNC: 9.2 MG/DL — SIGNIFICANT CHANGE UP (ref 8.4–10.5)
CHLORIDE SERPL-SCNC: 102 MMOL/L — SIGNIFICANT CHANGE UP (ref 96–108)
CO2 SERPL-SCNC: 19 MMOL/L — LOW (ref 22–31)
CREAT SERPL-MCNC: 1.04 MG/DL — SIGNIFICANT CHANGE UP (ref 0.5–1.3)
GLUCOSE SERPL-MCNC: 96 MG/DL — SIGNIFICANT CHANGE UP (ref 70–99)
HCT VFR BLD CALC: 46.1 % — SIGNIFICANT CHANGE UP (ref 39–50)
HGB BLD-MCNC: 15.2 G/DL — SIGNIFICANT CHANGE UP (ref 13–17)
MCHC RBC-ENTMCNC: 28.1 PG — SIGNIFICANT CHANGE UP (ref 27–34)
MCHC RBC-ENTMCNC: 33 GM/DL — SIGNIFICANT CHANGE UP (ref 32–36)
MCV RBC AUTO: 85.2 FL — SIGNIFICANT CHANGE UP (ref 80–100)
PLATELET # BLD AUTO: 168 K/UL — SIGNIFICANT CHANGE UP (ref 150–400)
POTASSIUM SERPL-MCNC: 4.3 MMOL/L — SIGNIFICANT CHANGE UP (ref 3.5–5.3)
POTASSIUM SERPL-SCNC: 4.3 MMOL/L — SIGNIFICANT CHANGE UP (ref 3.5–5.3)
RBC # BLD: 5.41 M/UL — SIGNIFICANT CHANGE UP (ref 4.2–5.8)
RBC # FLD: 13.4 % — SIGNIFICANT CHANGE UP (ref 10.3–14.5)
SODIUM SERPL-SCNC: 137 MMOL/L — SIGNIFICANT CHANGE UP (ref 135–145)
WBC # BLD: 7.2 K/UL — SIGNIFICANT CHANGE UP (ref 3.8–10.5)
WBC # FLD AUTO: 7.2 K/UL — SIGNIFICANT CHANGE UP (ref 3.8–10.5)

## 2018-09-09 PROCEDURE — 99233 SBSQ HOSP IP/OBS HIGH 50: CPT

## 2018-09-09 RX ADMIN — ATORVASTATIN CALCIUM 80 MILLIGRAM(S): 80 TABLET, FILM COATED ORAL at 21:53

## 2018-09-09 RX ADMIN — CLOPIDOGREL BISULFATE 75 MILLIGRAM(S): 75 TABLET, FILM COATED ORAL at 12:10

## 2018-09-09 RX ADMIN — Medication 325 MILLIGRAM(S): at 12:10

## 2018-09-09 RX ADMIN — ENOXAPARIN SODIUM 40 MILLIGRAM(S): 100 INJECTION SUBCUTANEOUS at 12:10

## 2018-09-09 NOTE — PROGRESS NOTE ADULT - ASSESSMENT
83 year old AA man with multiple vascular risk factors, including age and HTN is evaluated at [facility] for acute onset of dysarthria. On 9/3, he presented to [facility] for acute onset of dysarthria and was subsequently transferred to [facility] for further management of "CTA findings". CT brain on 9/3 did not show any evidence of acute infarct or hemorrhage. 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 MRI brain on 9/4 showed bilateral PICA distribution and left PCA distribution punctate infarcts and severe leukoaraiosis. TTE did not show any obvious structural cardiac source of embolism nor showed any evidence of a PFO.    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 symptomatic intracranial atherosclerosis.    NEURO: Neurologically without acute change/improvement in neurological examination, continue monitoring for neurologic deterioration, permissive HTN to gradual normotension over the next few weeks, atorvastatin 80 mg at bedtime considering likely atheroembolic etiology of his stroke, MRI Brain w/o, MRA Head w/o and Neck w/contrast noted above. Physical therapy/OT recommended AR.     ANTITHROMBOTIC THERAPY: ASA and Plavix for second stroke prevention as per [facility]    PULMONARY: CXR (09/03)- mild pulmonary vascular congestion, protecting airway, saturating well     CARDIOVASCULAR: TTE: EF 75%, mild MR, minimal AR, mild diastolic dysfunction (Stage I) , cardiac monitoring without any acute events                            SBP goal: Permissive HTN followed by gradual normotension    GASTROINTESTINAL: dysphagia screen - passed, tolerating diet       Diet: Pureed    RENAL: BUN/Cr within normal limits, good urine output      Na Goal: Greater than 135     Moody: N    HEMATOLOGY: H/H without acute change, Platelets 168     DVT ppx: Lovenox subq/Venodynes    ID: afebrile, no leukocytosis, no signs or symptoms of infection     DISPOSITION: Acute rehab    CORE MEASURES:        Admission NIHSS: 8     TPA: [] YES [X] NO      LDL/HDL: 114/34     Depression Screen: 0     Statin Therapy: Y     Dysphagia Screen: [X] PASS [] FAIL     Smoking [] YES [X] NO      Afib [] YES [X] NO     Stroke Education [x] YES [] NO 83 year old AA man with multiple vascular risk factors, including age and HTN is evaluated at [facility] for acute onset of dysarthria. On 9/3, he presented to [facility] for acute onset of dysarthria and was subsequently transferred to [facility] for further management of "CTA findings". CT brain on 9/3 did not show any evidence of acute infarct or hemorrhage. 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 MRI brain on 9/4 showed bilateral PICA distribution and left PCA distribution punctate infarcts and severe leukoaraiosis. TTE did not show any obvious structural cardiac source of embolism nor showed any evidence of a PFO.    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 symptomatic intracranial atherosclerosis.    NEURO: Neurologically without acute change/improvement in neurological examination, continue monitoring for neurologic deterioration, permissive HTN to gradual normotension over the next few weeks, atorvastatin 80 mg at bedtime considering likely atheroembolic etiology of his stroke, MRI Brain w/o, MRA Head w/o and Neck w/contrast noted above. Physical therapy/OT recommended AR.     ANTITHROMBOTIC THERAPY: ASA and Plavix for second stroke prevention as per [facility]    PULMONARY: CXR (09/03)- mild pulmonary vascular congestion, protecting airway, saturating well     CARDIOVASCULAR: TTE: EF 75%, mild MR, minimal AR, mild diastolic dysfunction (Stage I), cardiac monitoring without any acute events                            SBP goal: Permissive HTN followed by gradual normotension    GASTROINTESTINAL: dysphagia screen - passed, tolerating diet       Diet: Pureed    RENAL: BUN/Cr within normal limits, good urine output      Na Goal: Greater than 135     Moody: N    HEMATOLOGY: H/H without acute change, Platelets 168     DVT ppx: Lovenox subq/Venodynes    ID: afebrile, no leukocytosis, no signs or symptoms of infection     DISPOSITION: Acute rehab    CORE MEASURES:        Admission NIHSS: 8     TPA: [] YES [X] NO      LDL/HDL: 114/34     Depression Screen: 0     Statin Therapy: Y     Dysphagia Screen: [X] PASS [] FAIL     Smoking [] YES [X] NO      Afib [] YES [X] NO     Stroke Education [x] YES [] NO 83 year old AA man with multiple vascular risk factors, including age and HTN is evaluated at [facility] for acute onset of dysarthria. On 9/3, he presented to [facility] for acute onset of dysarthria and was subsequently transferred to [facility] for further management of "CTA findings". CT brain on 9/3 did not show any evidence of acute infarct or hemorrhage. 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 MRI brain on 9/4 showed bilateral PICA distribution and left PCA distribution punctate infarcts and severe leukoaraiosis. TTE did not show any obvious structural cardiac source of embolism nor showed any evidence of a PFO.    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 symptomatic intracranial atherosclerosis.    NEURO: Neurologically without acute change/improvement in neurological examination, continue monitoring for neurologic deterioration, permissive HTN to gradual normotension over the next few weeks, atorvastatin 80 mg at bedtime considering likely atheroembolic etiology of his stroke, MRI Brain w/o, MRA Head w/o and Neck w/contrast noted above. Physical therapy/OT recommended AR.     ANTITHROMBOTIC THERAPY: ASA and Plavix for second stroke prevention as per [facility]IS    PULMONARY: CXR (09/03)- mild pulmonary vascular congestion, protecting airway, saturating well     CARDIOVASCULAR: TTE: EF 75%, mild MR, minimal AR, mild diastolic dysfunction (Stage I), cardiac monitoring without any acute events. Should consider 30 day holter monitoring as outpatient to evaluate for concomitant competing mechanism for ischemic stroke.                            SBP goal: Permissive HTN followed by gradual normotension    GASTROINTESTINAL: dysphagia screen - passed, tolerating diet       Diet: Pureed    RENAL: BUN/Cr within normal limits, good urine output      Na Goal: Greater than 135     Moody: N    HEMATOLOGY: H/H without acute change, Platelets 168     DVT ppx: Lovenox subq/Venodynes    ID: afebrile, no leukocytosis, no signs or symptoms of infection     DISPOSITION: Acute rehab    CORE MEASURES:        Admission NIHSS: 8     TPA: [] YES [X] NO      LDL/HDL: 114/34     Depression Screen: 0     Statin Therapy: Y     Dysphagia Screen: [X] PASS [] FAIL     Smoking [] YES [X] NO      Afib [] YES [X] NO     Stroke Education [x] YES [] NO

## 2018-09-09 NOTE — PROGRESS NOTE ADULT - ASSESSMENT
54 yo male admitted as  transfer from [facility] for basilar/vert occlusion. Had acutely and severely dysarthric with right sided weakness, left forced gaze. In the ED, CTH NAD. CTA shows distal flow past proximal basilar/vertebral occlusion

## 2018-09-09 NOTE — PROGRESS NOTE ADULT - SUBJECTIVE AND OBJECTIVE BOX
Subjective: Patient seen and examined. No new events except as noted.   remains in Stroke unit   no cp or sob     REVIEW OF SYSTEMS:    CONSTITUTIONAL: + weakness, fevers or chills  EYES/ENT: No visual changes;  No vertigo or throat pain   NECK: No pain or stiffness  RESPIRATORY: No cough, wheezing, hemoptysis; No shortness of breath  CARDIOVASCULAR: No chest pain or palpitations  GASTROINTESTINAL: No abdominal or epigastric pain. No nausea, vomiting, or hematemesis; No diarrhea or constipation. No melena or hematochezia.  GENITOURINARY: No dysuria, frequency or hematuria  NEUROLOGICAL: + numbness or weakness  SKIN: No itching, burning, rashes, or lesions   All other review of systems is negative unless indicated above.    MEDICATIONS:  MEDICATIONS  (STANDING):  amLODIPine   Tablet 2.5 milliGRAM(s) Oral daily  aspirin 325 milliGRAM(s) Oral daily  atorvastatin 80 milliGRAM(s) Oral at bedtime  clopidogrel Tablet 75 milliGRAM(s) Oral daily  enoxaparin Injectable 40 milliGRAM(s) SubCutaneous daily      PHYSICAL EXAM:  T(C): 36.8 (09-08-18 @ 20:00), Max: 36.8 (09-08-18 @ 20:00)  HR: 65 (09-09-18 @ 08:00) (58 - 81)  BP: 136/84 (09-09-18 @ 08:00) (118/79 - 163/98)  RR: 17 (09-09-18 @ 08:00) (9 - 17)  SpO2: 98% (09-09-18 @ 08:00) (92% - 98%)  Wt(kg): --  I&O's Summary    08 Sep 2018 07:01  -  09 Sep 2018 07:00  --------------------------------------------------------  IN: 0 mL / OUT: 150 mL / NET: -150 mL          Appearance: NAD  HEENT:   Normal oral mucosa, PERRL, EOMI	  Lymphatic: No lymphadenopathy , no edema  Cardiovascular: Normal S1 S2, No JVD, No murmurs , Peripheral pulses palpable 2+ bilaterally  Respiratory: Lungs clear to auscultation, normal effort 	  Gastrointestinal:  Soft, Non-tender, + BS	  Skin: No rashes, No ecchymoses, No cyanosis, warm to touch  Musculoskeletal: Normal range of motion, normal strength  Psychiatry:  Mood & affect appropriate  Ext: No edema  NEUROLOGICAL EXAM:  Mental status: Awake, alert, oriented to age, hospital, not time, no neglect, follows commands   Cranial Nerves: No facial asymmetry, no nystagmus, mild dysarthria,  tongue midline  Motor exam: 5-/5 RUE, 5/5 RLE, 5/5 LUE, 5/5 LLE  Sensation: Intact to light touch   Coordination/ Gait: ataxia R on finger-to-nose, gait not assessed        LABS:    CARDIAC MARKERS:                                15.2   7.2   )-----------( 168      ( 09 Sep 2018 02:06 )             46.1     09-09    137  |  102  |  18  ----------------------------<  96  4.3   |  19<L>  |  1.04    Ca    9.2      09 Sep 2018 02:06      proBNP:   Lipid Profile:   HgA1c:   TSH:             TELEMETRY: 	SR    ECG:  	  RADIOLOGY:   DIAGNOSTIC TESTING:  [ ] Echocardiogram:  [ ]  Catheterization:  [ ] Stress Test:    OTHER:

## 2018-09-09 NOTE — PROGRESS NOTE ADULT - NSHPATTENDINGPLANDISCUSS_GEN_ALL_CORE
Neurology resident, PA, NP and vascular neurology fellow on stroke service
Neurology residents, NP and vascular neurology fellow
PARTH Coleman and PARTH Camarena MD fellow
PARTH Coleman and PARTH Ramirez MD fellow

## 2018-09-09 NOTE — PROGRESS NOTE ADULT - SUBJECTIVE AND OBJECTIVE BOX
THE PATIENT WAS SEEN AND EXAMINED BY ME WITH THE HOUSESTAFF AND STROKE TEAM DURING MORNING ROUNDS.     HPI: 84yo black man with a PMHx of HTN presented to [facility] as a transfer from [facility] for basilar/vert occlusion. Patient presented acutely and severely dysarthric with right sided weakness, left forced gaze. Patient accompanied by daughter who reported patient is fully functional at baseline. In the ED, CTH NAD. CTA shows distal flow past proximal basilar/vertebral occlusion. On admission: NIHSS 8 MRS 0.     SUBJECTIVE: No events overnight.  No new neurologic complaints.      amLODIPine   Tablet 2.5 milliGRAM(s) Oral daily  aspirin 325 milliGRAM(s) Oral daily  atorvastatin 80 milliGRAM(s) Oral at bedtime  clopidogrel Tablet 75 milliGRAM(s) Oral daily  enoxaparin Injectable 40 milliGRAM(s) SubCutaneous daily    PHYSICAL EXAM:   Vital Signs Last 24 Hrs  T(C): 36.8 (08 Sep 2018 20:00), Max: 36.8 (08 Sep 2018 10:00)  T(F): 98.2 (08 Sep 2018 20:00), Max: 98.2 (08 Sep 2018 10:00)  HR: 65 (09 Sep 2018 08:00) (58 - 81)  BP: 136/84 (09 Sep 2018 08:00) (118/79 - 163/98)  BP(mean): 93 (09 Sep 2018 08:00) (91 - 117)  RR: 17 (09 Sep 2018 08:00) (9 - 17)  SpO2: 98% (09 Sep 2018 08:00) (92% - 98%)    General: No acute distress  HEENT: EOM intact, visual fields full  Abdomen: Soft, nontender, nondistended   Extremities: No edema    NEUROLOGICAL EXAM:  Mental status: Awake, alert, oriented to age, hospital, not time, no neglect, follows commands   Cranial Nerves: No facial asymmetry, no nystagmus, mild dysarthria,  tongue midline  Motor exam: 5-/5 RUE, 5/5 RLE, 5/5 LUE, 5/5 LLE  Sensation: Intact to light touch   Coordination/ Gait: ataxia R on finger-to-nose, gait not assessed    LABS:                        15.2   7.2   )-----------( 168      ( 09 Sep 2018 02:06 )             46.1    09-09    137  |  102  |  18  ----------------------------<  96  4.3   |  19<L>  |  1.04    Ca    9.2      09 Sep 2018 02:06    PT/INR - ( 08 Sep 2018 04:03 )   PT: 12.4 sec;   INR: 1.14 ratio     Hemoglobin A1C, Whole Blood: 5.6 % (09-04 @ 07:10)  Hemoglobin A1C, Whole Blood: 5.6 % (09-04 @ 07:10)    IMAGING: Reviewed by me.     MRI BRAIN: Acute bilateral inferior cerebellar hemisphere infarcts. No MR   evidence for hemorrhagic transformation.    MRA HEAD: Nondiagnostic study due to patient motion.    MRA NECK: Limited by motion. Grossly preserved flow-related signal within   the bilateral common and internal carotid arteries, and the bilateral   vertebral arteries.    CT HEAD: Evolving bilateral cerebellar hemisphere infarcts, without hemorrhagic   transformation.

## 2018-09-10 ENCOUNTER — INPATIENT (INPATIENT)
Facility: HOSPITAL | Age: 83
LOS: 8 days | Discharge: HOME HEALTH | DRG: 949 | End: 2018-09-19
Attending: STUDENT IN AN ORGANIZED HEALTH CARE EDUCATION/TRAINING PROGRAM | Admitting: STUDENT IN AN ORGANIZED HEALTH CARE EDUCATION/TRAINING PROGRAM
Payer: MEDICAID

## 2018-09-10 VITALS
SYSTOLIC BLOOD PRESSURE: 123 MMHG | HEART RATE: 67 BPM | OXYGEN SATURATION: 97 % | DIASTOLIC BLOOD PRESSURE: 77 MMHG | RESPIRATION RATE: 13 BRPM

## 2018-09-10 VITALS
HEART RATE: 81 BPM | SYSTOLIC BLOOD PRESSURE: 144 MMHG | WEIGHT: 159.61 LBS | RESPIRATION RATE: 14 BRPM | HEIGHT: 67 IN | TEMPERATURE: 98 F | DIASTOLIC BLOOD PRESSURE: 94 MMHG

## 2018-09-10 DIAGNOSIS — I63.9 CEREBRAL INFARCTION, UNSPECIFIED: ICD-10-CM

## 2018-09-10 DIAGNOSIS — Z71.89 OTHER SPECIFIED COUNSELING: ICD-10-CM

## 2018-09-10 PROBLEM — I10 ESSENTIAL (PRIMARY) HYPERTENSION: Chronic | Status: ACTIVE | Noted: 2018-09-03

## 2018-09-10 LAB
ANION GAP SERPL CALC-SCNC: 11 MMOL/L — SIGNIFICANT CHANGE UP (ref 5–17)
BUN SERPL-MCNC: 18 MG/DL — SIGNIFICANT CHANGE UP (ref 7–23)
CALCIUM SERPL-MCNC: 8.6 MG/DL — SIGNIFICANT CHANGE UP (ref 8.4–10.5)
CHLORIDE SERPL-SCNC: 102 MMOL/L — SIGNIFICANT CHANGE UP (ref 96–108)
CO2 SERPL-SCNC: 21 MMOL/L — LOW (ref 22–31)
CREAT SERPL-MCNC: 1.16 MG/DL — SIGNIFICANT CHANGE UP (ref 0.5–1.3)
GLUCOSE SERPL-MCNC: 91 MG/DL — SIGNIFICANT CHANGE UP (ref 70–99)
HCT VFR BLD CALC: 39.1 % — SIGNIFICANT CHANGE UP (ref 39–50)
HGB BLD-MCNC: 13.2 G/DL — SIGNIFICANT CHANGE UP (ref 13–17)
MCHC RBC-ENTMCNC: 28.8 PG — SIGNIFICANT CHANGE UP (ref 27–34)
MCHC RBC-ENTMCNC: 33.7 GM/DL — SIGNIFICANT CHANGE UP (ref 32–36)
MCV RBC AUTO: 85.4 FL — SIGNIFICANT CHANGE UP (ref 80–100)
PLATELET # BLD AUTO: 149 K/UL — LOW (ref 150–400)
POTASSIUM SERPL-MCNC: 3.6 MMOL/L — SIGNIFICANT CHANGE UP (ref 3.5–5.3)
POTASSIUM SERPL-SCNC: 3.6 MMOL/L — SIGNIFICANT CHANGE UP (ref 3.5–5.3)
RBC # BLD: 4.57 M/UL — SIGNIFICANT CHANGE UP (ref 4.2–5.8)
RBC # FLD: 13.7 % — SIGNIFICANT CHANGE UP (ref 10.3–14.5)
SODIUM SERPL-SCNC: 134 MMOL/L — LOW (ref 135–145)
WBC # BLD: 5.8 K/UL — SIGNIFICANT CHANGE UP (ref 3.8–10.5)
WBC # FLD AUTO: 5.8 K/UL — SIGNIFICANT CHANGE UP (ref 3.8–10.5)

## 2018-09-10 PROCEDURE — 86900 BLOOD TYPING SEROLOGIC ABO: CPT

## 2018-09-10 PROCEDURE — 93306 TTE W/DOPPLER COMPLETE: CPT

## 2018-09-10 PROCEDURE — 70553 MRI BRAIN STEM W/O & W/DYE: CPT

## 2018-09-10 PROCEDURE — 84484 ASSAY OF TROPONIN QUANT: CPT

## 2018-09-10 PROCEDURE — 80048 BASIC METABOLIC PNL TOTAL CA: CPT

## 2018-09-10 PROCEDURE — 70548 MR ANGIOGRAPHY NECK W/DYE: CPT

## 2018-09-10 PROCEDURE — 86850 RBC ANTIBODY SCREEN: CPT

## 2018-09-10 PROCEDURE — 93005 ELECTROCARDIOGRAM TRACING: CPT

## 2018-09-10 PROCEDURE — 85610 PROTHROMBIN TIME: CPT

## 2018-09-10 PROCEDURE — 97110 THERAPEUTIC EXERCISES: CPT

## 2018-09-10 PROCEDURE — 85027 COMPLETE CBC AUTOMATED: CPT

## 2018-09-10 PROCEDURE — 83036 HEMOGLOBIN GLYCOSYLATED A1C: CPT

## 2018-09-10 PROCEDURE — 82553 CREATINE MB FRACTION: CPT

## 2018-09-10 PROCEDURE — 82550 ASSAY OF CK (CPK): CPT

## 2018-09-10 PROCEDURE — 70544 MR ANGIOGRAPHY HEAD W/O DYE: CPT

## 2018-09-10 PROCEDURE — 80053 COMPREHEN METABOLIC PANEL: CPT

## 2018-09-10 PROCEDURE — 85730 THROMBOPLASTIN TIME PARTIAL: CPT

## 2018-09-10 PROCEDURE — 97166 OT EVAL MOD COMPLEX 45 MIN: CPT

## 2018-09-10 PROCEDURE — 71045 X-RAY EXAM CHEST 1 VIEW: CPT

## 2018-09-10 PROCEDURE — 80061 LIPID PANEL: CPT

## 2018-09-10 PROCEDURE — 70450 CT HEAD/BRAIN W/O DYE: CPT

## 2018-09-10 PROCEDURE — 99291 CRITICAL CARE FIRST HOUR: CPT

## 2018-09-10 PROCEDURE — 86901 BLOOD TYPING SEROLOGIC RH(D): CPT

## 2018-09-10 PROCEDURE — 97162 PT EVAL MOD COMPLEX 30 MIN: CPT

## 2018-09-10 PROCEDURE — A9585: CPT

## 2018-09-10 PROCEDURE — 97116 GAIT TRAINING THERAPY: CPT

## 2018-09-10 RX ORDER — CLOPIDOGREL BISULFATE 75 MG/1
1 TABLET, FILM COATED ORAL
Qty: 0 | Refills: 0 | COMMUNITY
Start: 2018-09-10

## 2018-09-10 RX ORDER — ATORVASTATIN CALCIUM 80 MG/1
80 TABLET, FILM COATED ORAL AT BEDTIME
Qty: 0 | Refills: 0 | Status: DISCONTINUED | OUTPATIENT
Start: 2018-09-10 | End: 2018-09-19

## 2018-09-10 RX ORDER — ASPIRIN/CALCIUM CARB/MAGNESIUM 324 MG
1 TABLET ORAL
Qty: 0 | Refills: 0 | COMMUNITY
Start: 2018-09-10

## 2018-09-10 RX ORDER — ENOXAPARIN SODIUM 100 MG/ML
40 INJECTION SUBCUTANEOUS DAILY
Qty: 0 | Refills: 0 | Status: DISCONTINUED | OUTPATIENT
Start: 2018-09-10 | End: 2018-09-19

## 2018-09-10 RX ORDER — CLOPIDOGREL BISULFATE 75 MG/1
1 TABLET, FILM COATED ORAL
Qty: 0 | Refills: 0 | COMMUNITY
Start: 2018-09-10 | End: 2018-12-10

## 2018-09-10 RX ORDER — AMLODIPINE BESYLATE 2.5 MG/1
1 TABLET ORAL
Qty: 0 | Refills: 0 | COMMUNITY
Start: 2018-09-10

## 2018-09-10 RX ORDER — AMLODIPINE BESYLATE 2.5 MG/1
2.5 TABLET ORAL DAILY
Qty: 0 | Refills: 0 | Status: DISCONTINUED | OUTPATIENT
Start: 2018-09-10 | End: 2018-09-11

## 2018-09-10 RX ORDER — CLOPIDOGREL BISULFATE 75 MG/1
75 TABLET, FILM COATED ORAL DAILY
Qty: 0 | Refills: 0 | Status: DISCONTINUED | OUTPATIENT
Start: 2018-09-10 | End: 2018-09-19

## 2018-09-10 RX ORDER — ATORVASTATIN CALCIUM 80 MG/1
1 TABLET, FILM COATED ORAL
Qty: 0 | Refills: 0 | COMMUNITY
Start: 2018-09-10

## 2018-09-10 RX ORDER — ASPIRIN/CALCIUM CARB/MAGNESIUM 324 MG
325 TABLET ORAL DAILY
Qty: 0 | Refills: 0 | Status: DISCONTINUED | OUTPATIENT
Start: 2018-09-10 | End: 2018-09-13

## 2018-09-10 RX ADMIN — Medication 325 MILLIGRAM(S): at 12:47

## 2018-09-10 RX ADMIN — ENOXAPARIN SODIUM 40 MILLIGRAM(S): 100 INJECTION SUBCUTANEOUS at 12:47

## 2018-09-10 RX ADMIN — CLOPIDOGREL BISULFATE 75 MILLIGRAM(S): 75 TABLET, FILM COATED ORAL at 12:47

## 2018-09-10 RX ADMIN — ATORVASTATIN CALCIUM 80 MILLIGRAM(S): 80 TABLET, FILM COATED ORAL at 21:19

## 2018-09-10 NOTE — H&P ADULT - NSHPPHYSICALEXAM_GEN_ALL_CORE
PHYSICAL EXAM  VITALS  T(C): 36.7 (09-10-18 @ 18:39), Max: 36.9 (09-09-18 @ 20:00)  HR: 81 (09-10-18 @ 18:39) (54 - 81)  BP: 144/94 (09-10-18 @ 18:39) (96/78 - 144/94)  RR: 14 (09-10-18 @ 18:39) (11 - 25)  SpO2: 97% (09-10-18 @ 16:00) (94% - 99%)    Gen - NAD, Comfortable  HEENT - NCAT, EOMI  Neck - Supple, No limited ROM  Pulm - CTAB, No wheeze, No rhonchi, No crackles  Cardiovascular - RRR, S1S2, No murmurs  Abdomen - Soft, NT/ND, +BS  Extremities - No C/C/E, No calf tenderness  Neuro-     Cognitive - AAOx3; required cuing for year     Communication - Fluent, No dysarthria     Attention: required cuing for days of the wk backwards     Judgement: impaired judgement; does not acknowledge deficits     Memory: Recall 2 objects 3 min later         Cranial Nerves - CN 2-12 intact B/L     Motor - No focal deficits                    LEFT    UE - ShAB 5/5, EF 5/5, EE 5/5, WE 5/5,  5/5                    RIGHT UE - ShAB 5/5, EF 5/5, EE 5/5, WE 5/5,  5/5                    LEFT    LE - HF 5/5, KE 5/5, DF 5/5, PF 5/5, EHL 5/5                    RIGHT LE - HF 5/5, KE 5/5, DF 5/5, PF 5/5, EHL 5/5     Sensory - Intact to LT B/L     Reflexes - DTR Intact, No primitive reflexes     Coordination - FTN mildly impaired left > right; HTS intact B/L     Tone - normal  Psychiatric - Mood stable, Affect WNL  Skin:  all skin intact  Wounds: None Present

## 2018-09-10 NOTE — PROGRESS NOTE ADULT - SUBJECTIVE AND OBJECTIVE BOX
THE PATIENT WAS SEEN AND EXAMINED BY ME WITH THE HOUSESTAFF AND STROKE TEAM DURING MORNING ROUNDS.     HPI: 84yo black man with a PMHx of HTN presented to [facility] as a transfer from [facility] for basilar/vert occlusion. Patient presented acutely and severely dysarthric with right sided weakness, left forced gaze. Patient accompanied by daughter who reported patient is fully functional at baseline. In the ED, CTH NAD. CTA shows distal flow past proximal basilar/vertebral occlusion. On admission: NIHSS 8 MRS 0.     SUBJECTIVE: No events overnight.  No new neurologic complaints.      amLODIPine   Tablet 2.5 milliGRAM(s) Oral daily  aspirin 325 milliGRAM(s) Oral daily  atorvastatin 80 milliGRAM(s) Oral at bedtime  clopidogrel Tablet 75 milliGRAM(s) Oral daily  enoxaparin Injectable 40 milliGRAM(s) SubCutaneous daily    PHYSICAL EXAM:   Vital Signs Last 24 Hrs  T(C): 36.7 (10 Sep 2018 04:00), Max: 36.9 (09 Sep 2018 20:00)  T(F): 98 (10 Sep 2018 04:00), Max: 98.4 (09 Sep 2018 20:00)  HR: 60 (10 Sep 2018 06:00) (57 - 80)  BP: 112/73 (10 Sep 2018 06:00) (110/91 - 135/76)  BP(mean): 85 (10 Sep 2018 06:00) (84 - 115)  RR: 13 (10 Sep 2018 06:00) (11 - 17)  SpO2: 98% (10 Sep 2018 06:00) (92% - 98%)    General: No acute distress  HEENT: EOM intact, visual fields full  Abdomen: Soft, nontender, nondistended   Extremities: No edema    NEUROLOGICAL EXAM:  Mental status: Awake, alert, oriented to age, hospital, not time, no neglect, follows commands   Cranial Nerves: No facial asymmetry, no nystagmus, mild dysarthria,  tongue midline  Motor exam: 5-/5 RUE, 5/5 RLE, 5/5 LUE, 5/5 LLE  Sensation: Intact to light touch   Coordination/ Gait: ataxia R on finger-to-nose, gait not assessed    LABS:                        13.2   5.8   )-----------( 149      ( 10 Sep 2018 05:18 )             39.1    09-10    134<L>  |  102  |  18  ----------------------------<  91  3.6   |  21<L>  |  1.16    Ca    8.6      10 Sep 2018 05:18    Hemoglobin A1C, Whole Blood: 5.6 % (09-04 @ 07:10)  Hemoglobin A1C, Whole Blood: 5.6 % (09-04 @ 07:10)    IMAGING: Reviewed by me.     MRI BRAIN (09.04.18): Acute bilateral inferior cerebellar hemisphere infarcts. No MR   evidence for hemorrhagic transformation.    MRA HEAD (09.04.18): Nondiagnostic study due to patient motion.    MRA NECK (09.04.18): Limited by motion. Grossly preserved flow-related signal within   the bilateral common and internal carotid arteries, and the bilateral   vertebral arteries.    CT HEAD (09.03.18): Evolving bilateral cerebellar hemisphere infarcts, without hemorrhagic   transformation.

## 2018-09-10 NOTE — PROGRESS NOTE ADULT - PROVIDER SPECIALTY LIST ADULT
Cardiology
Neurology

## 2018-09-10 NOTE — H&P ADULT - NSHPSOCIALHISTORY_GEN_ALL_CORE
SOCIAL HISTORY  Smoking - Denied  EtOH - Denied   Drugs - Denied    FUNCTIONAL HISTORY  Lives in a private home with daughter and has 3 stairs to enter and 12-13 stairs inside to access bed/bath.   Prior Level of Function: Independent in ADLs and ambulation. No assistive devices PTA.    CURRENT FUNCTIONAL STATUS  Bed mobility: contact guard/min assist  Transfers: min assist x 2 person assist  Ambulation: 25 ft with RW and moderate assist  ADLs: LE dress supervision SOCIAL HISTORY  Smoking - Denied  EtOH - Denied   Drugs - Denied    FUNCTIONAL HISTORY  Lives in a private home with son/daughter and has 3 stairs to enter and 12-13 stairs inside to access bed/bath.   Prior Level of Function: Independent in ADLs and ambulation. No assistive devices PTA.    CURRENT FUNCTIONAL STATUS  Bed mobility: min assist  Transfers: min assist x 2  Ambulation: 25 ft with RW mod assist  ADLs: LE dress supervision

## 2018-09-10 NOTE — H&P ADULT - ASSESSMENT
ASSESSMENT/PLAN  MELONY VITAL is a 82yo Male with   , now with decreased functional mobility, gait instability and ADL impairments.  dysphagia, aphasia, hemiplegia    COMORBIDITES/ACTIVE MEDICAL ISSUES     Gait Instability, ADL impairments and Functional impairments: start Comprehensive Rehab Program of PT/OT       Pain Mgmt - Tylenol PRN  GI/Bowel Mgmt - Colace, Senna,  Miralax PRN  /Bladder Mgmt - Voiding independently, PVR      Precautions / PROPHYLAXIS:   - Falls, Cardiac, Sternal, Spinal, Seizure   - Ortho: Weight bearing status: WBAT   - Lungs: Aspiration, Incentive Spirometer   - Pressure injury/Skin: Turn Q2hrs while in bed, OOB to Chair, PT/OT    - DVT: Lovenox, SCDs, TEDs     MEDICAL PROGNOSIS: GOOD            REHAB POTENTIAL: GOOD             ESTIMATED DISPOSITION: HOME WITH HOME CARE            ELOS: 10-14 Days   EXPECTED THERAPY:     P.T. 1hr/day       O.T. 1hr/day      S.L.P. 1hr/day     P&O Unnecessary     EXP FREQUENCY: 5 days per 7 day period     PRESCREEN COMPARISION:   I have reviewed the prescreen information and I have found no relevant changes between the preadmission screening and my post admission evaluation     RATIONALE FOR INPATIENT ADMISSION - Patient demonstrates the following: (check all that apply)  [X] Medically appropriate for rehabilitation admission  [X] Has attainable rehab goals with an appropriate initial discharge plan  [X] Has rehabilitation potential (expected to make a significant improvement within a reasonable period of time)   [X] Requires close medical management by a rehab physician, rehab nursing care, Hospitalist and comprehensive interdisciplinary team (including PT, OT, & or SLP, Prosthetics and Orthotics) 83 year-old with a PMH of HTN admitted for bilateral cerebellar ischemic CVA now with speech & swallow impairments, gait instability, ADL impairments and functional impairments.    #bilateral cerebellar ischemic CVA  -secondary ppx: ASA 325mg, clopidogrel 75mg QD, stroke education  -start comprehensive rehab program of PT/OT/SLP    #HTN - controlled  -amplodipine 2.5mg QD    #Hyponatremia – likely hypotonic hypovolemic 2/2 decreased PO intake.  -9/10 Na 134 <-137  -encourage fluid intake    #FEN – passed dysphagia screen, tolerating regular diet at OSH  - Diet – Regular, no pork or shellfish    #DVT ppx  -enoxaparin 40mg SQ QD  -SCD’s    Precautions / PROPHYLAXIS:   - Falls, Cardiac, Sternal, Spinal, Seizure   - ortho: Weight bearing status: WBAT   - Lungs: Aspiration, Incentive Spirometer   - Pressure injury/Skin: Turn Q2hrs while in bed, OOB to Chair, PT/OT        MEDICAL PROGNOSIS: GOOD            REHAB POTENTIAL: GOOD             ESTIMATED DISPOSITION: HOME WITH HOME CARE            ELOS: 10-14 Days   EXPECTED THERAPY:     P.T. 1hr/day       O.T. 1hr/day      S.L.P. 1hr/day     P&O Unnecessary     EXP FREQUENCY: 5 days per 7 day period     PRESCREEN COMPARISON   I have reviewed the prescreen information and I have found no relevant changes between the preadmission screening and my post admission evaluation     RATIONALE FOR INPATIENT ADMISSION - Patient demonstrates the following: (check all that apply)  [X] Medically appropriate for rehabilitation admission  [X] Has attainable rehab goals with an appropriate initial discharge plan  [X] Has rehabilitation potential (expected to make a significant improvement within a reasonable period of time)   [X] Requires close medical management by a rehab physician, rehab nursing care, Hospitalist and comprehensive interdisciplinary team (including PT, OT, & or SLP, Prosthetics and Orthotics) 83 year-old with a PMH of HTN admitted for bilateral cerebellar CVA now with gait instability, ADL impairments and functional impairments.    #bilateral cerebellar ischemic CVA  -secondary ppx: ASA 325mg, clopidogrel 75mg QD, stroke education  -start comprehensive rehab program of PT/OT/SLP    #HTN - controlled  -amlodipine 2.5mg QD with hold parameters    #Hyponatremia - mild  -9/10 Na 134 (dec from 137)  -monitor, check BMP; consider urine workup if continues to downtrend    #FEN - passed dysphagia screen, tolerating regular diet at OSH, no pork or shellfish    #DVT ppx - Lovenox SQ QD  -SCD’s    Precautions / PROPHYLAXIS:   - Falls   - ortho: Weight bearing status: WBAT   - Lungs: Aspiration  - Pressure injury/Skin: Turn Q2hrs while in bed, OOB to Chair, PT/OT        MEDICAL PROGNOSIS: GOOD            REHAB POTENTIAL: GOOD             ESTIMATED DISPOSITION: HOME WITH HOME CARE            ELOS: 10-14 Days   EXPECTED THERAPY:     P.T. 1hr/day       O.T. 1hr/day      S.L.P. 1hr/day     P&O Unnecessary     EXP FREQUENCY: 5 days per 7 day period     PRESCREEN COMPARISON   I have reviewed the prescreen information and I have found no relevant changes between the preadmission screening and my post admission evaluation     RATIONALE FOR INPATIENT ADMISSION - Patient demonstrates the following: (check all that apply)  [X] Medically appropriate for rehabilitation admission  [X] Has attainable rehab goals with an appropriate initial discharge plan  [X] Has rehabilitation potential (expected to make a significant improvement within a reasonable period of time)   [X] Requires close medical management by a rehab physician, rehab nursing care, Hospitalist and comprehensive interdisciplinary team (including PT, OT, & or SLP, Prosthetics and Orthotics)    Case d/w Dr. Bear

## 2018-09-10 NOTE — H&P ADULT - NSHPREVIEWOFSYSTEMS_GEN_ALL_CORE
REVIEW OF SYSTEMS  Constitutional - Denies fevers, chills  HEENT - Denies changes in vision or hearing  Respiratory - Denies cough, dyspnea  Cardiovascular - Denies chest pain, palpitations  Gastrointestinal - Denies n/v, constipation, bowel incontinence; last BM 9/10  Genitourinary - Denies dysuria, urinary incontinence  Neurological - Denies weakness, numbness, headaches  Skin - Denies rashes  Musculoskeletal - Denies arthralgia, myalgias, back pain  Psychiatric - Denies depressed mood, anxiety

## 2018-09-10 NOTE — PROGRESS NOTE ADULT - SUBJECTIVE AND OBJECTIVE BOX
Cardiovascular Disease Progress Note  Covering for Dr. Rodriges    Overnight events: No acute events overnight. Mr. Funez denies chest pain or SOB    Otherwise review of systems negative    Objective Findings:  T(C): 36.7 (09-10-18 @ 04:00), Max: 36.9 (09-09-18 @ 10:00)  HR: 60 (09-10-18 @ 06:00) (57 - 80)  BP: 112/73 (09-10-18 @ 06:00) (110/91 - 145/82)  RR: 13 (09-10-18 @ 06:00) (11 - 17)  SpO2: 98% (09-10-18 @ 06:00) (92% - 98%)  Wt(kg): --  Daily     Daily       Physical Exam:  Gen: NAD  HEENT: EOMI  CV: RRR, normal S1 + S2, no m/r/g  Lungs: CTAB  Abd: soft, non-tender  Ext: No edema    Telemetry: Sinus    Laboratory Data:                        13.2   5.8   )-----------( 149      ( 10 Sep 2018 05:18 )             39.1     09-10    134<L>  |  102  |  18  ----------------------------<  91  3.6   |  21<L>  |  1.16    Ca    8.6      10 Sep 2018 05:18                Inpatient Medications:  MEDICATIONS  (STANDING):  amLODIPine   Tablet 2.5 milliGRAM(s) Oral daily  aspirin 325 milliGRAM(s) Oral daily  atorvastatin 80 milliGRAM(s) Oral at bedtime  clopidogrel Tablet 75 milliGRAM(s) Oral daily  enoxaparin Injectable 40 milliGRAM(s) SubCutaneous daily      Assessment: 54 yo male admitted as  transfer from [facility] for basilar/vert occlusion.  CTA shows distal flow past proximal basilar/vertebral occlusion     Problem/Plan - 1:  ·  Problem: Acute CVA (cerebral vascular accident).    Plan: Monitor on Tele- no atrial ectopy noted thus far  On DAPT.   TTE reviewed- mildly dilated LA; otherwise no significant structural heart disease.     Problem/Plan - 2:  ·  Problem: Basilar artery occlusion.  Plan: on DAPT.       Over 25 minutes spent on total encounter; more than 50% of the visit was spent counseling and/or coordinating care by the attending physician.      Harvey Luther MD formerly [facility]  Cardiovascular Disease  [phone]

## 2018-09-10 NOTE — H&P ADULT - ATTENDING COMMENTS
Pt. seen 9/11 AM.  Agree with documentation above as per resident with amendments made as appropriate. Patient medically stable.     Pt. denies any complaints.  no pain, sleeping well.  No headaches or dizziness.  VS & Labs reviewed.   BP elevated this AM 180s in early AM.  later came down to .  Increased amlodipine.    Labs stable--wnl    Vital Signs Last 24 Hrs  T(C): 36.9 (11 Sep 2018 07:54), Max: 36.9 (11 Sep 2018 07:54)  T(F): 98.4 (11 Sep 2018 07:54), Max: 98.4 (11 Sep 2018 07:54)  HR: 70 (11 Sep 2018 11:58) (60 - 88)  BP: 144/88 (11 Sep 2018 11:58) (117/84 - 184/94)  BP(mean): 87 (10 Sep 2018 16:00) (87 - 95)  RR: 15 (11 Sep 2018 07:54) (13 - 17)  SpO2: 99% (11 Sep 2018 07:54) (97% - 99%)                          13.3   5.5   )-----------( 148      ( 11 Sep 2018 05:45 )             39.4   09-11    141  |  107  |  14  ----------------------------<  94  3.7   |  29  |  1.04    Ca    8.7      11 Sep 2018 05:45    TPro  6.9  /  Alb  3.0<L>  /  TBili  0.5  /  DBili  x   /  AST  26  /  ALT  34  /  AlkPhos  68  09-11      82 yo M. 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 asa, plavix, and start comprehensive PT/OT and speech program.     HTN: increased amlodipine to 5mg.  cont. to monitor.  SBP goal 120-150s.      DVT ppx: lovenox Pt. seen 9/11 AM.  Agree with documentation above as per resident with amendments made as appropriate. Patient medically stable.     Pt. denies any complaints.  no pain, sleeping well.  No headaches or dizziness.  VS & Labs reviewed.   BP elevated this AM 180s in early AM.  later came down to .  Increased amlodipine.    Labs stable--wnl    Vital Signs Last 24 Hrs  T(C): 36.9 (11 Sep 2018 07:54), Max: 36.9 (11 Sep 2018 07:54)  T(F): 98.4 (11 Sep 2018 07:54), Max: 98.4 (11 Sep 2018 07:54)  HR: 70 (11 Sep 2018 11:58) (60 - 88)  BP: 144/88 (11 Sep 2018 11:58) (117/84 - 184/94)  BP(mean): 87 (10 Sep 2018 16:00) (87 - 95)  RR: 15 (11 Sep 2018 07:54) (13 - 17)  SpO2: 99% (11 Sep 2018 07:54) (97% - 99%)                          13.3   5.5   )-----------( 148      ( 11 Sep 2018 05:45 )             39.4   09-11    141  |  107  |  14  ----------------------------<  94  3.7   |  29  |  1.04    Ca    8.7      11 Sep 2018 05:45    TPro  6.9  /  Alb  3.0<L>  /  TBili  0.5  /  DBili  x   /  AST  26  /  ALT  34  /  AlkPhos  68  09-11      84 yo M. 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 asa, plavix, and start comprehensive PT/OT and speech program.     HTN: increased amlodipine to 5mg.  cont. to monitor.  SBP goal 120-150s.      Dysphagia-d/w speech therapy--change diet to soft with thin liquids.     DVT ppx: lovenox

## 2018-09-10 NOTE — H&P ADULT - NSHPLABSRESULTS_GEN_ALL_CORE
13.2   5.8   )-----------( 149      ( 10 Sep 2018 05:18 )             39.1     09-10    134<L>  |  102  |  18  ----------------------------<  91  3.6   |  21<L>  |  1.16    Ca    8.6      10 Sep 2018 05:18      < from: MR Head w/wo IV Cont (09.04.18 @ 21:27) >  IMPRESSION:  MRI brain: Acute bilateral inferior cerebellar hemisphere infarcts. No MR   evidence for hemorrhagic transformation.  MRA head: Nondiagnostic study due to patient motion.  MRA neck: Limited by motion. Grossly preserved flow-related signal within   the bilateral common and internal carotid arteries, andthe bilateral   vertebral arteries. CBC     13.2   5.8   )-----------( 149      ( 10 Sep 2018 05:18 )             39.1     09-10    134<L>  |  102  |  18  ----------------------------<  91  3.6   |  21<L>  |  1.16    Ca    8.6      10 Sep 2018 05:18      < from: MR Head w/wo IV Cont (09.04.18 @ 21:27) >  IMPRESSION:  MRI brain: Acute bilateral inferior cerebellar hemisphere infarcts. No MR   evidence for hemorrhagic transformation.  MRA head: Nondiagnostic study due to patient motion.  MRA neck: Limited by motion. Grossly preserved flow-related signal within   the bilateral common and internal carotid arteries, andthe bilateral   vertebral arteries.

## 2018-09-10 NOTE — H&P ADULT - HISTORY OF PRESENT ILLNESS
83 year old AA man with multiple vascular risk factors, including age and HTN admitted to [facility] for acute onset of dysarthria 9/3. He was subsequently transferred to [facility] for further management of "CTA findings".   Patient dx with Acute bilateral inferior cerebellar hemisphere infarcts based on MRI.     CT brain on 9/3 did not show any evidence of acute infarct or hemorrhage. 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 MRI brain on 9/4 showed bilateral PICA distribution and left PCA distribution punctate infarcts and severe leukoaraiosis. TTE did not show any obvious structural cardiac source of embolism nor showed any evidence of a PFO.   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 symptomatic intracranial atherosclerosis.  ASA and Plavix for second stroke prevention as per SAMMPRAS. TTE: EF 75%, mild MR, minimal AR, mild diastolic dysfunction (Stage I), cardiac monitoring without any acute events. dysphagia screen - passed, tolerating diet, Diet: Regular 82yo __ hand dominant M with multiple vascular risk factors, including age and HTN admitted to [facility] on 9/3/18 for acute onset of dysarthria. He was subsequently transferred to [facility] for further management of "CTA findings." Patient dx with acute bilateral inferior cerebellar hemisphere infarcts based on MRI.     CT brain on 9/3 did not show any evidence of acute infarct or hemorrhage. 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 MRI brain on 9/4 showed bilateral PICA distribution and left PCA distribution punctate infarcts and severe leukoaraiosis. TTE did not show any obvious structural cardiac source of embolism nor showed any evidence of a PFO. 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 symptomatic intracranial atherosclerosis. ASA and Plavix for second stroke prevention as per SAMMPRAS. TTE: EF 75%, mild MR, minimal AR, mild diastolic dysfunction (Stage I), cardiac monitoring without any acute events. Dysphagia screen - passed, tolerating regular diet.    LDL - 114  HbA1c - 5.6 84yo left hand dominant M with multiple vascular risk factors, including age and HTN admitted to [facility] on 9/3/18 for acute onset of dysarthria. He was subsequently transferred to [facility] for further management of "CTA findings." Patient dx with acute bilateral inferior cerebellar hemisphere infarcts based on MRI.     CT brain on 9/3 did not show any evidence of acute infarct or hemorrhage. 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 MRI brain on 9/4 showed bilateral PICA distribution and left PCA distribution punctate infarcts and severe leukoaraiosis. TTE did not show any obvious structural cardiac source of embolism nor showed any evidence of a PFO. 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 symptomatic intracranial atherosclerosis. ASA and Plavix for second stroke prevention as per SAMMPRAS. TTE: EF 75%, mild MR, minimal AR, mild diastolic dysfunction (Stage I), cardiac monitoring without any acute events. Dysphagia screen - passed, tolerating regular diet. Patient medically optimized for dc to acute rehab 9/10/18.    LDL - 114  HbA1c - 5.6

## 2018-09-11 DIAGNOSIS — Z29.9 ENCOUNTER FOR PROPHYLACTIC MEASURES, UNSPECIFIED: ICD-10-CM

## 2018-09-11 DIAGNOSIS — I10 ESSENTIAL (PRIMARY) HYPERTENSION: ICD-10-CM

## 2018-09-11 DIAGNOSIS — Z74.09 OTHER REDUCED MOBILITY: ICD-10-CM

## 2018-09-11 DIAGNOSIS — I63.8 OTHER CEREBRAL INFARCTION: ICD-10-CM

## 2018-09-11 LAB
ALBUMIN SERPL ELPH-MCNC: 3 G/DL — LOW (ref 3.3–5)
ALP SERPL-CCNC: 68 U/L — SIGNIFICANT CHANGE UP (ref 40–120)
ALT FLD-CCNC: 34 U/L DA — SIGNIFICANT CHANGE UP (ref 10–45)
ANION GAP SERPL CALC-SCNC: 5 MMOL/L — SIGNIFICANT CHANGE UP (ref 5–17)
AST SERPL-CCNC: 26 U/L — SIGNIFICANT CHANGE UP (ref 10–40)
BASOPHILS # BLD AUTO: 0.1 K/UL — SIGNIFICANT CHANGE UP (ref 0–0.2)
BASOPHILS NFR BLD AUTO: 1.5 % — SIGNIFICANT CHANGE UP (ref 0–2)
BILIRUB SERPL-MCNC: 0.5 MG/DL — SIGNIFICANT CHANGE UP (ref 0.2–1.2)
BUN SERPL-MCNC: 14 MG/DL — SIGNIFICANT CHANGE UP (ref 7–23)
CALCIUM SERPL-MCNC: 8.7 MG/DL — SIGNIFICANT CHANGE UP (ref 8.4–10.5)
CHLORIDE SERPL-SCNC: 107 MMOL/L — SIGNIFICANT CHANGE UP (ref 96–108)
CO2 SERPL-SCNC: 29 MMOL/L — SIGNIFICANT CHANGE UP (ref 22–31)
CREAT SERPL-MCNC: 1.04 MG/DL — SIGNIFICANT CHANGE UP (ref 0.5–1.3)
EOSINOPHIL # BLD AUTO: 0.2 K/UL — SIGNIFICANT CHANGE UP (ref 0–0.5)
EOSINOPHIL NFR BLD AUTO: 3.7 % — SIGNIFICANT CHANGE UP (ref 0–6)
GLUCOSE SERPL-MCNC: 94 MG/DL — SIGNIFICANT CHANGE UP (ref 70–99)
HCT VFR BLD CALC: 39.4 % — SIGNIFICANT CHANGE UP (ref 39–50)
HGB BLD-MCNC: 13.3 G/DL — SIGNIFICANT CHANGE UP (ref 13–17)
LYMPHOCYTES # BLD AUTO: 2.8 K/UL — SIGNIFICANT CHANGE UP (ref 1–3.3)
LYMPHOCYTES # BLD AUTO: 51 % — HIGH (ref 13–44)
MCHC RBC-ENTMCNC: 28.6 PG — SIGNIFICANT CHANGE UP (ref 27–34)
MCHC RBC-ENTMCNC: 33.8 GM/DL — SIGNIFICANT CHANGE UP (ref 32–36)
MCV RBC AUTO: 84.7 FL — SIGNIFICANT CHANGE UP (ref 80–100)
MONOCYTES # BLD AUTO: 0.6 K/UL — SIGNIFICANT CHANGE UP (ref 0–0.9)
MONOCYTES NFR BLD AUTO: 11.3 % — HIGH (ref 1–9)
NEUTROPHILS # BLD AUTO: 1.8 K/UL — SIGNIFICANT CHANGE UP (ref 1.8–7.4)
NEUTROPHILS NFR BLD AUTO: 32.5 % — LOW (ref 43–77)
PLATELET # BLD AUTO: 148 K/UL — LOW (ref 150–400)
POTASSIUM SERPL-MCNC: 3.7 MMOL/L — SIGNIFICANT CHANGE UP (ref 3.5–5.3)
POTASSIUM SERPL-SCNC: 3.7 MMOL/L — SIGNIFICANT CHANGE UP (ref 3.5–5.3)
PREALB SERPL-MCNC: 14 MG/DL — LOW (ref 20–40)
PROT SERPL-MCNC: 6.9 G/DL — SIGNIFICANT CHANGE UP (ref 6–8.3)
RBC # BLD: 4.65 M/UL — SIGNIFICANT CHANGE UP (ref 4.2–5.8)
RBC # FLD: 13.3 % — SIGNIFICANT CHANGE UP (ref 10.3–14.5)
SODIUM SERPL-SCNC: 141 MMOL/L — SIGNIFICANT CHANGE UP (ref 135–145)
WBC # BLD: 5.5 K/UL — SIGNIFICANT CHANGE UP (ref 3.8–10.5)
WBC # FLD AUTO: 5.5 K/UL — SIGNIFICANT CHANGE UP (ref 3.8–10.5)

## 2018-09-11 PROCEDURE — 99223 1ST HOSP IP/OBS HIGH 75: CPT

## 2018-09-11 PROCEDURE — 93010 ELECTROCARDIOGRAM REPORT: CPT

## 2018-09-11 RX ORDER — AMLODIPINE BESYLATE 2.5 MG/1
2.5 TABLET ORAL ONCE
Qty: 0 | Refills: 0 | Status: COMPLETED | OUTPATIENT
Start: 2018-09-11 | End: 2018-09-11

## 2018-09-11 RX ORDER — AMLODIPINE BESYLATE 2.5 MG/1
5 TABLET ORAL DAILY
Qty: 0 | Refills: 0 | Status: DISCONTINUED | OUTPATIENT
Start: 2018-09-12 | End: 2018-09-13

## 2018-09-11 RX ADMIN — Medication 325 MILLIGRAM(S): at 10:38

## 2018-09-11 RX ADMIN — ATORVASTATIN CALCIUM 80 MILLIGRAM(S): 80 TABLET, FILM COATED ORAL at 21:01

## 2018-09-11 RX ADMIN — ENOXAPARIN SODIUM 40 MILLIGRAM(S): 100 INJECTION SUBCUTANEOUS at 10:39

## 2018-09-11 RX ADMIN — AMLODIPINE BESYLATE 2.5 MILLIGRAM(S): 2.5 TABLET ORAL at 11:38

## 2018-09-11 RX ADMIN — CLOPIDOGREL BISULFATE 75 MILLIGRAM(S): 75 TABLET, FILM COATED ORAL at 10:39

## 2018-09-11 RX ADMIN — AMLODIPINE BESYLATE 2.5 MILLIGRAM(S): 2.5 TABLET ORAL at 05:34

## 2018-09-11 NOTE — PROGRESS NOTE ADULT - ASSESSMENT
82yo LHD man with PMH of HTN admitted for bilateral cerebellar ischemic CVA with residual functional impairments, ADL impairments, ambulation impairments and speech/swallow impairments.    1. bilateral cerebellar ischemic CVA - likely large vessel etiology  -secondary stroke prevention: continue ASA 325mg QD, clopidogrel 75mg QD, atorvastatin 80mg QD, stroke education  -PT/OT/SLP    2. HTN - uncontrolled, 140-180's/90's since admission  -inc amlodipine from 2.5mg -> 5mg QD  -orthostatics  -continue to monitor    3. Dysphagia - evaluated by SLP 9/11  -change diet to soft with thins    4. Hyponatremia - Na 141 today, resolved  -BMP 2x per week    5. Thrombocytopenia - Plt 148 today, asymptomatic.  -continue to monitor    6. DVT ppx  -continue with enoxaparin 40mg SQ QD

## 2018-09-11 NOTE — CONSULT NOTE ADULT - ASSESSMENT
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 There were no acute events overnight. Today, pt notes persistent dyspnea at rest. Denies fever, chills, n/v, chest pain, pleuritic chest pain, abdominal pain or calf pain.  VS: T 36.4 HR 91 /83 RR 20 SpO2 97% on 6L NC  Gen: resting in NAD  HEENT: EOMI, PERRLA, no headaches, changes in vision,  Chest: CTA b/l; neg W/R/R  Heart: irregular rate, neg M/R/G  Abd: soft, NT, ND; normactive BS  Ext: 4+ pitting edema b/l, compression stockings on  Psych: AOx3  Neuro: no focal deficits  Skin: no rashes  Na 145 K 3.6 Cl 108 HCO3 22 BUN 15 Cr 12 Glucose 108  Hb/ Hct 13.2/ 40.6 WBC 5.74 Plt 206 Pt is a 75 y/o F with hx of HTN, DVT/PE (2/2016) and chronic b/l LE lymphadema who initially presented on 2/25 with chief complaint of exertional dyspnea diagnosed with PE on CTA.  There were no acute events overnight and pt was hemodynamically stable. Today, pt notes persistent dyspnea at rest. Denies fever, chills, n/v, syncope, chest pain, pleuritic chest pain, cough, hemoptysis, abdominal pain or calf pain.  VS: T 36.4 HR 91 /83 RR 20 SpO2 97% on 6L NC  Gen: resting in NAD  HEENT: EOMI, PERRLA, no LAD, no headaches, blurry vision,  Pulm: no increased work of breathing or accessory muscle use; CTA b/l; noW/R/R  CV: irregular rate, nl S1, S2; no M/R/G; no JVD  Abd: soft, NT, ND; normactive BS; no HSM; no ascites  Ext: 4+ pitting edema b/l up to knees, no cyanosis  Psych: AOx3  Neuro: no FND; CN 2-12 grossly intact; strength 5/5 in all extremities; sensation intact  Skin: no rashes, ulcerations, or blisters  Na 145 K 3.6 Cl 108 HCO3 22 BUN 12 Cr 0.66 Glucose 108  Hb/ Hct 13.2/ 40.6 WBC 5.74 Plt 206 Pt is a 75 y/o F with chief complaint of exertional dyspnea diagnosed with PE on CTA.  There were no acute events overnight and pt was hemodynamically stable. Today, pt notes persistent dyspnea at rest. Denies fever, chills, n/v, syncope, chest pain, pleuritic chest pain, cough, hemoptysis, abdominal pain or calf pain.  VS: T 36.4 HR 91 /83 RR 20 SpO2 97% on 6L NC  Gen: resting in NAD  HEENT: EOMI, PERRLA, no LAD, no headaches, blurry vision,  Pulm: no increased work of breathing or accessory muscle use; CTA b/l; noW/R/R  CV: irregular rate, nl S1, S2; no M/R/G; no JVD  Abd: soft, NT, ND; normactive BS; no HSM; no ascites  Ext: 4+ pitting edema b/l up to knees, no cyanosis  Psych: AOx3  Neuro: no FND; CN 2-12 grossly intact; strength 5/5 in all extremities; sensation intact  Skin: no rashes, ulcerations, or blisters  Na 145 K 3.6 Cl 108 HCO3 22 BUN 12 Cr 0.66 Glucose 108  Hb/ Hct 13.2/ 40.6 WBC 5.74 Plt 206 82 y/o M with PMH HTN now acute acute CVA with functional deficit, impaired ADL's

## 2018-09-11 NOTE — PROGRESS NOTE ADULT - SUBJECTIVE AND OBJECTIVE BOX
S:  Interval history:  No acute events overnight. Patient says he is doing well this morning, no complaints. Last remembers have truncal imbalance, leaning to the L at home. Does not know circumstances that brought him into the hospital, this was discussed with patient. He denies pain. Urinating appropriately. Last BM 9/9. Slept well last night. Denies HA, dizziness, light-headedness, imbalance, visual changes, and N/V.     O:  VS: T 98.4, HR 60-80's, -180's/90's, RR 15, O2sat 99% on RA  PE:  Gen: well-appearing, sitting upright in wheelchair  Neuro: AO to person, place, month, and day, but not year, date or situation. CN: R pupil round, reactive, L pupil minimally reactive. EOMI. V1-V3 sensation intact to LT. Face symmetric. Tongue midline. Accessory muscle intact. Motor: UE and LE 5/5 throughout. Sensory: Grossly intact to LT throughout. Reflexes: 2+ biceps, patellar 1+, achilles 1+.   CV: RRR, S1, S2, no rubs, murmurs, gallops  Resp: breathing comfortably, CTA b/l  Abd: soft, NT/ND, normoactive BS throughout  Ext: no LE edema or tenderness to palpation.    Labs:                        13.3   5.5   )-----------( 148      ( 11 Sep 2018 05:45 )             39.4     09-11    141  |  107  |  14  ----------------------------<  94  3.7   |  29  |  1.04    Ca    8.7      11 Sep 2018 05:45    TPro  6.9  /  Alb  3.0<L>  /  TBili  0.5  /  DBili  x   /  AST  26  /  ALT  34  /  AlkPhos  68  09-11      EKG: sinus bradycardia, QTc 437

## 2018-09-11 NOTE — CONSULT NOTE ADULT - PROBLEM SELECTOR RECOMMENDATION 2
uncontrolled, consistently over 140 since admission   will increase Norvasc to 5 mg for now and monitor

## 2018-09-11 NOTE — CONSULT NOTE ADULT - SUBJECTIVE AND OBJECTIVE BOX
84yo left hand dominant M with multiple vascular risk factors, including age and HTN admitted to [facility] on 9/3/18 for acute onset of dysarthria. He was subsequently transferred to [facility] for further management of "CTA findings." Patient dx with acute bilateral inferior cerebellar hemisphere infarcts based on MRI.     CT brain on 9/3 did not show any evidence of acute infarct or hemorrhage. 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 MRI brain on 9/4 showed bilateral PICA distribution and left PCA distribution punctate infarcts and severe leukoaraiosis. TTE did not show any obvious structural cardiac source of embolism nor showed any evidence of a PFO. 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 symptomatic intracranial atherosclerosis. ASA and Plavix for second stroke prevention as per SAMMPRAS. TTE: EF 75%, mild MR, minimal AR, mild diastolic dysfunction (Stage I), cardiac monitoring without any acute events. Dysphagia screen - passed, tolerating regular diet. Patient medically optimized for dc to acute rehab 9/10/18.    LDL - 114  HbA1c - 5.6 (10 Sep 2018 15:43)      PAST MEDICAL & SURGICAL HISTORY:  HTN (hypertension)  No significant past surgical history    Family hx reviewed and non contributory         Substance Use (street drugs): (  ) never used  (  ) other:  Tobacco Usage:  (  x ) never smoked   (   ) former smoker   (   ) current smoker  (     ) pack year  Alcohol Usage:  Sexual History:   Recent Travel:      Allergies    No Known Allergies    Intolerances  MEDICATIONS  (STANDING):  amLODIPine   Tablet 2.5 milliGRAM(s) Oral daily  aspirin 325 milliGRAM(s) Oral daily  atorvastatin 80 milliGRAM(s) Oral at bedtime  clopidogrel Tablet 75 milliGRAM(s) Oral daily  enoxaparin Injectable 40 milliGRAM(s) SubCutaneous daily    MEDICATIONS  (PRN):      REVIEW OF SYSTEMS:  CONSTITUTIONAL: No fever, weight loss, or fatigue  RESPIRATORY: No cough, wheezing, chills or hemoptysis; No shortness of breath  CARDIOVASCULAR: No chest pain, palpitations, dizziness, or leg swelling  GASTROINTESTINAL: No abdominal or epigastric pain. No nausea, vomiting, or hematemesis; No diarrhea or constipation. No melena or hematochezia.  GENITOURINARY: No dysuria, frequency, hematuria, or incontinence  MUSCULOSKELETAL: No joint pain or swelling; No muscle, back, or extremity pain      ALL ROS REVIEWED AND NORMAL EXCEPT AS STATED ABOVE    Vital Signs Last 24 Hrs  T(C): 36.9 (11 Sep 2018 07:54), Max: 36.9 (11 Sep 2018 07:54)  T(F): 98.4 (11 Sep 2018 07:54), Max: 98.4 (11 Sep 2018 07:54)  HR: 60 (11 Sep 2018 07:54) (60 - 88)  BP: 184/94 (11 Sep 2018 07:54) (96/78 - 184/94)  BP(mean): 87 (10 Sep 2018 16:00) (86 - 95)  RR: 15 (11 Sep 2018 07:54) (13 - 25)  SpO2: 99% (11 Sep 2018 07:54) (94% - 99%)    PHYSICAL EXAM:  GENERAL: NAD  HEAD:  Atraumatic, Normocephalic  NERVOUS SYSTEM:  Alert & Oriented X3  CHEST/LUNG: Clear lungs bilaterally; No rales, rhonchi, wheezing, or rubs  HEART: S1S2, RRR   ABDOMEN: Soft, Nontender, Nondistended; + Bowel sounds  EXTREMITIES:  2+ Peripheral Pulses, No clubbing, cyanosis, or edema          LABS:                        13.3   5.5   )-----------( 148      ( 11 Sep 2018 05:45 )             39.4     09-11    141  |  107  |  14  ----------------------------<  94  3.7   |  29  |  1.04    Ca    8.7      11 Sep 2018 05:45    TPro  6.9  /  Alb  3.0<L>  /  TBili  0.5  /  DBili  x   /  AST  26  /  ALT  34  /  AlkPhos  68  09-11         CAPILLARY BLOOD GLUCOSE                RADIOLOGY & ADDITIONAL TESTS:    Consultant(s) Notes Reviewed:  [x ] YES  [ ] NO  Care Discussed with Consultants/Other Providers [ x] YES  [ ] NO  Imaging Personally Reviewed:  [ ] YES  [ ] NO

## 2018-09-12 PROCEDURE — 99233 SBSQ HOSP IP/OBS HIGH 50: CPT

## 2018-09-12 PROCEDURE — 90832 PSYTX W PT 30 MINUTES: CPT

## 2018-09-12 PROCEDURE — 99232 SBSQ HOSP IP/OBS MODERATE 35: CPT

## 2018-09-12 RX ORDER — AMLODIPINE BESYLATE 2.5 MG/1
5 TABLET ORAL ONCE
Qty: 0 | Refills: 0 | Status: COMPLETED | OUTPATIENT
Start: 2018-09-12 | End: 2018-09-12

## 2018-09-12 RX ORDER — HYDROCHLOROTHIAZIDE 25 MG
12.5 TABLET ORAL DAILY
Qty: 0 | Refills: 0 | Status: DISCONTINUED | OUTPATIENT
Start: 2018-09-12 | End: 2018-09-14

## 2018-09-12 RX ADMIN — CLOPIDOGREL BISULFATE 75 MILLIGRAM(S): 75 TABLET, FILM COATED ORAL at 12:13

## 2018-09-12 RX ADMIN — AMLODIPINE BESYLATE 5 MILLIGRAM(S): 2.5 TABLET ORAL at 06:00

## 2018-09-12 RX ADMIN — Medication 12.5 MILLIGRAM(S): at 10:04

## 2018-09-12 RX ADMIN — ENOXAPARIN SODIUM 40 MILLIGRAM(S): 100 INJECTION SUBCUTANEOUS at 12:12

## 2018-09-12 RX ADMIN — Medication 325 MILLIGRAM(S): at 12:12

## 2018-09-12 RX ADMIN — AMLODIPINE BESYLATE 5 MILLIGRAM(S): 2.5 TABLET ORAL at 20:23

## 2018-09-12 RX ADMIN — ATORVASTATIN CALCIUM 80 MILLIGRAM(S): 80 TABLET, FILM COATED ORAL at 20:23

## 2018-09-12 NOTE — PROGRESS NOTE ADULT - SUBJECTIVE AND OBJECTIVE BOX
S:  Interval history:  No acute events overnight. Patient has no complaints, everything is good. He denies pain. Urinating appropriately. Last BM 9/9. Slept well last night. Denies HA, dizziness, light-headedness, and N/V.     O:  VS: T 98, HR 50-80's, -170's/70-90's, RR 14, O2sat 98% on RA  PE:  Gen: well-appearing, sitting upright in wheelchair  Neuro: Alert, oriented to person  CV: RRR, S1, S2, no rubs, murmurs, gallops  Resp: breathing comfortably, CTA b/l  Abd: soft, NT/ND, normoactive BS throughout  Ext: no LE edema or tenderness to palpation.    Labs:  Prealbumin 14 S:  Interval history:  No acute events overnight. Patient has no complaints, everything is good. He denies pain. Urinating appropriately. Last BM 9/9. Slept well last night. Denies HA, dizziness, light-headedness, and N/V.     O:  VS: T 98, HR 50-80's, -170's/70-90's, RR 14, O2sat 98% on RA  PE:  Gen: well-appearing, sitting upright in wheelchair  Neuro: Alert, oriented to person, place, year, month, not date (said 11th) or day (said Thurs). Understand he had stroke. Does not fully understand circumstances that brought him to rehab. Significantly improved from yesterday (AOx1)  CV: RRR, S1, S2, no rubs, murmurs, gallops  Resp: breathing comfortably, CTA b/l  Abd: soft, NT/ND, normoactive BS throughout  Ext: no LE edema or tenderness to palpation.    Labs:  Prealbumin 14 S:  Interval history:  No acute events overnight. Patient has no complaints, everything is good. He denies pain. Urinating appropriately. Last BM 9/11. Slept well last night. Denies HA, dizziness, light-headedness, and N/V.     O:  VS: T 98, HR 50-80's, -170's/70-90's, RR 14, O2sat 98% on RA  PE:  Gen: well-appearing, sitting upright in wheelchair  Neuro: Alert, oriented to person, place, year, month, not date (said 11th) or day (said Thurs). Understand he had stroke. Does not fully understand circumstances that brought him to rehab. Significantly improved from yesterday (AOx1)  CV: RRR, S1, S2, no rubs, murmurs, gallops  Resp: breathing comfortably, CTA b/l  Abd: soft, NT/ND, normoactive BS throughout  Ext: no LE edema or tenderness to palpation.    Labs:  Prealbumin 14

## 2018-09-12 NOTE — DIETITIAN INITIAL EVALUATION ADULT. - SOURCE
patient/82yo left hand dominant M with multiple vascular risk factors, including age and HTN admitted to [facility] on 9/3/18 for acute onset of dysarthria. He was subsequently transferred to [facility] for further management of "CTA findings." Patient dx with acute bilateral inferior cerebellar hemisphere infarcts based on MRI.

## 2018-09-12 NOTE — PROGRESS NOTE ADULT - ASSESSMENT
84yo LHD man with PMH of HTN admitted for bilateral cerebellar ischemic CVA with residual functional impairments, ADL impairments, ambulation impairments and speech/swallow impairments.    1. bilateral cerebellar ischemic CVA - likely large vessel etiology  -secondary stroke prevention: continue ASA 325mg QD, clopidogrel 75mg QD, atorvastatin 80mg QD, stroke education  -PT/OT/SLP    2. HTN - uncontrolled, 140-170's/70-90's overnight. Not orthostatic. On amlodipine 5mg QD.  -Hospitalist following - rec's appreciated  -start HCTZ 12.5mg QD per hospitalist  -c/w amlodipine 5mg QD  -continue to monitor    3. Dysphagia - evaluated by SLP 9/11  -tolerating diet  -continue with soft with thins    4. Hyponatremia - 9/11 Na 141. Resolved.  -regular labs  -BMP 9/13    5. Thrombocytopenia - 9/11 Plt 148. Asymptomatic.  -regular labs  -CBC 9/13    6. DVT ppx  -c/w enoxaparin 40mg SQ QD

## 2018-09-12 NOTE — PROGRESS NOTE ADULT - SUBJECTIVE AND OBJECTIVE BOX
MELONY VITAL  83y  Male    Patient is a 83y old  Male who presents with a chief complaint of CVA (11 Sep 2018 11:43)      pt seen and examined, feels well, no complaints, hypertensive this morning    PAST MEDICAL & SURGICAL HISTORY:  HTN (hypertension)  No significant past surgical history        MedsMEDICATIONS  (STANDING):  amLODIPine   Tablet 5 milliGRAM(s) Oral daily  aspirin 325 milliGRAM(s) Oral daily  atorvastatin 80 milliGRAM(s) Oral at bedtime  clopidogrel Tablet 75 milliGRAM(s) Oral daily  enoxaparin Injectable 40 milliGRAM(s) SubCutaneous daily  hydrochlorothiazide 12.5 milliGRAM(s) Oral daily    MEDICATIONS  (PRN):      Vital Signs Last 24 Hrs  T(C): 36.7 (12 Sep 2018 07:12), Max: 37 (11 Sep 2018 20:49)  T(F): 98 (12 Sep 2018 07:12), Max: 98.6 (11 Sep 2018 20:49)  HR: 56 (12 Sep 2018 07:12) (56 - 86)  BP: 145/78 (12 Sep 2018 10:23) (144/88 - 170/90)  BP(mean): --  RR: 14 (12 Sep 2018 07:12) (14 - 14)  SpO2: 98% (12 Sep 2018 07:12) (96% - 98%)    PHYSICAL EXAM:  GENERAL: NAD  HEAD:  NC/AT  EYES: EOMI, PERRLA, conjunctiva and sclera clear  NERVOUS SYSTEM:  Alert & Oriented X3  CHEST/LUNG: Clear lungs b/l  HEART: S1S2, RRR   ABDOMEN: Soft, non-tender, non-distended, + bowel sounds  EXTREMITIES:  2+ Peripheral Pulses, No clubbing, cyanosis, or edema      LABS:                          13.3   5.5   )-----------( 148      ( 11 Sep 2018 05:45 )             39.4       09-11    141  |  107  |  14  ----------------------------<  94  3.7   |  29  |  1.04    Ca    8.7      11 Sep 2018 05:45    TPro  6.9  /  Alb  3.0<L>  /  TBili  0.5  /  DBili  x   /  AST  26  /  ALT  34  /  AlkPhos  68  09-11                                    RADIOLOGY & ADDITIONAL TESTS:    Imaging Personally Reviewed:  [ ] YES  [ ] NO      HEALTH ISSUES - PROBLEM Dx:  Prophylactic measure: Prophylactic measure  Impaired mobility and ADLs: Impaired mobility and ADLs  Essential hypertension: Essential hypertension  Cerebrovascular accident (CVA) due to other mechanism: Cerebrovascular accident (CVA) due to other mechanism          Care Discussed with Consultants/Other Providers [ x] YES  [ ] NO

## 2018-09-12 NOTE — PROGRESS NOTE ADULT - SUBJECTIVE AND OBJECTIVE BOX
HISTORY OF PRESENT ILLNESS  84yo left hand dominant M with multiple vascular risk factors, including age and HTN admitted to [facility] on 9/3/18 for acute onset of dysarthria. He was subsequently transferred to [facility] for further management of "CTA findings." Patient dx with acute bilateral inferior cerebellar hemisphere infarcts based on MRI.     CT brain on 9/3 did not show any evidence of acute infarct or hemorrhage. 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 MRI brain on 9/4 showed bilateral PICA distribution and left PCA distribution punctate infarcts and severe leukoaraiosis. TTE did not show any obvious structural cardiac source of embolism nor showed any evidence of a PFO. 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 symptomatic intracranial atherosclerosis. ASA and Plavix for second stroke prevention as per SAMRAS. TTE: EF 75%, mild MR, minimal AR, mild diastolic dysfunction (Stage I), cardiac monitoring without any acute events. Dysphagia screen - passed, tolerating regular diet. Patient medically optimized for dc to acute rehab 9/10/18.    TODAY'S SUBJECTIVE & REVIEW OF SYMPTOMS  [X] Constiutional WNL            [  ] Cardio WNL               [X] Resp WNL  [X] GI WNL                            [X]  WNL                    [X] Heme WNL  [X] Endo WNL                       [X] Skin WNL                   [X] MSK WNL  [X] Neuro WNL                     [X] Cognitive WNL            [X] Psych WNL    Patient seen and examined in chair. Reports that he is doing very well and does not have any weakness. Reports that therapy is going well. Reports that he has the urge to urinate often but no burning or pain with urination. Continues to have elevated blood pressure, HCTZ added per medicine recommendations.    VITALS  T(C): 36.7 (09-12-18 @ 07:12)  T(F): 98 (09-12-18 @ 07:12), Max: 98.6 (09-11-18 @ 20:49)  HR: 56 (09-12-18 @ 07:12) (56 - 76)  BP: 145/78 (09-12-18 @ 10:23) (145/76 - 170/90)  RR:  (14 - 14)  SpO2:  (96% - 98%)  Wt(kg): --    PHYSICAL EXAM  Gen - NAD, Comfortable  HEENT - NCAT, EOMI  Neck - Supple, No limited ROM  Pulm - CTAB, No wheeze, No rhonchi, No crackles  Cardiovascular - RRR, S1S2, No murmurs  Abdomen - Soft, NT/ND, +BS  Extremities - No C/C/E, No calf tenderness  Neuro-  	   Cognitive - AAOx3; required cuing for year  	   Communication - Fluent, No dysarthria  	   Cranial Nerves - CN 2-12 intact B/L  	   Motor - No focal deficits  	                  LEFT    UE - ShAB 5/5, EF 5/5, EE 5/5, WE 5/5,  5/5  	                  RIGHT UE - ShAB 5/5, EF 5/5, EE 5/5, WE 5/5,  5/5  	                  LEFT    LE - HF 5/5, KE 5/5, DF 5/5, PF 5/5, EHL 5/5  	                  RIGHT LE - HF 5/5, KE 5/5, DF 5/5, PF 5/5, EHL 5/5  	   Sensory - Intact to LT B/L  	   Tone - normal  Psychiatric - Mood stable, Affect WNL  Skin - all skin intact    RECENT LABS/IMAGING             13.3   5.5   )-----------( 148      ( 11 Sep 2018 05:45 )             39.4     141  |  107  |  14  ----------------------------<  94  3.7   |  29  |  1.04    Ca    8.7      11 Sep 2018 05:45    TPro  6.9  /  Alb  3.0<L>  /  TBili  0.5  /  DBili  x   /  AST  26  /  ALT  34  /  AlkPhos  68  09-11    MEDICATIONS   MEDICATIONS  (STANDING):  amLODIPine   Tablet 5 milliGRAM(s) Oral daily  aspirin 325 milliGRAM(s) Oral daily  atorvastatin 80 milliGRAM(s) Oral at bedtime  clopidogrel Tablet 75 milliGRAM(s) Oral daily  enoxaparin Injectable 40 milliGRAM(s) SubCutaneous daily  hydrochlorothiazide 12.5 milliGRAM(s) Oral daily    ASSESSMENT/PLAN  83 year-old with a PMH of HTN admitted for bilateral cerebellar CVA now with gait instability, ADL impairments and functional impairments.    1. Bilateral cerebellar CVA - Continue 3 hours of comprehensive therapy consisting of PT/OT/SLP. Fall and cardiac precautions    2. Secondary CVA PPx - Continue ASA, Atorvastatin, and Plavix    3. HTN - Continue Norvasc 5mgs, Added HCTZ 12.5mg per medicine recommendations    4. DVT PPx - Lovenox    5. Diet - Soft/thins    6. Skin - Turn and position Q2hrs    7. GI/ management - Toilet PRN

## 2018-09-13 LAB
ANION GAP SERPL CALC-SCNC: 7 MMOL/L — SIGNIFICANT CHANGE UP (ref 5–17)
BUN SERPL-MCNC: 15 MG/DL — SIGNIFICANT CHANGE UP (ref 7–23)
CALCIUM SERPL-MCNC: 9.2 MG/DL — SIGNIFICANT CHANGE UP (ref 8.4–10.5)
CHLORIDE SERPL-SCNC: 106 MMOL/L — SIGNIFICANT CHANGE UP (ref 96–108)
CO2 SERPL-SCNC: 27 MMOL/L — SIGNIFICANT CHANGE UP (ref 22–31)
CREAT SERPL-MCNC: 0.97 MG/DL — SIGNIFICANT CHANGE UP (ref 0.5–1.3)
GLUCOSE SERPL-MCNC: 92 MG/DL — SIGNIFICANT CHANGE UP (ref 70–99)
HCT VFR BLD CALC: 37.5 % — LOW (ref 39–50)
HGB BLD-MCNC: 12.6 G/DL — LOW (ref 13–17)
MCHC RBC-ENTMCNC: 28.9 PG — SIGNIFICANT CHANGE UP (ref 27–34)
MCHC RBC-ENTMCNC: 33.7 GM/DL — SIGNIFICANT CHANGE UP (ref 32–36)
MCV RBC AUTO: 85.9 FL — SIGNIFICANT CHANGE UP (ref 80–100)
PLATELET # BLD AUTO: 152 K/UL — SIGNIFICANT CHANGE UP (ref 150–400)
POTASSIUM SERPL-MCNC: 3.7 MMOL/L — SIGNIFICANT CHANGE UP (ref 3.5–5.3)
POTASSIUM SERPL-SCNC: 3.7 MMOL/L — SIGNIFICANT CHANGE UP (ref 3.5–5.3)
RBC # BLD: 4.36 M/UL — SIGNIFICANT CHANGE UP (ref 4.2–5.8)
RBC # FLD: 13.3 % — SIGNIFICANT CHANGE UP (ref 10.3–14.5)
SODIUM SERPL-SCNC: 140 MMOL/L — SIGNIFICANT CHANGE UP (ref 135–145)
WBC # BLD: 5.4 K/UL — SIGNIFICANT CHANGE UP (ref 3.8–10.5)
WBC # FLD AUTO: 5.4 K/UL — SIGNIFICANT CHANGE UP (ref 3.8–10.5)

## 2018-09-13 PROCEDURE — 99233 SBSQ HOSP IP/OBS HIGH 50: CPT

## 2018-09-13 PROCEDURE — 96116 NUBHVL XM PHYS/QHP 1ST HR: CPT

## 2018-09-13 PROCEDURE — 99232 SBSQ HOSP IP/OBS MODERATE 35: CPT

## 2018-09-13 RX ORDER — AMLODIPINE BESYLATE 2.5 MG/1
10 TABLET ORAL DAILY
Qty: 0 | Refills: 0 | Status: DISCONTINUED | OUTPATIENT
Start: 2018-09-14 | End: 2018-09-14

## 2018-09-13 RX ORDER — AMLODIPINE BESYLATE 2.5 MG/1
5 TABLET ORAL ONCE
Qty: 0 | Refills: 0 | Status: COMPLETED | OUTPATIENT
Start: 2018-09-13 | End: 2018-09-13

## 2018-09-13 RX ORDER — ASPIRIN/CALCIUM CARB/MAGNESIUM 324 MG
81 TABLET ORAL DAILY
Qty: 0 | Refills: 0 | Status: DISCONTINUED | OUTPATIENT
Start: 2018-09-13 | End: 2018-09-19

## 2018-09-13 RX ORDER — DONEPEZIL HYDROCHLORIDE 10 MG/1
5 TABLET, FILM COATED ORAL AT BEDTIME
Qty: 0 | Refills: 0 | Status: DISCONTINUED | OUTPATIENT
Start: 2018-09-13 | End: 2018-09-19

## 2018-09-13 RX ADMIN — DONEPEZIL HYDROCHLORIDE 5 MILLIGRAM(S): 10 TABLET, FILM COATED ORAL at 21:52

## 2018-09-13 RX ADMIN — AMLODIPINE BESYLATE 5 MILLIGRAM(S): 2.5 TABLET ORAL at 06:45

## 2018-09-13 RX ADMIN — Medication 12.5 MILLIGRAM(S): at 06:45

## 2018-09-13 RX ADMIN — ENOXAPARIN SODIUM 40 MILLIGRAM(S): 100 INJECTION SUBCUTANEOUS at 11:36

## 2018-09-13 RX ADMIN — CLOPIDOGREL BISULFATE 75 MILLIGRAM(S): 75 TABLET, FILM COATED ORAL at 11:36

## 2018-09-13 RX ADMIN — ATORVASTATIN CALCIUM 80 MILLIGRAM(S): 80 TABLET, FILM COATED ORAL at 21:52

## 2018-09-13 RX ADMIN — Medication 325 MILLIGRAM(S): at 11:35

## 2018-09-13 RX ADMIN — AMLODIPINE BESYLATE 5 MILLIGRAM(S): 2.5 TABLET ORAL at 21:52

## 2018-09-13 NOTE — PROGRESS NOTE ADULT - SUBJECTIVE AND OBJECTIVE BOX
HISTORY OF PRESENT ILLNESS  84yo left hand dominant M with multiple vascular risk factors, including age and HTN admitted to [facility] on 9/3/18 for acute onset of dysarthria. He was subsequently transferred to [facility] for further management of "CTA findings." Patient dx with acute bilateral inferior cerebellar hemisphere infarcts based on MRI.     CT brain on 9/3 did not show any evidence of acute infarct or hemorrhage. 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 MRI brain on 9/4 showed bilateral PICA distribution and left PCA distribution punctate infarcts and severe leukoaraiosis. TTE did not show any obvious structural cardiac source of embolism nor showed any evidence of a PFO. 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 symptomatic intracranial atherosclerosis. ASA and Plavix for second stroke prevention as per SAMRAS. TTE: EF 75%, mild MR, minimal AR, mild diastolic dysfunction (Stage I), cardiac monitoring without any acute events. Dysphagia screen - passed, tolerating regular diet. Patient medically optimized for dc to acute rehab 9/10/18.    TODAY'S SUBJECTIVE & REVIEW OF SYMPTOMS  [X] Constiutional WNL            [  ] Cardio WNL               [X] Resp WNL  [X] GI WNL                            [X]  WNL                    [X] Heme WNL  [X] Endo WNL                       [X] Skin WNL                   [X] MSK WNL  [  ] Neuro WNL                     [  ] Cognitive WNL            [X] Psych WNL    Patient seen and examined in chair. He continues to have episodes of high blood pressure that improves throughout the day. Amlodipine increased per medicine. Will check orthostatics and adjust as necessary. Per therapy staff has mod/max comprehension impairments - will start Donepezil.    VITALS  T(C): 36.4 (09-13-18 @ 08:13)  T(F): 97.5 (09-13-18 @ 08:13), Max: 98.6 (09-12-18 @ 19:39)  HR: 61 (09-13-18 @ 08:13) (61 - 71)  BP: 176/99 (09-13-18 @ 08:13) (153/76 - 179/89)  RR:  (13 - 14)  SpO2:  (96% - 98%)  Wt(kg): --    PHYSICAL EXAM  Gen - NAD, Comfortable  HEENT - NCAT, EOMI  Neck - Supple, No limited ROM  Pulm - CTAB, No wheeze, No rhonchi, No crackles  Cardiovascular - RRR, S1S2, No murmurs  Abdomen - Soft, NT/ND, +BS  Extremities - No C/C/E, No calf tenderness  Neuro-  	   Cognitive - AAOx3; required cuing for year, Impaired comprehension, recall, carryover, and insight  	   Communication - Fluent, No dysarthria  	   Cranial Nerves - CN 2-12 intact B/L  	   Motor - No focal deficits  	                  LEFT    UE - ShAB 5/5, EF 5/5, EE 5/5, WE 5/5,  5/5  	                  RIGHT UE - ShAB 5/5, EF 5/5, EE 5/5, WE 5/5,  5/5  	                  LEFT    LE - HF 5/5, KE 5/5, DF 5/5, PF 5/5, EHL 5/5  	                  RIGHT LE - HF 5/5, KE 5/5, DF 5/5, PF 5/5, EHL 5/5  	   Sensory - Intact to LT B/L  	   Tone - normal  	   Coordination - FTN mildly impaired left > right; HTS intact B/L  Psychiatric - Mood stable, Affect WNL  Skin - all skin intact    RECENT LABS/IMAGING             12.6   5.4   )-----------( 152      ( 13 Sep 2018 06:15 )             37.5     140  |  106  |  15  ----------------------<  92  3.7   |  27  |  0.97    Ca    9.2      13 Sep 2018 06:15    MEDICATIONS   MEDICATIONS  (STANDING):  amLODIPine   Tablet 5 milliGRAM(s) Oral once  aspirin 325 milliGRAM(s) Oral daily  atorvastatin 80 milliGRAM(s) Oral at bedtime  clopidogrel Tablet 75 milliGRAM(s) Oral daily  donepezil 5 milliGRAM(s) Oral at bedtime  enoxaparin Injectable 40 milliGRAM(s) SubCutaneous daily  hydrochlorothiazide 12.5 milliGRAM(s) Oral daily    ASSESSMENT/PLAN  83 year-old with a PMH of HTN admitted for bilateral cerebellar CVA now with gait instability, ADL impairments and functional impairments.    1. Bilateral cerebellar CVA - Continue 3 hours of comprehensive therapy consisting of PT/OT/SLP. Fall and cardiac precautions    2. Secondary CVA PPx - Continue ASA, Atorvastatin, and Plavix    3. Cognitive impairments (impaired comprehension, recall, carry-over, poor safety) - Start Donepezil 5mg QHS    3. HTN - Continues to have episodes of high blood pressure, Norvasc increased to 10mg per medicine. Will check orthostatics. Continue HCTZ 12.5mg    4. DVT PPx - Lovenox    5. Diet - Soft/thins    6. Skin - Turn and position Q2hrs    7. Disposition - Discussed at interdisciplinary team meeting on 9/13 - tolerating therapy well but limited by poor comprehension, carry-over, and recall. Currently functioning at min assist with ADL's, ambulating 200' with supervision and climbing 12 stairs with supervision, With SLP, currently a Mod/Max assist level. Will need supervision at home upon discharge, will speak with family to discuss future discharge plans. Estimated date of discharge on 9/19/18.    7. GI/ management - Toilet PRN

## 2018-09-13 NOTE — PROGRESS NOTE ADULT - ASSESSMENT
82yo LHD man with PMH of HTN admitted for bilateral cerebellar ischemic CVA with residual functional impairments, ADL impairments, ambulation impairments and speech/swallow impairments.    1. Bilateral cerebellar ischemic CVA - likely large vessel etiology  -secondary stroke prevention: continue ASA 325mg QD, clopidogrel 75mg QD, atorvastatin 80mg QD, stroke education  -PT/OT/SLP  -Functional status (9/13):   -Ambulation - Current: 200ft w/ supervision/contact guard w/o AD. Goal: Mod-I  -Transfers - Current: supervision. Goal: supervision  -ADL's - Current: supervision to min assist. Goal: Mod-I to supervision.     2. HTN - up to 's ON s/p 5mg amlodipine. /74 (electronic, sitting) at 11am. On amlodipine 5mg QD and HCTZ 12.5mg QD.  -Hospitalist following - rec's appreciated  -Per hospitalist inc amlodipine to 10mg QD - hold for now given acceptable pressure.  -Check orthostatics  -c/w amlodipine and HCTZ  -continue to monitor    3. Cognition - mild cognitive impairment  -Start donepezil 5mg QHS    4.ysphagia - evaluated by SLP 9/11  -tolerating diet  -continue with soft with thins    5. Hyponatremia - 9/13 Na 140. Resolved.  -regular labs  -BMP 9/18    6. Thrombocytopenia - 9/13 Plt 152. Resolved  -regular labs  -CBC 9/18    7. DVT ppx  -c/w enoxaparin 40mg SQ QD    8. D/C planning  -IDT rounds today: plan for 9/19 d/c to home with supervision. Barriers of safety awareness requiring supervision. 84yo LHD man with PMH of HTN admitted for bilateral cerebellar ischemic CVA with residual functional impairments, ADL impairments, ambulation impairments and speech/swallow impairments.    1. Bilateral cerebellar ischemic CVA - likely large vessel etiology  -secondary stroke prevention: continue ASA 325mg QD, clopidogrel 75mg QD, atorvastatin 80mg QD, stroke education  -PT/OT/SLP  -Functional status (9/13):   -Ambulation - Current: 200ft w/ supervision/contact guard w/o AD. Goal: Mod-I  -Transfers - Current: supervision. Goal: supervision  -ADL's - Current: supervision to min assist. Goal: Mod-I to supervision.     2. HTN - up to 's ON s/p 5mg amlodipine. /74 (electronic, sitting) at 11am. On amlodipine 5mg QD and HCTZ 12.5mg QD.  -Hospitalist following - rec's appreciated  -Per hospitalist inc amlodipine to 10mg QD - hold for now given acceptable pressure.  -Check orthostatics  -c/w amlodipine and HCTZ  -continue to monitor    3. Cognition - mild cognitive impairment  -Start donepezil 5mg QHS    4. Dysphagia - evaluated by SLP 9/11  -tolerating diet  -continue with soft with thins    5. Hyponatremia - 9/13 Na 140. Resolved.  -regular labs  -BMP 9/18    6. Thrombocytopenia - 9/13 Plt 152. Resolved  -regular labs  -CBC 9/18    7. DVT ppx  -c/w enoxaparin 40mg SQ QD    8. D/C planning  -IDT rounds today: plan for 9/19 d/c to home with supervision. Barriers of safety awareness requiring supervision. 82yo LHD man with PMH of HTN admitted for bilateral cerebellar ischemic CVA with residual functional impairments, ADL impairments, ambulation impairments and speech/swallow impairments.    1. Bilateral cerebellar ischemic CVA - likely large vessel etiology  -secondary stroke prevention: continue ASA 325mg QD, clopidogrel 75mg QD, atorvastatin 80mg QD, stroke education  -PT/OT/SLP  -Functional status (9/13):   -Ambulation - Current: 200ft w/ supervision/contact guard w/o AD. Goal: Mod-I  -Transfers - Current: supervision. Goal: supervision  -ADL's - Current: supervision to min assist. Goal: Mod-I to supervision.     2. HTN - up to 's ON s/p 5mg amlodipine. Per nursing /70's (manual, sitting) at 11:30am. On amlodipine 5mg QD and HCTZ 12.5mg QD.  -Hospitalist following - rec's appreciated  -Per hospitalist inc amlodipine to 10mg QD - hold for now given acceptable pressure.  -Check orthostatics  -c/w amlodipine and HCTZ  -continue to monitor    3. Cognition - mild cognitive impairment  -Start donepezil 5mg QHS    4. Dysphagia - evaluated by SLP 9/11  -tolerating diet  -continue with soft with thins    5. Hyponatremia - 9/13 Na 140. Resolved.  -regular labs  -BMP 9/18    6. Thrombocytopenia - 9/13 Plt 152. Resolved  -regular labs  -CBC 9/18    7. DVT ppx  -c/w enoxaparin 40mg SQ QD    8. D/C planning  -IDT rounds today: plan for 9/19 d/c to home with supervision. Barriers of safety awareness requiring supervision.

## 2018-09-13 NOTE — PROGRESS NOTE ADULT - SUBJECTIVE AND OBJECTIVE BOX
MELONY VITAL  83y  Male    Patient is a 83y old  Male who presents with a chief complaint of CVA (12 Sep 2018 15:30)    Pt seen and examined. No acute overnight events. Pt feels well     PAST MEDICAL & SURGICAL HISTORY:  HTN (hypertension)  No significant past surgical history        MedsMEDICATIONS  (STANDING):  amLODIPine   Tablet 5 milliGRAM(s) Oral daily  aspirin 325 milliGRAM(s) Oral daily  atorvastatin 80 milliGRAM(s) Oral at bedtime  clopidogrel Tablet 75 milliGRAM(s) Oral daily  enoxaparin Injectable 40 milliGRAM(s) SubCutaneous daily  hydrochlorothiazide 12.5 milliGRAM(s) Oral daily    MEDICATIONS  (PRN):      Vital Signs Last 24 Hrs  T(C): 36.4 (13 Sep 2018 08:13), Max: 37 (12 Sep 2018 19:39)  T(F): 97.5 (13 Sep 2018 08:13), Max: 98.6 (12 Sep 2018 19:39)  HR: 61 (13 Sep 2018 08:13) (61 - 71)  BP: 176/99 (13 Sep 2018 08:13) (145/78 - 179/89)  BP(mean): --  RR: 13 (13 Sep 2018 08:13) (13 - 14)  SpO2: 98% (13 Sep 2018 08:13) (96% - 98%)    PHYSICAL EXAM:  GENERAL: NAD  HEAD:  NC/AT  EYES: EOMI, PERRLA, conjunctiva and sclera clear  NERVOUS SYSTEM:  Alert & Oriented X3  CHEST/LUNG: Clear lungs b/l  HEART: S1S2, RRR   ABDOMEN: Soft, non-tender, non-distended, + bowel sounds  EXTREMITIES:  2+ Peripheral Pulses, No clubbing, cyanosis, or edema  SKIN: No rashes or lesions    LABS:                          12.6   5.4   )-----------( 152      ( 13 Sep 2018 06:15 )             37.5       09-13    140  |  106  |  15  ----------------------------<  92  3.7   |  27  |  0.97    Ca    9.2      13 Sep 2018 06:15                                      RADIOLOGY & ADDITIONAL TESTS:    Imaging Personally Reviewed:  [ ] YES  [ ] NO      HEALTH ISSUES - PROBLEM Dx:  Prophylactic measure: Prophylactic measure  Impaired mobility and ADLs: Impaired mobility and ADLs  Essential hypertension: Essential hypertension  Cerebrovascular accident (CVA) due to other mechanism: Cerebrovascular accident (CVA) due to other mechanism          Care Discussed with Consultants/Other Providers [ x] YES  [ ] NO

## 2018-09-13 NOTE — PROGRESS NOTE ADULT - SUBJECTIVE AND OBJECTIVE BOX
S:  Interval history:  Overnight patient SBP to 170's, asymptomatic. Responded to amlodipine PO 5mg.  This morning patient has no complaints, says everything is fine. Sleeping well. Denies pain. Urinating appropriately. Last BM? Denies HA, dizziness, light-headedness, palpitations, and N/V.     O:  T(C): 36.4 (09-13-18 @ 08:13)  T(F): 97.5 (09-13-18 @ 08:13), Max: 98.6 (09-12-18 @ 19:39)  HR: 61 (09-13-18 @ 08:13) (61 - 71)  BP: 176/99 (09-13-18 @ 08:13) (153/76 - 179/89)  RR:  (13 - 14)  SpO2:  (96% - 98%) at RA  PE:  Gen: well-appearing, sitting upright in wheelchair  Neuro: Alert, oriented to person, place, year, and month.  CV: RRR, S1, S2, no rubs, murmurs, gallops  Resp: breathing comfortably, CTA b/l  Abd: soft, NT/ND, normoactive BS throughout  Ext: no LE edema or tenderness to palpation.    Labs:                        12.6   5.4   )-----------( 152      ( 13 Sep 2018 06:15 )             37.5     09-13    140  |  106  |  15  ----------------------------<  92  3.7   |  27  |  0.97    Ca    9.2      13 Sep 2018 06:15

## 2018-09-14 PROCEDURE — 99232 SBSQ HOSP IP/OBS MODERATE 35: CPT

## 2018-09-14 PROCEDURE — 99233 SBSQ HOSP IP/OBS HIGH 50: CPT

## 2018-09-14 RX ORDER — HYDROCHLOROTHIAZIDE 25 MG
25 TABLET ORAL DAILY
Qty: 0 | Refills: 0 | Status: DISCONTINUED | OUTPATIENT
Start: 2018-09-14 | End: 2018-09-14

## 2018-09-14 RX ORDER — AMLODIPINE BESYLATE 2.5 MG/1
10 TABLET ORAL AT BEDTIME
Qty: 0 | Refills: 0 | Status: DISCONTINUED | OUTPATIENT
Start: 2018-09-15 | End: 2018-09-19

## 2018-09-14 RX ORDER — HYDROCHLOROTHIAZIDE 25 MG
12.5 TABLET ORAL
Qty: 0 | Refills: 0 | Status: COMPLETED | OUTPATIENT
Start: 2018-09-14 | End: 2018-09-14

## 2018-09-14 RX ORDER — HYDROCHLOROTHIAZIDE 25 MG
25 TABLET ORAL DAILY
Qty: 0 | Refills: 0 | Status: DISCONTINUED | OUTPATIENT
Start: 2018-09-15 | End: 2018-09-19

## 2018-09-14 RX ORDER — AMLODIPINE BESYLATE 2.5 MG/1
10 TABLET ORAL AT BEDTIME
Qty: 0 | Refills: 0 | Status: DISCONTINUED | OUTPATIENT
Start: 2018-09-14 | End: 2018-09-14

## 2018-09-14 RX ADMIN — ATORVASTATIN CALCIUM 80 MILLIGRAM(S): 80 TABLET, FILM COATED ORAL at 22:04

## 2018-09-14 RX ADMIN — CLOPIDOGREL BISULFATE 75 MILLIGRAM(S): 75 TABLET, FILM COATED ORAL at 11:59

## 2018-09-14 RX ADMIN — Medication 12.5 MILLIGRAM(S): at 06:22

## 2018-09-14 RX ADMIN — AMLODIPINE BESYLATE 10 MILLIGRAM(S): 2.5 TABLET ORAL at 06:22

## 2018-09-14 RX ADMIN — ENOXAPARIN SODIUM 40 MILLIGRAM(S): 100 INJECTION SUBCUTANEOUS at 11:59

## 2018-09-14 RX ADMIN — Medication 81 MILLIGRAM(S): at 11:58

## 2018-09-14 RX ADMIN — DONEPEZIL HYDROCHLORIDE 5 MILLIGRAM(S): 10 TABLET, FILM COATED ORAL at 22:04

## 2018-09-14 RX ADMIN — Medication 12.5 MILLIGRAM(S): at 16:37

## 2018-09-14 NOTE — PROGRESS NOTE ADULT - SUBJECTIVE AND OBJECTIVE BOX
MELONY VITAL  83y  Male    Patient is a 83y old  Male who presents with a chief complaint of CVA (13 Sep 2018 12:10)      Pt seen and examined. No acute events overnight. Pt remains hypertensive     PAST MEDICAL & SURGICAL HISTORY:  HTN (hypertension)  No significant past surgical history        MedsMEDICATIONS  (STANDING):  amLODIPine   Tablet 10 milliGRAM(s) Oral daily  aspirin enteric coated 81 milliGRAM(s) Oral daily  atorvastatin 80 milliGRAM(s) Oral at bedtime  clopidogrel Tablet 75 milliGRAM(s) Oral daily  donepezil 5 milliGRAM(s) Oral at bedtime  enoxaparin Injectable 40 milliGRAM(s) SubCutaneous daily  hydrochlorothiazide 12.5 milliGRAM(s) Oral daily    MEDICATIONS  (PRN):      Vital Signs Last 24 Hrs  T(C): 36.4 (14 Sep 2018 07:21), Max: 36.7 (13 Sep 2018 20:05)  T(F): 97.6 (14 Sep 2018 07:21), Max: 98.1 (13 Sep 2018 20:05)  HR: 64 (14 Sep 2018 07:21) (64 - 84)  BP: 174/83 (14 Sep 2018 07:21) (112/74 - 174/83)  BP(mean): --  RR: 15 (14 Sep 2018 07:21) (15 - 15)  SpO2: 97% (14 Sep 2018 07:21) (97% - 98%)    PHYSICAL EXAM:  GENERAL: NAD  HEAD:  NC/AT  EYES: EOMI, PERRLA, conjunctiva and sclera clear  NERVOUS SYSTEM:  Alert & Oriented X3  CHEST/LUNG: Clear lungs b/l  HEART: S1S2, RRR   ABDOMEN: Soft, non-tender, non-distended, + bowel sounds  EXTREMITIES:  2+ Peripheral Pulses, No clubbing, cyanosis, or edema      LABS:                          12.6   5.4   )-----------( 152      ( 13 Sep 2018 06:15 )             37.5       09-13    140  |  106  |  15  ----------------------------<  92  3.7   |  27  |  0.97    Ca    9.2      13 Sep 2018 06:15                                      RADIOLOGY & ADDITIONAL TESTS:    Imaging Personally Reviewed:  [ ] YES  [ ] NO      HEALTH ISSUES - PROBLEM Dx:  Prophylactic measure: Prophylactic measure  Impaired mobility and ADLs: Impaired mobility and ADLs  Essential hypertension: Essential hypertension  Cerebrovascular accident (CVA) due to other mechanism: Cerebrovascular accident (CVA) due to other mechanism          Care Discussed with Consultants/Other Providers [ x] YES  [ ] NO

## 2018-09-14 NOTE — PROGRESS NOTE ADULT - SUBJECTIVE AND OBJECTIVE BOX
HISTORY OF PRESENT ILLNESS  82yo left hand dominant M with multiple vascular risk factors, including age and HTN admitted to [facility] on 9/3/18 for acute onset of dysarthria. He was subsequently transferred to [facility] for further management of "CTA findings." Patient dx with acute bilateral inferior cerebellar hemisphere infarcts based on MRI.     CT brain on 9/3 did not show any evidence of acute infarct or hemorrhage. 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 MRI brain on 9/4 showed bilateral PICA distribution and left PCA distribution punctate infarcts and severe leukoaraiosis. TTE did not show any obvious structural cardiac source of embolism nor showed any evidence of a PFO. 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 symptomatic intracranial atherosclerosis. ASA and Plavix for second stroke prevention as per SAMRAS. TTE: EF 75%, mild MR, minimal AR, mild diastolic dysfunction (Stage I), cardiac monitoring without any acute events. Dysphagia screen - passed, tolerating regular diet. Patient medically optimized for dc to acute rehab 9/10/18.    TODAY'S SUBJECTIVE & REVIEW OF SYMPTOMS  [X] Constiutional WNL            [  ] Cardio WNL               [X] Resp WNL  [X] GI WNL                            [X]  WNL                    [X] Heme WNL  [X] Endo WNL                       [X] Skin WNL                   [X] MSK WNL  [  ] Neuro WNL                     [  ] Cognitive WNL            [X] Psych WNL    Patient seen and examined in therapy. Ambulating well with therapy. Continues to have elevated blood pressure in the AM. Controlled during therapy session and without symptoms. Will adjust medications to receive one at bedtime and one in the daytime. Continue to monitor orthostatics. Will reach out to family about providing supervision at home.     VITALS  T(C): 36.4 (09-14-18 @ 07:21)  T(F): 97.6 (09-14-18 @ 07:21), Max: 98.1 (09-13-18 @ 20:05)  HR: 64 (09-14-18 @ 07:21) (64 - 84)  BP: 174/83 (09-14-18 @ 07:21) (112/74 - 174/83)  RR:  (15 - 15)  SpO2:  (97% - 98%)  Wt(kg): --    PHYSICAL EXAM  Gen - NAD, Comfortable  HEENT - NCAT, EOMI  Neck - Supple, No limited ROM  Pulm - CTAB, No wheeze, No rhonchi, No crackles  Cardiovascular - RRR, S1S2, No murmurs  Abdomen - Soft, NT/ND, +BS  Extremities - No C/C/E, No calf tenderness  Neuro-  	   Cognitive - AAOx3; required cuing for year, Impaired comprehension, recall, carryover, and insight (improving)  	   Communication - Fluent, No dysarthria  	   Cranial Nerves - CN 2-12 intact B/L  	   Motor - No focal deficits  	                  LEFT    UE - ShAB 5/5, EF 5/5, EE 5/5, WE 5/5,  5/5  	                  RIGHT UE - ShAB 5/5, EF 5/5, EE 5/5, WE 5/5,  5/5  	                  LEFT    LE - HF 5/5, KE 5/5, DF 5/5, PF 5/5, EHL 5/5  	                  RIGHT LE - HF 5/5, KE 5/5, DF 5/5, PF 5/5, EHL 5/5  	   Sensory - Intact to LT B/L  	   Tone - normal  	   Coordination - FTN mildly impaired left > right; HTS intact B/L  Psychiatric - Mood stable, Affect WNL  Skin - all skin intact    RECENT LABS/IMAGING             12.6   5.4   )-----------( 152      ( 13 Sep 2018 06:15 )             37.5     140  |  106  |  15  ----------------------<  92  3.7   |  27  |  0.97    Ca    9.2      13 Sep 2018 06:15    MEDICATIONS   MEDICATIONS  (STANDING):  aspirin enteric coated 81 milliGRAM(s) Oral daily  atorvastatin 80 milliGRAM(s) Oral at bedtime  clopidogrel Tablet 75 milliGRAM(s) Oral daily  donepezil 5 milliGRAM(s) Oral at bedtime  enoxaparin Injectable 40 milliGRAM(s) SubCutaneous daily  hydrochlorothiazide 12.5 milliGRAM(s) Oral <User Schedule>    ASSESSMENT/PLAN  83 year-old with a PMH of HTN admitted for bilateral cerebellar CVA now with gait instability, ADL impairments and functional impairments.    1. Bilateral cerebellar CVA - Continue 3 hours of comprehensive therapy consisting of PT/OT/SLP. Fall and cardiac precautions.    2. Secondary CVA PPx - Continue ASA, Atorvastatin, and Plavix    3. Cognitive impairments (impaired comprehension, recall, carry-over, poor safety) - Improving with constant cueing, Continue Donepezil 5mg QHS (started 9/13)    3. HTN - Continues to have episodes of high blood pressure especially in AM - change Norvasc to QHS and increase HCTZ and give in AM. Continue to monitor orthostatics    4. DVT PPx - Lovenox    5. Diet - Soft/thins    6. Skin - Turn and position Q2hrs    7. GI/ management - Toilet PRN    8. Disposition - Discussed at interdisciplinary team meeting on 9/13 - tolerating therapy well but limited by poor comprehension, carry-over, and recall. Currently functioning at min assist with ADL's, ambulating 200' with supervision and climbing 12 stairs with supervision, With SLP, currently a Mod/Max assist level. Will need supervision at home upon discharge, will speak with family to discuss future discharge plans. Estimated date of discharge on 9/19/18. HISTORY OF PRESENT ILLNESS  84yo left hand dominant M with multiple vascular risk factors, including age and HTN admitted to [facility] on 9/3/18 for acute onset of dysarthria. He was subsequently transferred to [facility] for further management of "CTA findings." Patient dx with acute bilateral inferior cerebellar hemisphere infarcts based on MRI.     CT brain on 9/3 did not show any evidence of acute infarct or hemorrhage. 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 MRI brain on 9/4 showed bilateral PICA distribution and left PCA distribution punctate infarcts and severe leukoaraiosis. TTE did not show any obvious structural cardiac source of embolism nor showed any evidence of a PFO. 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 symptomatic intracranial atherosclerosis. ASA and Plavix for second stroke prevention as per SAMRAS. TTE: EF 75%, mild MR, minimal AR, mild diastolic dysfunction (Stage I), cardiac monitoring without any acute events. Dysphagia screen - passed, tolerating regular diet. Patient medically optimized for dc to acute rehab 9/10/18.    TODAY'S SUBJECTIVE & REVIEW OF SYMPTOMS  [X] Constiutional WNL            [  ] Cardio WNL               [X] Resp WNL  [X] GI WNL                            [X]  WNL                    [X] Heme WNL  [X] Endo WNL                       [X] Skin WNL                   [X] MSK WNL  [  ] Neuro WNL                     [  ] Cognitive WNL            [X] Psych WNL    Patient seen and examined in therapy. Ambulating well with therapy. Continues to have elevated blood pressure in the AM. Controlled during therapy session and without symptoms. Will adjust medications to receive one at bedtime and one in the daytime. Continue to monitor orthostatics. Will reach out to family about providing supervision at home.     VITALS  T(C): 36.4 (09-14-18 @ 07:21)  T(F): 97.6 (09-14-18 @ 07:21), Max: 98.1 (09-13-18 @ 20:05)  HR: 64 (09-14-18 @ 07:21) (64 - 84)  BP: 174/83 (09-14-18 @ 07:21) (112/74 - 174/83)  RR:  (15 - 15)  SpO2:  (97% - 98%)  Wt(kg): --    PHYSICAL EXAM  Gen - NAD, Comfortable  HEENT - NCAT, EOMI  Neck - Supple, No limited ROM  Pulm - CTAB, No wheeze, No rhonchi, No crackles  Cardiovascular - RRR, S1S2, No murmurs  Abdomen - Soft, NT/ND, +BS  Extremities - No C/C/E, No calf tenderness  Neuro-  	   Cognitive - AAOx3; required cuing for year, Impaired comprehension, recall, carryover, and insight (improving)  	   Communication - Fluent, No dysarthria  	   Cranial Nerves - CN 2-12 intact B/L  	   Motor - No focal deficits  	                  LEFT    UE - ShAB 5/5, EF 5/5, EE 5/5, WE 5/5,  5/5  	                  RIGHT UE - ShAB 5/5, EF 5/5, EE 5/5, WE 5/5,  5/5  	                  LEFT    LE - HF 5/5, KE 5/5, DF 5/5, PF 5/5, EHL 5/5  	                  RIGHT LE - HF 5/5, KE 5/5, DF 5/5, PF 5/5, EHL 5/5  	   Sensory - Intact to LT B/L  	   Tone - normal  	   Coordination - FTN mildly impaired left > right; HTS intact B/L  Psychiatric - Mood stable, Affect WNL  Skin - all skin intact    RECENT LABS/IMAGING             12.6   5.4   )-----------( 152      ( 13 Sep 2018 06:15 )             37.5     140  |  106  |  15  ----------------------<  92  3.7   |  27  |  0.97    Ca    9.2      13 Sep 2018 06:15    MEDICATIONS   MEDICATIONS  (STANDING):  aspirin enteric coated 81 milliGRAM(s) Oral daily  atorvastatin 80 milliGRAM(s) Oral at bedtime  clopidogrel Tablet 75 milliGRAM(s) Oral daily  donepezil 5 milliGRAM(s) Oral at bedtime  enoxaparin Injectable 40 milliGRAM(s) SubCutaneous daily  hydrochlorothiazide 12.5 milliGRAM(s) Oral <User Schedule>    ASSESSMENT/PLAN  83 year-old with a PMH of HTN admitted for bilateral cerebellar CVA secondary to large vessel disease. He is admitted to acute rehabilitation for gait instability, ADL impairments, cognitive, safety, and functional impairments.    1. Bilateral cerebellar CVA - Continue 3 hours of comprehensive therapy consisting of PT/OT/SLP. Fall and cardiac precautions.    2. Secondary CVA PPx - Continue ASA, Atorvastatin, and Plavix    3. Cognitive impairments (impaired comprehension, recall, carry-over, poor safety) - Improving with constant cueing, Continue Donepezil 5mg QHS (started 9/13)    3. HTN - Continues to have episodes of high blood pressure especially in AM - change Norvasc to QHS and increase HCTZ and give in AM. Continue to monitor orthostatics    4. DVT PPx - Lovenox    5. Diet - Soft/thins    6. Skin - Turn and position Q2hrs    7. GI/ management - Toilet PRN    8. Disposition - Discussed at interdisciplinary team meeting on 9/13 - tolerating therapy well but limited by poor comprehension, carry-over, and recall. Currently functioning at min assist with ADL's, ambulating 200' with supervision and climbing 12 stairs with supervision, With SLP, currently a Mod/Max assist level. Will need supervision at home upon discharge, will speak with family to discuss future discharge plans. Estimated date of discharge on 9/19/18.

## 2018-09-14 NOTE — PROGRESS NOTE ADULT - SUBJECTIVE AND OBJECTIVE BOX
S:  Interval history:  No acute events overnight. This morning patient has no complaints. Sleeping well. Denies pain. Urinating appropriately. Last BM? Denies HA, dizziness, light-headedness, palpitations, and N/V.     O:  T(C): 36.4 (09-14-18 @ 07:21)  T(F): 97.6 (09-14-18 @ 07:21), Max: 98.1 (09-13-18 @ 20:05)  HR: 64 (09-14-18 @ 07:21) (64 - 84)  BP: 174/83 (09-14-18 @ 07:21) (112/74 - 174/83)  RR:  (15 - 15)  SpO2:  (97% - 98%)  PE:  Gen: well-appearing, sitting upright in wheelchair during OT  Neuro: grossly alert and oriented  CV: RRR, S1, S2, no rubs, murmurs, gallops  Resp: breathing comfortably, CTA b/l  Abd: soft, NT/ND, normoactive BS throughout  Ext: no LE edema or tenderness to palpation.    Labs:  Routine - next 9/17

## 2018-09-14 NOTE — PROGRESS NOTE ADULT - ASSESSMENT
82yo LHD man with PMH of HTN admitted for bilateral cerebellar ischemic CVA with residual functional impairments, ADL impairments, ambulation impairments and speech/swallow impairments.    1. Bilateral cerebellar ischemic CVA - likely large vessel etiology  -secondary stroke prevention: continue ASA 81mg QD, clopidogrel 75mg QD, atorvastatin 80mg QD, stroke education  -PT/OT/SLP  -Functional status (9/13):   -Ambulation - Current: 200ft w/ supervision/contact guard w/o AD. Goal: Mod-I  -Transfers - Current: supervision. Goal: supervision  -ADL's - Current: supervision to min assist. Goal: Mod-I to supervision.     2. HTN - On amlodipine 10mg QD and HCTZ 12.5mg QD. SBP up to 170's this AM. -130's during therapy in late morning. BP elevated in morning but appropriate throughout the day.  -Hospitalist following - rec's appreciated  -Increase HCTZ to 25mg QD (AM)  -Change amlodipine 10mg from AM to QHS  -Re-check orthostatics  -continue to monitor    3. Cognition - mild cognitive impairment  -continue donepezil 5mg QHS    4. Dysphagia - evaluated by SLP 9/11  -tolerating diet  -continue with soft with thins    5. Hyponatremia - 9/13 Na 140. Resolved.  -routine labs  -BMP 9/17    6. Thrombocytopenia - 9/13 Plt 152. Resolved  -routine labs  -CBC 9/17    7. DVT ppx  -c/w enoxaparin 40mg SQ QD    8. D/C planning  -IDT rounds today: plan for 9/19 d/c to home with supervision. Barriers of safety awareness requiring supervision.

## 2018-09-15 PROCEDURE — 99232 SBSQ HOSP IP/OBS MODERATE 35: CPT

## 2018-09-15 RX ADMIN — CLOPIDOGREL BISULFATE 75 MILLIGRAM(S): 75 TABLET, FILM COATED ORAL at 13:25

## 2018-09-15 RX ADMIN — AMLODIPINE BESYLATE 10 MILLIGRAM(S): 2.5 TABLET ORAL at 21:49

## 2018-09-15 RX ADMIN — ATORVASTATIN CALCIUM 80 MILLIGRAM(S): 80 TABLET, FILM COATED ORAL at 21:49

## 2018-09-15 RX ADMIN — ENOXAPARIN SODIUM 40 MILLIGRAM(S): 100 INJECTION SUBCUTANEOUS at 13:26

## 2018-09-15 RX ADMIN — Medication 81 MILLIGRAM(S): at 13:25

## 2018-09-15 RX ADMIN — DONEPEZIL HYDROCHLORIDE 5 MILLIGRAM(S): 10 TABLET, FILM COATED ORAL at 21:49

## 2018-09-15 RX ADMIN — Medication 25 MILLIGRAM(S): at 05:47

## 2018-09-15 NOTE — PROGRESS NOTE ADULT - SUBJECTIVE AND OBJECTIVE BOX
Chief complaint: no acute events overnight    Patient is a 83y old  Male who presents with a chief complaint of CVA (14 Sep 2018 12:17)        HEALTH ISSUES - PROBLEM Dx:  Prophylactic measure: Prophylactic measure  Impaired mobility and ADLs: Impaired mobility and ADLs  Essential hypertension: Essential hypertension  Cerebrovascular accident (CVA) due to other mechanism: Cerebrovascular accident (CVA) due to other mechanism            PMHx -   PAST MEDICAL & SURGICAL HISTORY:  HTN (hypertension)  No significant past surgical history         VITALS  Vital Signs Last 24 Hrs  T(C): 36.6 (15 Sep 2018 08:10), Max: 37 (14 Sep 2018 20:34)  T(F): 97.9 (15 Sep 2018 08:10), Max: 98.6 (14 Sep 2018 20:34)  HR: 68 (15 Sep 2018 08:10) (62 - 68)  BP: 142/78 (15 Sep 2018 08:10) (135/79 - 142/78)  BP(mean): --  RR: 14 (15 Sep 2018 08:10) (14 - 16)  SpO2: 98% (15 Sep 2018 08:10) (97% - 98%)      PHYSICAL EXAM  Constitutional - NAD, Comfortable  HEENT - NCAT, EOMI  Neck - Supple, No limited ROM  Chest - CTA bilaterally  Cardiovascular - RRR, S1S2, No murmurs  Abdomen - BS+, Soft, NTND  Extremities - No C/C/E, No calf tenderness   Neurologic Exam -                    Cognitive - Awake, Alert, , comfortable     No new focal deficits    CURRENT MEDICATIONS    MEDICATIONS  (STANDING):  amLODIPine   Tablet 10 milliGRAM(s) Oral at bedtime  aspirin enteric coated 81 milliGRAM(s) Oral daily  atorvastatin 80 milliGRAM(s) Oral at bedtime  clopidogrel Tablet 75 milliGRAM(s) Oral daily  donepezil 5 milliGRAM(s) Oral at bedtime  enoxaparin Injectable 40 milliGRAM(s) SubCutaneous daily  hydrochlorothiazide 25 milliGRAM(s) Oral daily    MEDICATIONS  (PRN):    ASSESSMENT & PLAN          GI/Bowel Management - Dulcolax PRN, Fleet PRN   Management - Toilet Q2  Skin - Turn Q2  Pain - Tylenol PRN  DVT PPX - Lovenox      Continue comprehensive acute rehab program consisting of 3hrs/day of OT/PT and SLP.

## 2018-09-16 PROCEDURE — 99232 SBSQ HOSP IP/OBS MODERATE 35: CPT

## 2018-09-16 RX ORDER — DOCUSATE SODIUM 100 MG
100 CAPSULE ORAL THREE TIMES A DAY
Qty: 0 | Refills: 0 | Status: DISCONTINUED | OUTPATIENT
Start: 2018-09-16 | End: 2018-09-19

## 2018-09-16 RX ORDER — SENNA PLUS 8.6 MG/1
2 TABLET ORAL AT BEDTIME
Qty: 0 | Refills: 0 | Status: DISCONTINUED | OUTPATIENT
Start: 2018-09-16 | End: 2018-09-19

## 2018-09-16 RX ADMIN — SENNA PLUS 2 TABLET(S): 8.6 TABLET ORAL at 21:05

## 2018-09-16 RX ADMIN — CLOPIDOGREL BISULFATE 75 MILLIGRAM(S): 75 TABLET, FILM COATED ORAL at 11:27

## 2018-09-16 RX ADMIN — Medication 81 MILLIGRAM(S): at 11:27

## 2018-09-16 RX ADMIN — ATORVASTATIN CALCIUM 80 MILLIGRAM(S): 80 TABLET, FILM COATED ORAL at 21:04

## 2018-09-16 RX ADMIN — Medication 100 MILLIGRAM(S): at 21:06

## 2018-09-16 RX ADMIN — AMLODIPINE BESYLATE 10 MILLIGRAM(S): 2.5 TABLET ORAL at 21:04

## 2018-09-16 RX ADMIN — DONEPEZIL HYDROCHLORIDE 5 MILLIGRAM(S): 10 TABLET, FILM COATED ORAL at 21:07

## 2018-09-16 RX ADMIN — Medication 25 MILLIGRAM(S): at 05:15

## 2018-09-16 RX ADMIN — ENOXAPARIN SODIUM 40 MILLIGRAM(S): 100 INJECTION SUBCUTANEOUS at 11:27

## 2018-09-16 NOTE — PROGRESS NOTE ADULT - SUBJECTIVE AND OBJECTIVE BOX
Chief complaint: no acute events ovenight    Patient is a 83y old  Male who presents with a chief complaint of CVA (15 Sep 2018 15:47)        HEALTH ISSUES - PROBLEM Dx:  Prophylactic measure: Prophylactic measure  Impaired mobility and ADLs: Impaired mobility and ADLs  Essential hypertension: Essential hypertension  Cerebrovascular accident (CVA) due to other mechanism: Cerebrovascular accident (CVA) due to other mechanism            PMHx -   PAST MEDICAL & SURGICAL HISTORY:  HTN (hypertension)  No significant past surgical history         VITALS  Vital Signs Last 24 Hrs  T(C): 37.2 (15 Sep 2018 21:56), Max: 37.2 (15 Sep 2018 21:56)  T(F): 98.9 (15 Sep 2018 21:56), Max: 98.9 (15 Sep 2018 21:56)  HR: 67 (16 Sep 2018 09:28) (62 - 100)  BP: 125/88 (16 Sep 2018 09:28) (125/88 - 156/98)  BP(mean): --  RR: 15 (16 Sep 2018 09:28) (14 - 15)  SpO2: 97% (16 Sep 2018 09:28) (92% - 97%)      PHYSICAL EXAM  Constitutional - NAD, Comfortable  HEENT - NCAT, EOMI  Neck - Supple, No limited ROM  Chest - CTA bilaterally  Cardiovascular - RRR, S1S2, No murmurs  Abdomen - BS+, Soft, NTND  Extremities - No C/C/E, No calf tenderness   Neurologic Exam -                    Cognitive - Awake, Alert, comfortable       No new focal deficits              CURRENT MEDICATIONS    MEDICATIONS  (STANDING):  amLODIPine   Tablet 10 milliGRAM(s) Oral at bedtime  aspirin enteric coated 81 milliGRAM(s) Oral daily  atorvastatin 80 milliGRAM(s) Oral at bedtime  clopidogrel Tablet 75 milliGRAM(s) Oral daily  donepezil 5 milliGRAM(s) Oral at bedtime  enoxaparin Injectable 40 milliGRAM(s) SubCutaneous daily  hydrochlorothiazide 25 milliGRAM(s) Oral daily    MEDICATIONS  (PRN):    ASSESSMENT & PLAN          GI/Bowel Management - Dulcolax PRN, Fleet PRN   Management - Toilet Q2  Skin - Turn Q2  Pain - Tylenol PRN  DVT PPX - Lovenox      Continue comprehensive acute rehab program consisting of 3hrs/day of OT/PT and SLP.

## 2018-09-17 DIAGNOSIS — E87.6 HYPOKALEMIA: ICD-10-CM

## 2018-09-17 LAB
ANION GAP SERPL CALC-SCNC: 9 MMOL/L — SIGNIFICANT CHANGE UP (ref 5–17)
BUN SERPL-MCNC: 14 MG/DL — SIGNIFICANT CHANGE UP (ref 7–23)
CALCIUM SERPL-MCNC: 9.4 MG/DL — SIGNIFICANT CHANGE UP (ref 8.4–10.5)
CHLORIDE SERPL-SCNC: 101 MMOL/L — SIGNIFICANT CHANGE UP (ref 96–108)
CO2 SERPL-SCNC: 26 MMOL/L — SIGNIFICANT CHANGE UP (ref 22–31)
CREAT SERPL-MCNC: 1.04 MG/DL — SIGNIFICANT CHANGE UP (ref 0.5–1.3)
GLUCOSE SERPL-MCNC: 97 MG/DL — SIGNIFICANT CHANGE UP (ref 70–99)
HCT VFR BLD CALC: 38.1 % — LOW (ref 39–50)
HGB BLD-MCNC: 12.6 G/DL — LOW (ref 13–17)
MCHC RBC-ENTMCNC: 28.2 PG — SIGNIFICANT CHANGE UP (ref 27–34)
MCHC RBC-ENTMCNC: 33 GM/DL — SIGNIFICANT CHANGE UP (ref 32–36)
MCV RBC AUTO: 85.7 FL — SIGNIFICANT CHANGE UP (ref 80–100)
PLATELET # BLD AUTO: 218 K/UL — SIGNIFICANT CHANGE UP (ref 150–400)
POTASSIUM SERPL-MCNC: 3.3 MMOL/L — LOW (ref 3.5–5.3)
POTASSIUM SERPL-SCNC: 3.3 MMOL/L — LOW (ref 3.5–5.3)
RBC # BLD: 4.45 M/UL — SIGNIFICANT CHANGE UP (ref 4.2–5.8)
RBC # FLD: 13 % — SIGNIFICANT CHANGE UP (ref 10.3–14.5)
SODIUM SERPL-SCNC: 136 MMOL/L — SIGNIFICANT CHANGE UP (ref 135–145)
WBC # BLD: 8 K/UL — SIGNIFICANT CHANGE UP (ref 3.8–10.5)
WBC # FLD AUTO: 8 K/UL — SIGNIFICANT CHANGE UP (ref 3.8–10.5)

## 2018-09-17 PROCEDURE — 99233 SBSQ HOSP IP/OBS HIGH 50: CPT

## 2018-09-17 PROCEDURE — 99232 SBSQ HOSP IP/OBS MODERATE 35: CPT

## 2018-09-17 RX ORDER — POTASSIUM CHLORIDE 20 MEQ
40 PACKET (EA) ORAL ONCE
Qty: 0 | Refills: 0 | Status: COMPLETED | OUTPATIENT
Start: 2018-09-17 | End: 2018-09-17

## 2018-09-17 RX ADMIN — Medication 40 MILLIEQUIVALENT(S): at 12:36

## 2018-09-17 RX ADMIN — Medication 25 MILLIGRAM(S): at 06:41

## 2018-09-17 RX ADMIN — SENNA PLUS 2 TABLET(S): 8.6 TABLET ORAL at 21:05

## 2018-09-17 RX ADMIN — ATORVASTATIN CALCIUM 80 MILLIGRAM(S): 80 TABLET, FILM COATED ORAL at 21:05

## 2018-09-17 RX ADMIN — CLOPIDOGREL BISULFATE 75 MILLIGRAM(S): 75 TABLET, FILM COATED ORAL at 12:35

## 2018-09-17 RX ADMIN — ENOXAPARIN SODIUM 40 MILLIGRAM(S): 100 INJECTION SUBCUTANEOUS at 12:36

## 2018-09-17 RX ADMIN — DONEPEZIL HYDROCHLORIDE 5 MILLIGRAM(S): 10 TABLET, FILM COATED ORAL at 21:05

## 2018-09-17 RX ADMIN — Medication 81 MILLIGRAM(S): at 12:35

## 2018-09-17 RX ADMIN — AMLODIPINE BESYLATE 10 MILLIGRAM(S): 2.5 TABLET ORAL at 21:05

## 2018-09-17 RX ADMIN — Medication 100 MILLIGRAM(S): at 21:05

## 2018-09-17 RX ADMIN — Medication 100 MILLIGRAM(S): at 06:41

## 2018-09-17 RX ADMIN — Medication 100 MILLIGRAM(S): at 15:49

## 2018-09-17 NOTE — PROGRESS NOTE ADULT - SUBJECTIVE AND OBJECTIVE BOX
MELONY VITAL  83y  Male    Patient is a 83y old  Male who presents with a chief complaint of CVA (16 Sep 2018 13:33)    Pt seen and examined, no acute events overnight , feels well     PAST MEDICAL & SURGICAL HISTORY:  HTN (hypertension)  No significant past surgical history        MedsMEDICATIONS  (STANDING):  amLODIPine   Tablet 10 milliGRAM(s) Oral at bedtime  aspirin enteric coated 81 milliGRAM(s) Oral daily  atorvastatin 80 milliGRAM(s) Oral at bedtime  clopidogrel Tablet 75 milliGRAM(s) Oral daily  docusate sodium 100 milliGRAM(s) Oral three times a day  donepezil 5 milliGRAM(s) Oral at bedtime  enoxaparin Injectable 40 milliGRAM(s) SubCutaneous daily  hydrochlorothiazide 25 milliGRAM(s) Oral daily  senna 2 Tablet(s) Oral at bedtime    MEDICATIONS  (PRN):      Vital Signs Last 24 Hrs  T(C): 37.2 (16 Sep 2018 20:17), Max: 37.2 (16 Sep 2018 20:17)  T(F): 99 (16 Sep 2018 20:17), Max: 99 (16 Sep 2018 20:17)  HR: 70 (16 Sep 2018 20:17) (67 - 70)  BP: 150/77 (16 Sep 2018 20:17) (125/88 - 150/77)  BP(mean): --  RR: 14 (16 Sep 2018 20:17) (14 - 15)  SpO2: 94% (16 Sep 2018 20:17) (94% - 97%)    PHYSICAL EXAM:  GENERAL: NAD  HEAD:  NC/AT  EYES: EOMI, PERRLA, conjunctiva and sclera clear  NERVOUS SYSTEM:  Alert & Oriented X3  CHEST/LUNG: Clear lungs b/l  HEART: S1S2, RRR   ABDOMEN: Soft, non-tender, non-distended, + bowel sounds  EXTREMITIES:  2+ Peripheral Pulses, No clubbing, cyanosis, or edema  SKIN: No rashes or lesions    LABS:                          12.6   8.0   )-----------( 218      ( 17 Sep 2018 05:40 )             38.1       09-17    136  |  101  |  14  ----------------------------<  97  3.3<L>   |  26  |  1.04    Ca    9.4      17 Sep 2018 05:40                                      RADIOLOGY & ADDITIONAL TESTS:    Imaging Personally Reviewed:  [ ] YES  [ ] NO      HEALTH ISSUES - PROBLEM Dx:  Prophylactic measure: Prophylactic measure  Impaired mobility and ADLs: Impaired mobility and ADLs  Essential hypertension: Essential hypertension  Cerebrovascular accident (CVA) due to other mechanism: Cerebrovascular accident (CVA) due to other mechanism          Care Discussed with Consultants/Other Providers [ x] YES  [ ] NO

## 2018-09-17 NOTE — CHART NOTE - NSCHARTNOTEFT_GEN_A_CORE
Nutrition follow up     Hospital course as per chart: Pt 82 y/o M with PMH: HTN, admitted for bilateral cerebellar CVA now with gait instability, ADL impairments and functional impairments, hyponatremia, hypokalemia - replete PO.    Pt reports good appetite and PO intake. Noted % PO intake as per flow sheets. Noted pt previously on regular consistency, changed to soft as per SLP recommendations (09/11). Pt reports tolerating current diet consistency, denies difficulty chewing/swallowing. Pt denies nausea, vomiting, diarrhea, or constipation. Last BM 2 days ago (09/15) as per flow sheets - pt on bowel regimen as per chart. Pt denies having questions/concerns about diet and nutrition.     Source: Patient [x]    Family [ ]     other [x]; Medical record    Diet: soft + no pork + no shellfish     Enteral /Parenteral Nutrition: n/a    Current Weight: (09/10) 159.6 pounds -> (09/14) 159.6 pounds - weight likely to be stable at this time.   % Weight Change: n/a    Pertinent Medications: MEDICATIONS  (STANDING):  amLODIPine   Tablet 10 milliGRAM(s) Oral at bedtime  aspirin enteric coated 81 milliGRAM(s) Oral daily  atorvastatin 80 milliGRAM(s) Oral at bedtime  clopidogrel Tablet 75 milliGRAM(s) Oral daily  docusate sodium 100 milliGRAM(s) Oral three times a day  donepezil 5 milliGRAM(s) Oral at bedtime  enoxaparin Injectable 40 milliGRAM(s) SubCutaneous daily  hydrochlorothiazide 25 milliGRAM(s) Oral daily  potassium chloride    Tablet ER 40 milliEquivalent(s) Oral once  senna 2 Tablet(s) Oral at bedtime    Pertinent Labs: (09/17)K+ 3.3 mmol/L<L>   (09/04) [id number] 5.6 %     Skin: no noted pressure injuries as per documentation.   No noted edema as per flow sheets.     Estimated Needs:   [x] no change since previous assessment  [ ] recalculated:     Previous Nutrition Diagnosis:     [x] no previous nutrition diagnosis     Nutrition Diagnosis is [ ] ongoing  [ ] resolved [x] not applicable     New Nutrition Diagnosis: [x] Difficulty chewing related to decreased ability to chew hard foods as evidenced by diet changed from regular to soft consistency.     Interventions:     1. Recommend continue current diet as above. Defer diet/fluid consistencies to medical team/SLP recommendations.   2. Obtain current weight to identify changes if any.   3. Encourage PO intake, obtain food preferences, provide feeding assistance as needed.  4. Provide K+ supplementation to attain normal serum levels if applicable.     Monitoring and Evaluation:     [x] PO intake [x] Tolerance to diet prescription [x] weights [x] follow up per protocol    RD remains available.  Willow Randle RDN

## 2018-09-17 NOTE — PROGRESS NOTE ADULT - SUBJECTIVE AND OBJECTIVE BOX
HPI:  84yo left hand dominant M with multiple vascular risk factors, including age and HTN admitted to [facility] on 9/3/18 for acute onset of dysarthria. He was subsequently transferred to [facility] for further management of "CTA findings." Patient dx with acute bilateral inferior cerebellar hemisphere infarcts based on MRI.     CT brain on 9/3 did not show any evidence of acute infarct or hemorrhage. 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 MRI brain on 9/4 showed bilateral PICA distribution and left PCA distribution punctate infarcts and severe leukoaraiosis. TTE did not show any obvious structural cardiac source of embolism nor showed any evidence of a PFO. 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 symptomatic intracranial atherosclerosis. ASA and Plavix for second stroke prevention as per SAMMPRAS. TTE: EF 75%, mild MR, minimal AR, mild diastolic dysfunction (Stage I), cardiac monitoring without any acute events. Dysphagia screen - passed, tolerating regular diet. Patient medically optimized for dc to acute rehab 9/10/18.    LDL - 114  HbA1c - 5.6 (10 Sep 2018 15:43)    Subjective: Pt reports that he is doing well. Denies new sx or concerns.     REVIEW OF SYMPTOMS  Pertinent in the last 24hrs: Neurological deficits    VITALS  Vital Signs Last 24 Hrs  T(C): 36.8 (17 Sep 2018 09:00), Max: 37.2 (16 Sep 2018 20:17)  T(F): 98.3 (17 Sep 2018 09:00), Max: 99 (16 Sep 2018 20:17)  HR: 66 (17 Sep 2018 09:00) (66 - 70)  BP: 137/84 (17 Sep 2018 09:00) (137/84 - 150/77)  BP(mean): --  RR: 14 (17 Sep 2018 09:00) (14 - 14)  SpO2: 95% (17 Sep 2018 09:00) (94% - 95%)      PHYSICAL EXAM  Gen - NAD, Comfortable  HEENT - NCAT, Cataract in L eye and minimally reactive, R pupil sluggish  Neck - Supple, No limited ROM  Pulm - CTAB, No wheeze, No rhonchi, No crackles  Cardiovascular - RRR, S1S2, No murmurs  Abdomen - Soft, NT/ND, +BS  Extremities - No C/C/E, No calf tenderness  Neuro-  	   Cognitive - AAOx3 person, place, month and year, not to date. Impaired comprehension, recall, carryover, and insight (improving)  	   Communication - Fluent, No dysarthria  	   Cranial Nerves - CN 2-12 grossly intact, unable to assess visual fields 2/2 patient unable to keep eyes focused on examiner.   	   Motor -   	                  LEFT    UE - ShAB 5/5, EF 5/5, EE 5/5, WE 5/5,  5/5  	                  RIGHT UE - ShAB 5/5, EF 5/5, EE 5/5, WE 5/5,  5/5  	                  LEFT    LE - HF 5/5, KE 5/5, DF 5/5, PF 5/5, EHL 5/5  	                  RIGHT LE - HF 5/5, KE 5/5, DF 5/5, PF 5/5, EHL 5/5  	   Sensory - Intact to LT B/L  	   Tone - normal  	   Coordination - FTN mildly impaired left > right; HTS intact B/L  Psychiatric - Mood stable, Affect WNL    RECENT LABS                        12.6   8.0   )-----------( 218      ( 17 Sep 2018 05:40 )             38.1     09-17    136  |  101  |  14  ----------------------------<  97  3.3<L>   |  26  |  1.04    Ca    9.4      17 Sep 2018 05:40              RADIOLOGY/OTHER RESULTS      MEDICATIONS  (STANDING):  amLODIPine   Tablet 10 milliGRAM(s) Oral at bedtime  aspirin enteric coated 81 milliGRAM(s) Oral daily  atorvastatin 80 milliGRAM(s) Oral at bedtime  clopidogrel Tablet 75 milliGRAM(s) Oral daily  docusate sodium 100 milliGRAM(s) Oral three times a day  donepezil 5 milliGRAM(s) Oral at bedtime  enoxaparin Injectable 40 milliGRAM(s) SubCutaneous daily  hydrochlorothiazide 25 milliGRAM(s) Oral daily  senna 2 Tablet(s) Oral at bedtime    MEDICATIONS  (PRN):

## 2018-09-17 NOTE — PROGRESS NOTE ADULT - SUBJECTIVE AND OBJECTIVE BOX
S:  Interval history:  No acute events overnight. Patient has no complaints. Sleeping well. Denies pain. Per nursing BM and urinating.....??????   O:  T(C): 36.8 (09-17-18 @ 09:00)  T(F): 98.3 (09-17-18 @ 09:00), Max: 99 (09-16-18 @ 20:17)  HR: 66 (09-17-18 @ 09:00) (66 - 70)  BP: 137/84 (09-17-18 @ 09:00) (137/84 - 150/77)  RR:  (14 - 14)  SpO2:  (94% - 95%)  PE:  Gen: well-appearing, sitting upright in wheelchair  Neuro: AOx person, place, year, month, day, but not date. Attention impaired. Naming intact. Repetition intact. Comprehension following simple, midline, and complex commands. Fluency intact. Memory registration intact, short-term recall impaired - 0/3 recall at 3-5min with hint or multiple choice.   CN: grossly intact. unable to assess visual fields 2/2 patient unable to keep eyes focused on examiner.  CV: RRR, S1, S2, no rubs, murmurs, gallops  Resp: breathing comfortably, CTA b/l  Abd: soft, NT/ND, normoactive BS throughout  Ext: no LE edema or tenderness to palpation.    Labs:                        12.6   8.0   )-----------( 218      ( 17 Sep 2018 05:40 )             38.1     09-17    136  |  101  |  14  ----------------------------<  97  3.3<L>   |  26  |  1.04    Ca    9.4      17 Sep 2018 05:40 S:  Interval history:  No acute events overnight. Patient has no complaints. Sleeping well. Denies pain. Per nursing last BM 9/15, urinating appropriately.   O:  T(C): 36.8 (09-17-18 @ 09:00)  T(F): 98.3 (09-17-18 @ 09:00), Max: 99 (09-16-18 @ 20:17)  HR: 66 (09-17-18 @ 09:00) (66 - 70)  BP: 137/84 (09-17-18 @ 09:00) (137/84 - 150/77)  RR:  (14 - 14)  SpO2:  (94% - 95%)  PE:  Gen: well-appearing, sitting upright in wheelchair  Neuro: AOx person, place, year, month, day, but not date. Attention impaired. Naming intact. Repetition intact. Comprehension following simple, midline, and complex commands. Fluency intact. Memory registration intact, short-term recall impaired - 0/3 recall at 3-5min with hint or multiple choice.   CN: grossly intact. unable to assess visual fields 2/2 patient unable to keep eyes focused on examiner.  CV: RRR, S1, S2, no rubs, murmurs, gallops  Resp: breathing comfortably, CTA b/l  Abd: soft, NT/ND, normoactive BS throughout  Ext: no LE edema or tenderness to palpation.    Labs:                        12.6   8.0   )-----------( 218      ( 17 Sep 2018 05:40 )             38.1     09-17    136  |  101  |  14  ----------------------------<  97  3.3<L>   |  26  |  1.04    Ca    9.4      17 Sep 2018 05:40

## 2018-09-17 NOTE — PROGRESS NOTE ADULT - ASSESSMENT
83 year-old with a PMH of HTN admitted for bilateral cerebellar CVA secondary to large vessel disease. He is admitted to acute rehabilitation for gait instability, ADL impairments, cognitive, safety, and functional impairments.    1. Bilateral cerebellar CVA - Continue 3 hours of comprehensive therapy consisting of PT/OT/SLP. Fall and cardiac precautions.    2. Secondary CVA PPx - Continue ASA, Atorvastatin, and Plavix    3. Cognitive impairments (impaired comprehension, recall, carry-over, poor safety) - Improving with constant cueing, Continue Donepezil 5mg QHS (started 9/13)    3. HTN - BP stable. Continue with Norvasc 10mg  QHS and  HCTZ 25 qd. Continue to monitor vitals    4. DVT PPx - Lovenox    5. Diet - Soft/thins    6. Skin - Turn and position Q2hrs    7. GI/ management - Toilet PRN    8. Disposition - Discussed at interdisciplinary team meeting on 9/13 - tolerating therapy well but limited by poor comprehension, carry-over, and recall. Currently functioning at min assist with ADL's, ambulating 200' with supervision and climbing 12 stairs with supervision, With SLP, currently a Mod/Max assist level. Will need supervision at home upon discharge, will speak with family to discuss future discharge plans. Estimated date of discharge on 9/19/18.

## 2018-09-17 NOTE — PROGRESS NOTE ADULT - ASSESSMENT
84yo LHD man with PMH of HTN admitted for bilateral cerebellar ischemic CVA with residual functional impairments, ADL impairments, ambulation impairments and speech/swallow impairments.    1. Bilateral cerebellar ischemic CVA - likely large vessel etiology  -secondary stroke prevention: continue ASA 81mg QD, clopidogrel 75mg QD, atorvastatin 80mg QD, stroke education  -PT/OT/SLP  -Functional status (9/13):   -Ambulation - Current: 200ft w/ supervision/contact guard w/o AD. Goal: Mod-I  -Transfers - Current: supervision. Goal: supervision  -ADL's - Current: supervision to min assist. Goal: Mod-I to supervision.     2. HTN - On amlodipine 10mg QD QHS and HCTZ 25mg QD. BP stable throughout day, acceptable.  -Hospitalist following - rec's appreciated  -continue amlodipine and HCTZ  -check orthostatics: has not had orthostatic hypotension during admission  -continue to monitor    3. Cognition - mild cognitive impairment, memory impaired. Donepezil started 9/13. May inc to 10mg in 4-6wks.  -continue donepezil 5mg QHS    4. Dysphagia - evaluated by SLP 9/11  -tolerating diet  -continue with soft with thins    5. Electrolyte abnormalities:   -Hypokalemia may be 2/2 HCTZ - 9/17 K 3.3  -replete KCl 40mg PO  -Hyponatremia - 9/17 Na 136. Resolved.  -routine labs: BMP 9/20    6. Thrombocytopenia - 9/17 Plt 218. Resolved  -routine labs: CBC 9/20    7. Constipation - 9/16 started colace 100mg TID, senna 2tab QD, miralax 17g PRN  -continue regimen, f/u nursing on BM    8. DVT ppx  -c/w enoxaparin 40mg SQ QD    9. D/C planning  -IDT rounds today: plan for 9/19 d/c to home with supervision. Barriers of safety awareness requiring supervision.  -speak with family regarding d/c with supervision 82yo LHD man with PMH of HTN admitted for bilateral cerebellar ischemic CVA with residual functional impairments, ADL impairments, ambulation impairments and speech/swallow impairments.    1. Bilateral cerebellar ischemic CVA - likely large vessel etiology  -secondary stroke prevention: continue ASA 81mg QD, clopidogrel 75mg QD, atorvastatin 80mg QD, stroke education  -PT/OT/SLP  -Functional status (9/13):   -Ambulation - Current: 200ft w/ supervision/contact guard w/o AD. Goal: Mod-I  -Transfers - Current: supervision. Goal: supervision  -ADL's - Current: supervision to min assist. Goal: Mod-I to supervision.     2. HTN - On amlodipine 10mg QD QHS and HCTZ 25mg QD. BP stable throughout day, acceptable.  -Hospitalist following - rec's appreciated  -continue amlodipine and HCTZ  -check orthostatics: has not had orthostatic hypotension during admission  -continue to monitor    3. Cognition - mild cognitive impairment, memory impaired. Donepezil started 9/13. May inc to 10mg in 4-6wks.  -continue donepezil 5mg QHS    4. Dysphagia - evaluated by SLP 9/11  -tolerating diet  -continue with soft with thins    5. Electrolyte abnormalities:   -Hypokalemia may be 2/2 HCTZ - 9/17 K 3.3  -replete KCl 40mg PO  -Hyponatremia - 9/17 Na 136. Resolved.  -routine labs: BMP 9/20    6. Thrombocytopenia - 9/17 Plt 218. Resolved  -routine labs: CBC 9/20    7. Constipation - 9/15 Last BM. 9/16 started colace 100mg TID, senna 2tab QD, miralax 17g PRN  -continue regimen, f/u nursing on BM    8. DVT ppx  -c/w enoxaparin 40mg SQ QD    9. D/C planning  -IDT rounds today: plan for 9/19 d/c to home with supervision. Barriers of safety awareness requiring supervision.  -speak with family regarding d/c with supervision

## 2018-09-18 ENCOUNTER — TRANSCRIPTION ENCOUNTER (OUTPATIENT)
Age: 83
End: 2018-09-18

## 2018-09-18 PROCEDURE — 99232 SBSQ HOSP IP/OBS MODERATE 35: CPT

## 2018-09-18 PROCEDURE — 99233 SBSQ HOSP IP/OBS HIGH 50: CPT

## 2018-09-18 RX ORDER — DONEPEZIL HYDROCHLORIDE 10 MG/1
1 TABLET, FILM COATED ORAL
Qty: 30 | Refills: 0 | OUTPATIENT
Start: 2018-09-18 | End: 2018-10-17

## 2018-09-18 RX ORDER — ATORVASTATIN CALCIUM 80 MG/1
1 TABLET, FILM COATED ORAL
Qty: 30 | Refills: 0 | OUTPATIENT
Start: 2018-09-18 | End: 2018-10-17

## 2018-09-18 RX ORDER — AMLODIPINE BESYLATE 2.5 MG/1
1 TABLET ORAL
Qty: 30 | Refills: 0 | OUTPATIENT
Start: 2018-09-18 | End: 2018-10-17

## 2018-09-18 RX ORDER — CLOPIDOGREL BISULFATE 75 MG/1
1 TABLET, FILM COATED ORAL
Qty: 30 | Refills: 0 | OUTPATIENT
Start: 2018-09-18 | End: 2018-10-17

## 2018-09-18 RX ORDER — ASPIRIN/CALCIUM CARB/MAGNESIUM 324 MG
1 TABLET ORAL
Qty: 30 | Refills: 0 | OUTPATIENT
Start: 2018-09-18 | End: 2018-10-17

## 2018-09-18 RX ADMIN — Medication 100 MILLIGRAM(S): at 21:34

## 2018-09-18 RX ADMIN — SENNA PLUS 2 TABLET(S): 8.6 TABLET ORAL at 21:34

## 2018-09-18 RX ADMIN — ENOXAPARIN SODIUM 40 MILLIGRAM(S): 100 INJECTION SUBCUTANEOUS at 12:46

## 2018-09-18 RX ADMIN — Medication 81 MILLIGRAM(S): at 12:46

## 2018-09-18 RX ADMIN — CLOPIDOGREL BISULFATE 75 MILLIGRAM(S): 75 TABLET, FILM COATED ORAL at 12:46

## 2018-09-18 RX ADMIN — DONEPEZIL HYDROCHLORIDE 5 MILLIGRAM(S): 10 TABLET, FILM COATED ORAL at 21:34

## 2018-09-18 RX ADMIN — ATORVASTATIN CALCIUM 80 MILLIGRAM(S): 80 TABLET, FILM COATED ORAL at 21:34

## 2018-09-18 RX ADMIN — Medication 100 MILLIGRAM(S): at 13:26

## 2018-09-18 RX ADMIN — AMLODIPINE BESYLATE 10 MILLIGRAM(S): 2.5 TABLET ORAL at 21:34

## 2018-09-18 NOTE — PROGRESS NOTE ADULT - ASSESSMENT
83 year-old with a PMH of HTN admitted for bilateral cerebellar CVA secondary to large vessel disease. He is admitted to acute rehabilitation for gait instability, ADL impairments, cognitive, safety, and functional impairments.    1. Bilateral cerebellar CVA - Continue 3 hours of comprehensive therapy consisting of PT/OT/SLP. Fall and cardiac precautions.    2. Secondary CVA PPx - Continue ASA, Atorvastatin, and Plavix    3. Cognitive impairments (impaired comprehension, recall, carry-over, poor safety) - Improving with constant cueing, Continue Donepezil 5mg QHS (started 9/13)    3. HTN - BP this morning 106/62 and repeat 130s/80s. Pt denies new sx or concerns. Continue with Norvasc 10mg  QHS and  HCTZ 25 qd with holding parameters. Continue to monitor vitals    4. DVT PPx - Lovenox    8. Disposition - Discussed at interdisciplinary team meeting on 9/13 - tolerating therapy well but limited by poor comprehension, carry-over, and recall. Currently functioning at min assist with ADL's, ambulating 200' with supervision and climbing 12 stairs with supervision, With SLP, currently a Mod/Max assist level. Will need supervision at home upon discharge, will speak with family to discuss future discharge plans. Estimated date of discharge on 9/19/18.

## 2018-09-18 NOTE — DISCHARGE NOTE ADULT - PLAN OF CARE
Optimize medical recovery Patient will continue with medications and follow up with neurology in 1-2 weeks upon discharge Patient will continue with medications and follow up with primary care doctor in 1-2 weeks upon discharge Patient will continue with therapy as an outpatient and follow up with physiatry in 4 weeks upon discharge

## 2018-09-18 NOTE — PROGRESS NOTE ADULT - ATTENDING COMMENTS
Pt. seen.  Agree with documentation above as per fellow. Patient medically stable. BP elevated in AM but came down to 113 SBP when sitting in WC.  Check orthostatics.  Hospitalist increased amlodipine.  will monitor.    Cognitive/memory deficits affecting carry-over--would benefit from trial of donepezil.
Pt. seen.  Agree with documentation above as per fellow. Patient medically stable. added HCTZ for elevated BP as per hospitalist recs.  SBP goal 120-150
Pt. seen.  Agree with documentation above as per fellow. Patient medically stable. BP elevated in AM consistently but better in PM. Change BP meds--Amlodipine at bedtime to improve AM BP, Increase HCTZ in AM.      Check orthostatics.  d/w Hospitalist.  will monitor.    Cognitive/memory deficits affecting carry-over--cont.  donepezil.
Pt. seen.  Agree with documentation above as per resident with amendments made as appropriate. Patient medically stable. Making progress towards rehab goals. BP controlled
Pt. seen.  Agree with documentation above as per resident. Patient medically stable.     BP controlled.

## 2018-09-18 NOTE — PROGRESS NOTE ADULT - SUBJECTIVE AND OBJECTIVE BOX
S:  Interval history:  No acute events overnight. This morning (6am) HCTZ held for . Repeat pressure at 8:30am stable (130/80). Patient has no complaints. Sleeping well. Denies pain. Per nursing last BM 9/17, urinating appropriately. Denies dizziness, light headedness, CP, palpitations, SOB.  O:  T(C): 37 (09-17-18 @ 20:19)  T(F): 98.6 (09-17-18 @ 20:19), Max: 98.6 (09-17-18 @ 20:19)  HR: 68 (09-18-18 @ 08:44) (66 - 68)  BP: 130/80 (09-18-18 @ 08:44) (106/62 - 145/78)  RR:  (13 - 14)  SpO2:  (95% - 97%)  PE:  Gen: well-appearing, laying in bed  Neuro: CN: grossly intact  CV: RRR, S1, S2, no rubs, murmurs, gallops  Resp: breathing comfortably, CTA b/l  Abd: soft, NT/ND, normoactive BS throughout  Ext: no LE edema or tenderness to palpation.    Labs:  Routine labs - 9/20    -Functional status (9/13):   -Ambulation - Current: 200ft w/ supervision/contact guard w/o AD. Goal: Mod-I  -Transfers - Current: supervision. Goal: supervision  -ADL's - Current: supervision to min assist. Goal: Mod-I to supervision.

## 2018-09-18 NOTE — DISCHARGE NOTE ADULT - INSTRUCTIONS
Mechanical soft diet Mechanical soft diet    Needs supervision with all activities,  Cont. therapy with home care

## 2018-09-18 NOTE — DISCHARGE NOTE ADULT - MEDICATION SUMMARY - MEDICATIONS TO TAKE
I will START or STAY ON the medications listed below when I get home from the hospital:    aspirin 81 mg oral delayed release tablet  -- 1 tab(s) by mouth once a day  -- Indication: For Cerebral infarction    atorvastatin 80 mg oral tablet  -- 1 tab(s) by mouth once a day (at bedtime)  -- Indication: For Cerebral infarction    clopidogrel 75 mg oral tablet  -- 1 tab(s) by mouth once a day  -- Indication: For Cerebral infarction    amLODIPine 10 mg oral tablet  -- 1 tab(s) by mouth once a day (at bedtime)  -- Indication: For Essential hypertension    donepezil 5 mg oral tablet  -- 1 tab(s) by mouth once a day (at bedtime)  -- Indication: For Cognitive deficit after stroke    hydroCHLOROthiazide 25 mg oral tablet  -- 1 tab(s) by mouth once a day  -- Indication: For Essential hypertension

## 2018-09-18 NOTE — DISCHARGE NOTE ADULT - CARE PROVIDER_API CALL
Mohan Turk), Neurology; Vascular Neurology  [address]  [place] [address]  Phone: [phone]  Fax: [phone]    Primary Care Doctor,   Primary Care Doctor  Phone: (   )    -  Fax: (   )    -    Ivis Lozada (DO), Brain Injury Medicine; PhysicalRehab Medicine  [address] [place] [address]  Phone: [phone]  Fax: [phone]

## 2018-09-18 NOTE — DISCHARGE NOTE ADULT - NS AS ACTIVITY OBS
Activity allowed as recommended by physician and therapist/Walking-Indoors allowed/Do not drive or operate machinery/No Heavy lifting/straining/Walking-Outdoors allowed Stairs allowed/Activity allowed as recommended by physician and therapist/Walking-Outdoors allowed/Walking-Indoors allowed/No Heavy lifting/straining/Do not drive or operate machinery/Sex allowed/Do not make important decisions

## 2018-09-18 NOTE — DISCHARGE NOTE ADULT - CARE PROVIDERS DIRECT ADDRESSES
[email or web address]

## 2018-09-18 NOTE — DISCHARGE NOTE ADULT - HOSPITAL COURSE
82yo left hand dominant M with multiple vascular risk factors, including age and HTN admitted to [facility] on 9/3/18 for acute onset of dysarthria. He was subsequently transferred to [facility] for further management of "CTA findings." Patient dx with acute bilateral inferior cerebellar hemisphere infarcts based on MRI.     CTH on 9/3 did not show any evidence of acute infarct or hemorrhage. 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 MRI brain on 9/4 showed bilateral PICA distribution and left PCA distribution punctate infarcts and severe leukoaraiosis. TTE did not show any obvious structural cardiac source of embolism nor showed any evidence of a PFO. 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 symptomatic intracranial atherosclerosis. ASA and Plavix for second stroke prevention as per SAMMPRAS. TTE: EF 75%, mild MR, minimal AR, mild diastolic dysfunction (Stage I), cardiac monitoring without any acute events. Dysphagia screen - passed, tolerating regular diet. Patient medically optimized for dc to acute rehab 9/10/18.    During hospitalization in [facility]'s acute IPR program, patient made functional gains and had cognitive improvement. Patient's HTN was controlled with HCTZ QD and amlodipine QHS. Donepezil was started for patient's mild cognitive impairment. Patient was continued on secondary CVA ppx with ASA, clopidogrel, and statin. 84yo left hand dominant M with multiple vascular risk factors, including age and HTN admitted to [facility] on 9/3/18 for acute onset of dysarthria. He was subsequently transferred to [facility] for further management of "CTA findings." Patient dx with acute bilateral inferior cerebellar hemisphere infarcts based on MRI.     CTH on 9/3 did not show any evidence of acute infarct or hemorrhage. 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 MRI brain on 9/4 showed bilateral PICA distribution and left PCA distribution punctate infarcts and severe leukoaraiosis. TTE did not show any obvious structural cardiac source of embolism nor showed any evidence of a PFO. 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 symptomatic intracranial atherosclerosis. ASA and Plavix for second stroke prevention as per SAMMPRAS. TTE: EF 75%, mild MR, minimal AR, mild diastolic dysfunction (Stage I), cardiac monitoring without any acute events. Dysphagia screen - passed, tolerating regular diet. Patient medically optimized for dc to acute rehab 9/10/18.    During hospitalization in [facility]'s acute IPR program, patient made functional gains and had cognitive improvement. Patient's HTN was controlled with HCTZ QD and amlodipine QHS. Donepezil was started for patient's mild cognitive impairment. Patient was continued on secondary CVA ppx with ASA, clopidogrel, and statin. Patient will follow up with neurology upon discharge. At time of discharge, patient was tolerating therapy well but was limited by poor comprehension, carry-over and recall. Patient was functioning at min assist with ADLs, ambulating 200 feet with supervision and climbing 12 stairs with supervision. With SLP, patient was at a max/mod assist level. Patient will need supervision at home upon discharge. Patient is medically stable for discharge home.

## 2018-09-18 NOTE — PROGRESS NOTE ADULT - SUBJECTIVE AND OBJECTIVE BOX
HPI:  84yo left hand dominant M with multiple vascular risk factors, including age and HTN admitted to [facility] on 9/3/18 for acute onset of dysarthria. He was subsequently transferred to [facility] for further management of "CTA findings." Patient dx with acute bilateral inferior cerebellar hemisphere infarcts based on MRI.     CT brain on 9/3 did not show any evidence of acute infarct or hemorrhage. 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 MRI brain on 9/4 showed bilateral PICA distribution and left PCA distribution punctate infarcts and severe leukoaraiosis. TTE did not show any obvious structural cardiac source of embolism nor showed any evidence of a PFO. 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 symptomatic intracranial atherosclerosis. ASA and Plavix for second stroke prevention as per SAMMPRAS. TTE: EF 75%, mild MR, minimal AR, mild diastolic dysfunction (Stage I), cardiac monitoring without any acute events. Dysphagia screen - passed, tolerating regular diet. Patient medically optimized for dc to acute rehab 9/10/18.    LDL - 114  HbA1c - 5.6 (10 Sep 2018 15:43)    Subjective: Pt reports that he is feeling well. Denies new sx or concerns. Reports that he slept well.     REVIEW OF SYMPTOMS  Pertinent in the last 24hrs: Neurological deficits    VITALS  Vital Signs Last 24 Hrs  T(C): 37 (17 Sep 2018 20:19), Max: 37 (17 Sep 2018 20:19)  T(F): 98.6 (17 Sep 2018 20:19), Max: 98.6 (17 Sep 2018 20:19)  HR: 68 (18 Sep 2018 08:44) (66 - 68)  BP: 130/80 (18 Sep 2018 08:44) (106/62 - 145/78)  BP(mean): --  RR: 13 (18 Sep 2018 08:44) (13 - 14)  SpO2: 97% (18 Sep 2018 08:44) (95% - 97%)      PHYSICAL EXAM  Gen - NAD, Comfortable  HEENT - NCAT, Cataract in L eye and minimally reactive, R pupil sluggish  Neck - Supple, No limited ROM  Pulm - CTAB, No wheeze, No rhonchi, No crackles  Cardiovascular - RRR, S1S2, No murmurs  Abdomen - Soft, NT/ND, +BS  Extremities - No C/C/E, No calf tenderness  Neuro-  	   Cognitive - AAOx3 person, place, month and year, not to date. Impaired comprehension, recall, carryover, and insight (improving)  	   Communication - Fluent, No dysarthria  	   Cranial Nerves - CN 2-12 grossly intact, unable to assess visual fields 2/2 patient unable to keep eyes focused on examiner.   	   Motor -   	                  LEFT    UE - ShAB 5/5, EF 5/5, EE 5/5, WE 5/5,  5/5  	                  RIGHT UE - ShAB 5/5, EF 5/5, EE 5/5, WE 5/5,  5/5  	                  LEFT    LE - HF 5/5, KE 5/5, DF 5/5, PF 5/5, EHL 5/5  	                  RIGHT LE - HF 5/5, KE 5/5, DF 5/5, PF 5/5, EHL 5/5  	   Sensory - Intact to LT B/L  	   Tone - normal  	   Coordination - FTN mildly impaired left > right; HTS intact B/L  Psychiatric - Mood stable, Affect WNL    RECENT LABS                        12.6   8.0   )-----------( 218      ( 17 Sep 2018 05:40 )             38.1     09-17    136  |  101  |  14  ----------------------------<  97  3.3<L>   |  26  |  1.04    Ca    9.4      17 Sep 2018 05:40              RADIOLOGY/OTHER RESULTS      MEDICATIONS  (STANDING):  amLODIPine   Tablet 10 milliGRAM(s) Oral at bedtime  aspirin enteric coated 81 milliGRAM(s) Oral daily  atorvastatin 80 milliGRAM(s) Oral at bedtime  clopidogrel Tablet 75 milliGRAM(s) Oral daily  docusate sodium 100 milliGRAM(s) Oral three times a day  donepezil 5 milliGRAM(s) Oral at bedtime  enoxaparin Injectable 40 milliGRAM(s) SubCutaneous daily  hydrochlorothiazide 25 milliGRAM(s) Oral daily  senna 2 Tablet(s) Oral at bedtime    MEDICATIONS  (PRN):

## 2018-09-18 NOTE — DISCHARGE NOTE ADULT - PROVIDER TOKENS
TOKEN:'[id number]',FREE:[LAST:[Primary Care Doctor],PHONE:[(   )    -],FAX:[(   )    -],ADDRESS:[Primary Care Doctor]],TOKEN:'[id number]'

## 2018-09-18 NOTE — PROGRESS NOTE ADULT - SUBJECTIVE AND OBJECTIVE BOX
MELONY VITAL  83y  Male    Patient is a 83y old  Male who presents with a chief complaint of CVA (17 Sep 2018 13:20)    Pt seen and examined. he feels well, no complaints     PAST MEDICAL & SURGICAL HISTORY:  HTN (hypertension)  No significant past surgical history        MedsMEDICATIONS  (STANDING):  amLODIPine   Tablet 10 milliGRAM(s) Oral at bedtime  aspirin enteric coated 81 milliGRAM(s) Oral daily  atorvastatin 80 milliGRAM(s) Oral at bedtime  clopidogrel Tablet 75 milliGRAM(s) Oral daily  docusate sodium 100 milliGRAM(s) Oral three times a day  donepezil 5 milliGRAM(s) Oral at bedtime  enoxaparin Injectable 40 milliGRAM(s) SubCutaneous daily  hydrochlorothiazide 25 milliGRAM(s) Oral daily  senna 2 Tablet(s) Oral at bedtime    MEDICATIONS  (PRN):      Vital Signs Last 24 Hrs  T(C): 37 (17 Sep 2018 20:19), Max: 37 (17 Sep 2018 20:19)  T(F): 98.6 (17 Sep 2018 20:19), Max: 98.6 (17 Sep 2018 20:19)  HR: 68 (18 Sep 2018 06:05) (66 - 68)  BP: 106/62 (18 Sep 2018 06:05) (106/62 - 145/78)  BP(mean): --  RR: 14 (18 Sep 2018 06:05) (14 - 14)  SpO2: 95% (18 Sep 2018 06:05) (95% - 97%)    PHYSICAL EXAM:  GENERAL: NAD  HEAD:  NC/AT  EYES: EOMI, PERRLA, conjunctiva and sclera clear  NERVOUS SYSTEM:  Alert & Oriented X3  CHEST/LUNG: Clear lungs b/l  HEART: S1S2, RRR   ABDOMEN: Soft, non-tender, non-distended, + bowel sounds  EXTREMITIES:  2+ Peripheral Pulses, No clubbing, cyanosis, or edema  SKIN: No rashes or lesions    LABS:                          12.6   8.0   )-----------( 218      ( 17 Sep 2018 05:40 )             38.1       09-17    136  |  101  |  14  ----------------------------<  97  3.3<L>   |  26  |  1.04    Ca    9.4      17 Sep 2018 05:40                                      RADIOLOGY & ADDITIONAL TESTS:    Imaging Personally Reviewed:  [ ] YES  [ ] NO      HEALTH ISSUES - PROBLEM Dx:  Hypokalemia: Hypokalemia  Prophylactic measure: Prophylactic measure  Impaired mobility and ADLs: Impaired mobility and ADLs  Essential hypertension: Essential hypertension  Cerebrovascular accident (CVA) due to other mechanism: Cerebrovascular accident (CVA) due to other mechanism          Care Discussed with Consultants/Other Providers [ x] YES  [ ] NO

## 2018-09-18 NOTE — DISCHARGE NOTE ADULT - MEDICATION SUMMARY - MEDICATIONS TO CHANGE
I will SWITCH the dose or number of times a day I take the medications listed below when I get home from the hospital:    aspirin 325 mg oral tablet  -- 1 tab(s) by mouth once a day    amLODIPine 2.5 mg oral tablet  -- 1 tab(s) by mouth once a day

## 2018-09-18 NOTE — DISCHARGE NOTE ADULT - CARE PLAN
Principal Discharge DX:	Cerebrovascular accident (CVA) due to other mechanism  Goal:	Optimize medical recovery  Assessment and plan of treatment:	Patient will continue with medications and follow up with neurology in 1-2 weeks upon discharge  Secondary Diagnosis:	Essential hypertension  Goal:	Optimize medical recovery  Assessment and plan of treatment:	Patient will continue with medications and follow up with primary care doctor in 1-2 weeks upon discharge  Secondary Diagnosis:	Impaired mobility and ADLs  Goal:	Optimize medical recovery  Assessment and plan of treatment:	Patient will continue with therapy as an outpatient and follow up with physiatry in 4 weeks upon discharge

## 2018-09-18 NOTE — PROGRESS NOTE ADULT - ASSESSMENT
84yo LHD man with PMH of HTN admitted for bilateral cerebellar ischemic CVA with residual functional impairments, ADL impairments, ambulation impairments and speech/swallow impairments.    1. Bilateral cerebellar ischemic CVA - likely large vessel etiology  -secondary stroke prevention: continue ASA 81mg QD, clopidogrel 75mg QD, atorvastatin 80mg QD, stroke education  -PT/OT/SLP    2. HTN - On amlodipine 10mg QD QHS and HCTZ 25mg QD. BP low this morning, now acceptable.  -Hospitalist following - rec's appreciated  -continue amlodipine and HCTZ, hold parameters SBP <110  -check orthostatics: has not had orthostatic hypotension during admission  -continue to monitor for now    3. Cognition - mild cognitive impairment, memory impaired. Donepezil started 9/13. May inc to 10mg in 4-6wks.  -continue donepezil 5mg QHS    4. Dysphagia - evaluated by SLP 9/11  -tolerating diet  -continue with soft with thins    5. Electrolyte abnormalities:   -Hypokalemia may be 2/2 HCTZ - 9/17 K 3.3. Repleted KCl 40mg PO.   -Hyponatremia - 9/17 Na 136. Resolved.  -f/u routine labs: BMP 9/20    6. Thrombocytopenia - 9/17 Plt 218. Resolved  -routine labs: CBC 9/20    7. Constipation - 9/17 Last BM. 9/16 started colace 100mg TID, senna 2tab QD, miralax 17g PRN  -continue regimen    8. DVT ppx  -c/w enoxaparin 40mg SQ QD    9. D/C planning  -IDT rounds today: plan for 9/19 d/c to home with supervision. Barriers of safety awareness requiring supervision.  -speak with family regarding d/c with supervision 82yo LHD man with PMH of HTN admitted for bilateral cerebellar ischemic CVA with residual functional impairments, ADL impairments, ambulation impairments and speech/swallow impairments.    1. Bilateral cerebellar ischemic CVA - likely large vessel etiology  -secondary stroke prevention: continue ASA 81mg QD, clopidogrel 75mg QD, atorvastatin 80mg QD, stroke education  -PT/OT/SLP    2. HTN - On amlodipine 10mg QD QHS and HCTZ 25mg QD. BP low this morning, now acceptable.  -Hospitalist following - rec's appreciated  -continue amlodipine and HCTZ, hold parameters SBP <110  -check orthostatics: has not had orthostatic hypotension during admission  -continue to monitor for now    3. Cognition - mild cognitive impairment, memory impaired. Donepezil started 9/13. May inc to 10mg in 2-4 wks.  -continue donepezil 5mg QHS    4. Dysphagia - evaluated by SLP 9/11  -tolerating diet  -continue with soft with thins    5. Electrolyte abnormalities:   -Hypokalemia may be 2/2 HCTZ - 9/17 K 3.3. Repleted KCl 40mg PO.   -Hyponatremia - 9/17 Na 136. Resolved.  -f/u routine labs: BMP 9/20    6. Thrombocytopenia - 9/17 Plt 218. Resolved  -routine labs: CBC 9/20    7. Constipation - 9/17 Last BM. 9/16 started colace 100mg TID, senna 2tab QD, miralax 17g PRN  -continue regimen    8. DVT ppx  -c/w enoxaparin 40mg SQ QD    9. D/C planning  -IDT rounds today: plan for 9/19 d/c to home with supervision. Barriers of safety awareness requiring supervision.  -speak with family regarding d/c with supervision

## 2018-09-18 NOTE — DISCHARGE NOTE ADULT - PATIENT PORTAL LINK FT
You can access the [facility] Portal, offered by [facility], by registering with the following website: [email or web address]

## 2018-09-18 NOTE — DISCHARGE NOTE ADULT - ABILITY TO HEAR (WITH HEARING AID OR HEARING APPLIANCE IF NORMALLY USED):
no hearing aid/Mildly to Moderately Impaired: difficulty hearing in some environments or speaker may need to increase volume or speak distinctly

## 2018-09-19 VITALS — WEIGHT: 125.88 LBS

## 2018-09-19 PROCEDURE — 92523 SPEECH SOUND LANG COMPREHEN: CPT

## 2018-09-19 PROCEDURE — 99238 HOSP IP/OBS DSCHRG MGMT 30/<: CPT

## 2018-09-19 PROCEDURE — 92610 EVALUATE SWALLOWING FUNCTION: CPT

## 2018-09-19 PROCEDURE — 97535 SELF CARE MNGMENT TRAINING: CPT

## 2018-09-19 PROCEDURE — 80053 COMPREHEN METABOLIC PANEL: CPT

## 2018-09-19 PROCEDURE — 97110 THERAPEUTIC EXERCISES: CPT

## 2018-09-19 PROCEDURE — 92507 TX SP LANG VOICE COMM INDIV: CPT

## 2018-09-19 PROCEDURE — 93005 ELECTROCARDIOGRAM TRACING: CPT

## 2018-09-19 PROCEDURE — 97116 GAIT TRAINING THERAPY: CPT

## 2018-09-19 PROCEDURE — 97112 NEUROMUSCULAR REEDUCATION: CPT

## 2018-09-19 PROCEDURE — 85027 COMPLETE CBC AUTOMATED: CPT

## 2018-09-19 PROCEDURE — 80048 BASIC METABOLIC PNL TOTAL CA: CPT

## 2018-09-19 PROCEDURE — 97163 PT EVAL HIGH COMPLEX 45 MIN: CPT

## 2018-09-19 PROCEDURE — 97530 THERAPEUTIC ACTIVITIES: CPT

## 2018-09-19 PROCEDURE — 97167 OT EVAL HIGH COMPLEX 60 MIN: CPT

## 2018-09-19 PROCEDURE — 84134 ASSAY OF PREALBUMIN: CPT

## 2018-09-19 RX ORDER — AMLODIPINE BESYLATE 2.5 MG/1
1 TABLET ORAL
Qty: 30 | Refills: 0 | OUTPATIENT
Start: 2018-09-19 | End: 2018-10-18

## 2018-09-19 RX ORDER — CLOPIDOGREL BISULFATE 75 MG/1
1 TABLET, FILM COATED ORAL
Qty: 30 | Refills: 0 | OUTPATIENT
Start: 2018-09-19 | End: 2018-10-18

## 2018-09-19 RX ORDER — ATORVASTATIN CALCIUM 80 MG/1
1 TABLET, FILM COATED ORAL
Qty: 30 | Refills: 0 | OUTPATIENT
Start: 2018-09-19 | End: 2018-10-18

## 2018-09-19 RX ORDER — DONEPEZIL HYDROCHLORIDE 10 MG/1
1 TABLET, FILM COATED ORAL
Qty: 30 | Refills: 0 | OUTPATIENT
Start: 2018-09-19 | End: 2018-10-18

## 2018-09-19 RX ORDER — ASPIRIN/CALCIUM CARB/MAGNESIUM 324 MG
1 TABLET ORAL
Qty: 30 | Refills: 0 | OUTPATIENT
Start: 2018-09-19 | End: 2018-10-18

## 2018-09-19 RX ADMIN — Medication 81 MILLIGRAM(S): at 10:22

## 2018-09-19 RX ADMIN — CLOPIDOGREL BISULFATE 75 MILLIGRAM(S): 75 TABLET, FILM COATED ORAL at 10:22

## 2018-09-19 RX ADMIN — Medication 100 MILLIGRAM(S): at 05:57

## 2018-09-19 RX ADMIN — Medication 25 MILLIGRAM(S): at 05:57

## 2018-09-19 RX ADMIN — ENOXAPARIN SODIUM 40 MILLIGRAM(S): 100 INJECTION SUBCUTANEOUS at 10:22

## 2018-09-19 NOTE — PROGRESS NOTE ADULT - ASSESSMENT
83 year-old with a PMH of HTN admitted for bilateral cerebellar CVA secondary to large vessel disease. He is admitted to acute rehabilitation for gait instability, ADL impairments, cognitive, safety, and functional impairments.    Medically stable for discharge home today.  Will review discharge medications and instructions with pt's family.       2. Secondary CVA PPx - Continue ASA, Atorvastatin, and Plavix    3. Cognitive impairments (impaired comprehension, recall, carry-over, poor safety) - Improving with constant cueing, Continue Donepezil 5mg QHS (started 9/13)    3. HTN - BP this morning 106/62 and repeat 130s/80s. Pt denies new sx or concerns. Continue with Norvasc 10mg  QHS and  HCTZ 25 qd with holding parameters. Continue to monitor vitals    4. DVT PPx - Lovenox    8. Disposition - Discussed at interdisciplinary team meeting on 9/13 - tolerating therapy well but limited by poor comprehension, carry-over, and recall. Currently functioning at min assist with ADL's, ambulating 200' with supervision and climbing 12 stairs with supervision, With SLP, currently a Mod/Max assist level. Will need supervision at home upon discharge, will speak with family to discuss future discharge plans. Estimated date of discharge on 9/19/18. 83 year-old with a PMH of HTN admitted for bilateral cerebellar CVA secondary to large vessel disease. He is admitted to acute rehabilitation for gait instability, ADL impairments, cognitive, safety, and functional impairments.    Medically stable for discharge home today.  Discharge medications and instructions reviewed with pt's son.  All questions answered.  Family training today       2. Secondary CVA PPx - Continue ASA, Atorvastatin, and Plavix    3. Cognitive impairments (impaired comprehension, recall, carry-over, poor safety) - Improving with constant cueing, Continue Donepezil 5mg QHS (started 9/13)    3. HTN - d/c on Norvasc 10mg  QHS and  HCTZ 25 qd with holding parameters. BP controlled        8. Disposition - Discussed at interdisciplinary team meeting on 9/13 - tolerating therapy well but limited by poor comprehension, carry-over, and recall. Currently functioning at min assist with ADL's, ambulating 200' with supervision and climbing 12 stairs with supervision, With SLP, currently a Mod/Max assist level. Will need supervision at home upon discharge, family aware and will provide

## 2018-09-19 NOTE — PROGRESS NOTE ADULT - SUBJECTIVE AND OBJECTIVE BOX
HPI:  84yo left hand dominant M with multiple vascular risk factors, including age and HTN admitted to [facility] on 9/3/18 for acute onset of dysarthria. He was subsequently transferred to [facility] for further management of "CTA findings." Patient dx with acute bilateral inferior cerebellar hemisphere infarcts based on MRI.     CT brain on 9/3 did not show any evidence of acute infarct or hemorrhage. 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 MRI brain on 9/4 showed bilateral PICA distribution and left PCA distribution punctate infarcts and severe leukoaraiosis. TTE did not show any obvious structural cardiac source of embolism nor showed any evidence of a PFO. 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 symptomatic intracranial atherosclerosis. ASA and Plavix for second stroke prevention as per SAMMPRAS. TTE: EF 75%, mild MR, minimal AR, mild diastolic dysfunction (Stage I), cardiac monitoring without any acute events. Dysphagia screen - passed, tolerating regular diet. Patient medically optimized for dc to acute rehab 9/10/18.    LDL - 114  HbA1c - 5.6 (10 Sep 2018 15:43)      PAST MEDICAL & SURGICAL HISTORY:  HTN (hypertension)  No significant past surgical history      Subjective:  no new issues, discharge today      REVIEW OF SYMPTOMS  Pertinent in the last 24hrs: Neurological deficits      VITALS  Vital Signs Last 24 Hrs  T(C): 36.4 (19 Sep 2018 07:28), Max: 36.9 (18 Sep 2018 19:49)  T(F): 97.6 (19 Sep 2018 07:28), Max: 98.5 (18 Sep 2018 19:49)  HR: 63 (19 Sep 2018 07:28) (63 - 75)  BP: 147/83 (19 Sep 2018 07:28) (134/80 - 147/83)  BP(mean): --  RR: 15 (19 Sep 2018 07:28) (14 - 15)  SpO2: 96% (19 Sep 2018 07:28) (96% - 96%)      PHYSICAL EXAM  Gen - NAD, Comfortable  HEENT - NCAT, Cataract in L eye and minimally reactive, R pupil sluggish  Neck - Supple, No limited ROM  Pulm - CTAB, No wheeze, No rhonchi, No crackles  Cardiovascular - RRR, S1S2, No murmurs  Abdomen - Soft, NT/ND, +BS  Extremities - No C/C/E, No calf tenderness  Neuro-  	   Cognitive - AAOx3 person, place, month and year, not to date. Impaired comprehension, recall, carryover, and insight (improving)  	   Communication - Fluent, No dysarthria  	   Cranial Nerves - CN 2-12 grossly intact, unable to assess visual fields 2/2 patient unable to keep eyes focused on examiner.   	   Motor -   	                  LEFT    UE - ShAB 5/5, EF 5/5, EE 5/5, WE 5/5,  5/5  	                  RIGHT UE - ShAB 5/5, EF 5/5, EE 5/5, WE 5/5,  5/5  	                  LEFT    LE - HF 5/5, KE 5/5, DF 5/5, PF 5/5, EHL 5/5  	                  RIGHT LE - HF 5/5, KE 5/5, DF 5/5, PF 5/5, EHL 5/5  	   Sensory - Intact to LT B/L  	   Tone - normal  	   Coordination - FTN mildly impaired left > right; HTS intact B/L  Psychiatric - Mood stable, Affect WNL    RECENT LABS                  RADIOLOGY/OTHER RESULTS      MEDICATIONS  (STANDING):  amLODIPine   Tablet 10 milliGRAM(s) Oral at bedtime  aspirin enteric coated 81 milliGRAM(s) Oral daily  atorvastatin 80 milliGRAM(s) Oral at bedtime  clopidogrel Tablet 75 milliGRAM(s) Oral daily  docusate sodium 100 milliGRAM(s) Oral three times a day  donepezil 5 milliGRAM(s) Oral at bedtime  enoxaparin Injectable 40 milliGRAM(s) SubCutaneous daily  hydrochlorothiazide 25 milliGRAM(s) Oral daily  senna 2 Tablet(s) Oral at bedtime    MEDICATIONS  (PRN):

## 2018-09-19 NOTE — PROGRESS NOTE ADULT - PROVIDER SPECIALTY LIST ADULT
Hospitalist
Neurology
Neurology
Physiatry
Rehab Medicine

## 2018-10-01 ENCOUNTER — APPOINTMENT (OUTPATIENT)
Dept: NEUROLOGY | Facility: CLINIC | Age: 83
End: 2018-10-01

## 2018-10-08 ENCOUNTER — APPOINTMENT (OUTPATIENT)
Dept: NEUROLOGY | Facility: CLINIC | Age: 83
End: 2018-10-08

## 2018-10-25 ENCOUNTER — APPOINTMENT (OUTPATIENT)
Dept: PHYSICAL MEDICINE AND REHAB | Facility: CLINIC | Age: 83
End: 2018-10-25
Payer: COMMERCIAL

## 2018-10-25 VITALS
BODY MASS INDEX: 23.39 KG/M2 | HEIGHT: 67 IN | DIASTOLIC BLOOD PRESSURE: 78 MMHG | SYSTOLIC BLOOD PRESSURE: 144 MMHG | WEIGHT: 149 LBS | HEART RATE: 71 BPM | OXYGEN SATURATION: 97 %

## 2018-10-25 PROCEDURE — 99213 OFFICE O/P EST LOW 20 MIN: CPT | Mod: GC

## 2018-10-25 RX ORDER — TAMSULOSIN HYDROCHLORIDE 0.4 MG/1
0.4 CAPSULE ORAL
Qty: 90 | Refills: 0 | Status: DISCONTINUED | COMMUNITY
Start: 2018-08-16

## 2018-10-25 RX ORDER — DONEPEZIL HYDROCHLORIDE 5 MG/1
5 TABLET ORAL
Qty: 30 | Refills: 0 | Status: DISCONTINUED | COMMUNITY
Start: 2018-09-19

## 2018-10-25 RX ORDER — ATENOLOL 50 MG/1
50 TABLET ORAL
Qty: 90 | Refills: 0 | Status: DISCONTINUED | COMMUNITY
Start: 2018-05-24

## 2018-12-17 ENCOUNTER — APPOINTMENT (OUTPATIENT)
Dept: NEUROLOGY | Facility: CLINIC | Age: 83
End: 2018-12-17

## 2019-05-09 NOTE — ED ADULT NURSE NOTE - NEURO MENTATION
Hpi Title: Evaluation of Skin Lesions How Severe Are Your Spot(S)?: mild Additional History: Lesions on right shoulder, left calf,  forehead, and dry and itchy scalp. confused

## 2020-02-03 NOTE — PHYSICAL THERAPY INITIAL EVALUATION ADULT - ADDITIONAL COMMENTS
Group Topic:  Behavioral Activation Group    Date: 2/3/2020  Start Time: 11:00 AM  End Time: 12:00 PM  Facilitators: Cullen Buck; Celestino Murphy LPC    Focus: Goals  Number in attendance: 12    Licensed Provider Directing Treatment: Celestino Murphy LPC    Documentation Completed By (Under Supervision of Licensed Provider):   Cullen Buck, Counseling Intern    I was present and agree with the content of the note.   Celestino Murphy LPC    Attendance: Present  Patient Response: Appropriate feedback, Attentive, Hyperverbal, and Good eye contact    Pt's goals are to focus on being mindful of her thoughts, with emphasis on not blocking or suppressing her thoughts as well as reminding herself that thoughts are just thoughts, avoid ruminating as well as isolating herself, tidy up her kitchen, and cook.        Pt lives in a house with his daughter with 2 steps to enter and 12-13 steps inside, +HR. Pt was independent with all ADLs and ambulation PTA. Pt does not use a AD for ambulation.

## 2020-10-14 NOTE — PROVIDER CONTACT NOTE (OTHER) - ASSESSMENT
Nasal Root Units: 0 Show Glabellar Units: Yes Post-Care Instructions: Patient instructed to not lie down for 4 hours and limit physical activity for 24 hours. Patient instructed not to travel by airplane for 48 hours. Show Ucl Units: No Consent: Written consent obtained. Risks include but not limited to lid/brow ptosis, bruising, swelling, diplopia, temporary effect, incomplete chemical denervation. Pt resting comfortably in bed, other vital signs within normal limits. Detail Level: Detailed Expiration Date (Month Year): 03/2022 Forehead Units: 6 Periorbital Skin Units: 10 Lot #: [id number] Price (Use Numbers Only, No Special Characters Or $): 541 Glabellar Complex Units: 12 Dilution (U/0.1 Cc): 2.6

## 2021-02-22 NOTE — PROGRESS NOTE ADULT - ASSESSMENT
83 year old AA man with multiple vascular risk factors, including age and HTN is evaluated at [facility] for acute onset of dysarthria. On 9/3, he presented to [facility] for acute onset of dysarthria and was subsequently transferred to [facility] for further management of "CTA findings". CT brain on 9/3 did not show any evidence of acute infarct or hemorrhage. 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 MRI brain on 9/4 showed bilateral PICA distribution and left PCA distribution punctate infarcts and severe leukoaraiosis. TTE did not show any obvious structural cardiac source of embolism nor showed any evidence of a PFO.    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 symptomatic intracranial atherosclerosis.    NEURO: Neurologically without acute change/improvement in neurological examination, continue monitoring for neurologic deterioration, permissive HTN to gradual normotension over the next few weeks, atorvastatin 80 mg at bedtime considering likely atheroembolic etiology of his stroke, MRI Brain w/o, MRA Head w/o and Neck w/contrast noted above. Physical therapy/OT recommended AR.     ANTITHROMBOTIC THERAPY: ASA and Plavix for second stroke prevention as per SAMMPRIS    PULMONARY: CXR (09/03)- mild pulmonary vascular congestion, protecting airway, saturating well     CARDIOVASCULAR: TTE: EF 75%, mild MR, minimal AR, mild diastolic dysfunction (Stage I), cardiac monitoring without any acute events.                         SBP goal: Permissive HTN followed by gradual normotension    GASTROINTESTINAL: dysphagia screen - passed, tolerating diet       Diet: Pureed    RENAL: BUN/Cr within normal limits, good urine output- monitor minor hyponatremia      Na Goal: Greater than 135     Moody: N    HEMATOLOGY: H/H without acute change, Platelets 149- monitor mild thrombocytopenia      DVT ppx: Lovenox subq/Venodynes    ID: afebrile, no leukocytosis, no signs or symptoms of infection     DISPOSITION: Acute rehab    CORE MEASURES:        Admission NIHSS: 8     TPA: [] YES [X] NO      LDL/HDL: 114/34     Depression Screen: 0     Statin Therapy: Y     Dysphagia Screen: [X] PASS [] FAIL     Smoking [] YES [X] NO      Afib [] YES [X] NO     Stroke Education [x] YES [] NO Continue home Lipitor 20 mg qhs Continue home Lipitor 20 mg qhs Continue home Lipitor 20 mg qhs

## 2022-02-04 NOTE — DISCHARGE NOTE ADULT - PHYSICIAN SECTION COMPLETE
Yes Cyclophosphamide Counseling:  I discussed with the patient the risks of cyclophosphamide including but not limited to hair loss, hormonal abnormalities, decreased fertility, abdominal pain, diarrhea, nausea and vomiting, bone marrow suppression and infection. The patient understands that monitoring is required while taking this medication.

## 2022-02-09 NOTE — H&P ADULT - DOES THIS PATIENT HAVE A HISTORY OF OR HAS BEEN DX WITH HEART FAILURE?
POD#1. PT seen resting in bed. Pt c/o incisional site soreness. PT has numbness of b/l feet worse on the right foot present pre-op. Pt denies lower extremities pain at this time. Malhotra and PCA intact. She states since surgery she did not use PCA.     PE  Gen appearance: NAD  motor strength: 5/5 of b/l HF, quads, ant tibs, gastrocs, and EHL  Sensation: Numbness of b/l feet on the dorsal aspect of b/l feet.   no calf tenderness bilat.  Incisional site: clean and dry dressing. Drain: 205cc last shift: 80cc    Plan  Afeb, BP:102/51  WBC:14.00, H/H:9.7/30.1  Drain kept  Mobilize with physical therapy and encouraged incentive spirometer.  LSO brace at bedside.   DC malhotra and PCA. Transition to Oxycodone 5mg Q4H for mild pain, Oxycodone 10mg Q4H for mod pain. Dilaudid 0.5mg SubQ Q4H for severe pain. IV tylenol ATC already ordered.    no

## 2022-06-11 ENCOUNTER — EMERGENCY (EMERGENCY)
Facility: HOSPITAL | Age: 87
LOS: 1 days | Discharge: HOME | End: 2022-06-11
Attending: EMERGENCY MEDICINE | Admitting: EMERGENCY MEDICINE
Payer: MEDICAID

## 2022-06-11 VITALS
DIASTOLIC BLOOD PRESSURE: 77 MMHG | TEMPERATURE: 98 F | HEART RATE: 64 BPM | RESPIRATION RATE: 18 BRPM | OXYGEN SATURATION: 100 % | HEIGHT: 67 IN | SYSTOLIC BLOOD PRESSURE: 163 MMHG

## 2022-06-11 LAB
ALBUMIN SERPL ELPH-MCNC: 3.7 G/DL — SIGNIFICANT CHANGE UP (ref 3.3–5)
ALP SERPL-CCNC: 80 U/L — SIGNIFICANT CHANGE UP (ref 40–120)
ALT FLD-CCNC: 23 U/L — SIGNIFICANT CHANGE UP (ref 4–41)
ANION GAP SERPL CALC-SCNC: 10 MMOL/L — SIGNIFICANT CHANGE UP (ref 7–14)
APPEARANCE UR: CLEAR — SIGNIFICANT CHANGE UP
AST SERPL-CCNC: 19 U/L — SIGNIFICANT CHANGE UP (ref 4–40)
BASOPHILS # BLD AUTO: 0.04 K/UL — SIGNIFICANT CHANGE UP (ref 0–0.2)
BASOPHILS NFR BLD AUTO: 0.7 % — SIGNIFICANT CHANGE UP (ref 0–2)
BILIRUB SERPL-MCNC: 0.3 MG/DL — SIGNIFICANT CHANGE UP (ref 0.2–1.2)
BILIRUB UR-MCNC: NEGATIVE — SIGNIFICANT CHANGE UP
BUN SERPL-MCNC: 13 MG/DL — SIGNIFICANT CHANGE UP (ref 7–23)
CALCIUM SERPL-MCNC: 8.9 MG/DL — SIGNIFICANT CHANGE UP (ref 8.4–10.5)
CHLORIDE SERPL-SCNC: 107 MMOL/L — SIGNIFICANT CHANGE UP (ref 98–107)
CO2 SERPL-SCNC: 26 MMOL/L — SIGNIFICANT CHANGE UP (ref 22–31)
COLOR SPEC: YELLOW — SIGNIFICANT CHANGE UP
CREAT SERPL-MCNC: 1.18 MG/DL — SIGNIFICANT CHANGE UP (ref 0.5–1.3)
DIFF PNL FLD: NEGATIVE — SIGNIFICANT CHANGE UP
EGFR: 60 ML/MIN/1.73M2 — SIGNIFICANT CHANGE UP
EOSINOPHIL # BLD AUTO: 0.16 K/UL — SIGNIFICANT CHANGE UP (ref 0–0.5)
EOSINOPHIL NFR BLD AUTO: 2.7 % — SIGNIFICANT CHANGE UP (ref 0–6)
GLUCOSE SERPL-MCNC: 102 MG/DL — HIGH (ref 70–99)
GLUCOSE UR QL: NEGATIVE — SIGNIFICANT CHANGE UP
HCT VFR BLD CALC: 39 % — SIGNIFICANT CHANGE UP (ref 39–50)
HGB BLD-MCNC: 12.3 G/DL — LOW (ref 13–17)
IANC: 2.13 K/UL — SIGNIFICANT CHANGE UP (ref 1.8–7.4)
IMM GRANULOCYTES NFR BLD AUTO: 0.2 % — SIGNIFICANT CHANGE UP (ref 0–1.5)
KETONES UR-MCNC: NEGATIVE — SIGNIFICANT CHANGE UP
LEUKOCYTE ESTERASE UR-ACNC: NEGATIVE — SIGNIFICANT CHANGE UP
LYMPHOCYTES # BLD AUTO: 3.12 K/UL — SIGNIFICANT CHANGE UP (ref 1–3.3)
LYMPHOCYTES # BLD AUTO: 51.9 % — HIGH (ref 13–44)
MAGNESIUM SERPL-MCNC: 2.1 MG/DL — SIGNIFICANT CHANGE UP (ref 1.6–2.6)
MCHC RBC-ENTMCNC: 27.8 PG — SIGNIFICANT CHANGE UP (ref 27–34)
MCHC RBC-ENTMCNC: 31.5 GM/DL — LOW (ref 32–36)
MCV RBC AUTO: 88 FL — SIGNIFICANT CHANGE UP (ref 80–100)
MONOCYTES # BLD AUTO: 0.55 K/UL — SIGNIFICANT CHANGE UP (ref 0–0.9)
MONOCYTES NFR BLD AUTO: 9.2 % — SIGNIFICANT CHANGE UP (ref 2–14)
NEUTROPHILS # BLD AUTO: 2.13 K/UL — SIGNIFICANT CHANGE UP (ref 1.8–7.4)
NEUTROPHILS NFR BLD AUTO: 35.3 % — LOW (ref 43–77)
NITRITE UR-MCNC: NEGATIVE — SIGNIFICANT CHANGE UP
NRBC # BLD: 0 /100 WBCS — SIGNIFICANT CHANGE UP
NRBC # FLD: 0 K/UL — SIGNIFICANT CHANGE UP
PH UR: 6 — SIGNIFICANT CHANGE UP (ref 5–8)
PHOSPHATE SERPL-MCNC: 3.2 MG/DL — SIGNIFICANT CHANGE UP (ref 2.5–4.5)
PLATELET # BLD AUTO: 164 K/UL — SIGNIFICANT CHANGE UP (ref 150–400)
POTASSIUM SERPL-MCNC: 4.1 MMOL/L — SIGNIFICANT CHANGE UP (ref 3.5–5.3)
POTASSIUM SERPL-SCNC: 4.1 MMOL/L — SIGNIFICANT CHANGE UP (ref 3.5–5.3)
PROT SERPL-MCNC: 7.2 G/DL — SIGNIFICANT CHANGE UP (ref 6–8.3)
PROT UR-MCNC: NEGATIVE — SIGNIFICANT CHANGE UP
RBC # BLD: 4.43 M/UL — SIGNIFICANT CHANGE UP (ref 4.2–5.8)
RBC # FLD: 14.2 % — SIGNIFICANT CHANGE UP (ref 10.3–14.5)
SODIUM SERPL-SCNC: 143 MMOL/L — SIGNIFICANT CHANGE UP (ref 135–145)
SP GR SPEC: 1.01 — SIGNIFICANT CHANGE UP (ref 1–1.05)
UROBILINOGEN FLD QL: SIGNIFICANT CHANGE UP
WBC # BLD: 6.01 K/UL — SIGNIFICANT CHANGE UP (ref 3.8–10.5)
WBC # FLD AUTO: 6.01 K/UL — SIGNIFICANT CHANGE UP (ref 3.8–10.5)

## 2022-06-11 PROCEDURE — 70450 CT HEAD/BRAIN W/O DYE: CPT | Mod: 26,MA

## 2022-06-11 PROCEDURE — 99285 EMERGENCY DEPT VISIT HI MDM: CPT

## 2022-06-11 RX ORDER — AMLODIPINE BESYLATE 2.5 MG/1
10 TABLET ORAL ONCE
Refills: 0 | Status: COMPLETED | OUTPATIENT
Start: 2022-06-11 | End: 2022-06-11

## 2022-06-11 RX ADMIN — AMLODIPINE BESYLATE 10 MILLIGRAM(S): 2.5 TABLET ORAL at 22:16

## 2022-06-11 NOTE — ED ADULT TRIAGE NOTE - CHIEF COMPLAINT QUOTE
As per Daughter "since 6am he gait is very unsteady, . ...he is dragging rt leg....he was not like this yesterday today he needs to hold on to walk. " Pt denies  Rt sided weakness, Pt st I think it is my knee...it hurts." Pt tree hand grsp = strength, lower ext = strength. Jhonny to triage for eval

## 2022-06-11 NOTE — ED ADULT NURSE NOTE - OBJECTIVE STATEMENT
Pt arrives to ED rm 11 A&Ox4 and ambulatory daughter states that the pt was weak this morning with an altered gate by dragging right foot and right sided slumping. PMHx stroke with no residual weakness. Pt denies any weakness and was able to ambulate well without any assistance. Pt states that his BP has been very high lately despite taking his medications. BUE and BLE strong, no neuro deficits present, no facial droop, drooling or slurred speech noted, PERRLA. Pt denies any blurred vision, headache, SOB, chest pain, nausea, vomiting and diarrhea. 20G placed to L forearm, labs drawn and sent. Medicated as per MD order

## 2022-06-12 VITALS
RESPIRATION RATE: 16 BRPM | DIASTOLIC BLOOD PRESSURE: 92 MMHG | HEART RATE: 65 BPM | OXYGEN SATURATION: 100 % | SYSTOLIC BLOOD PRESSURE: 172 MMHG

## 2022-06-12 LAB
CULTURE RESULTS: SIGNIFICANT CHANGE UP
SPECIMEN SOURCE: SIGNIFICANT CHANGE UP

## 2022-06-12 NOTE — ED PROVIDER NOTE - OBJECTIVE STATEMENT
87M pmhx htn, prior CVA w no significant residual deficits presenting with CC of R sided gait preference noticed by daughter at around 6am, now resolved. Patient states that his BP was high during the day today but denies any complaints now. No cp or sob or ha. No recent f/c , cough, urinary symptoms. Walks without assistance at baseline.

## 2022-06-12 NOTE — ED PROVIDER NOTE - ATTENDING CONTRIBUTION TO CARE
87M pmhx htn, prior CVA w no significant residual deficits presenting with CC of R sided gait preference noticed by daughter at around 6am, now resolved. Patient states that his BP was high during the day today but denies any complaints now. No cp or sob or ha. No recent f/c , cough, urinary symptoms. Walks without assistance at baseline.  vortsman:Patient with complete resolution of symptoms possible mild drowsiness this a.m. with diffuse vague weakness mild possible dragging right lower extremity with altered mental status.  No slurred speech no impression weakness at baseline neurological status at this point.

## 2022-06-12 NOTE — ED PROVIDER NOTE - PATIENT PORTAL LINK FT
You can access the FollowMyHealth Patient Portal offered by [facility] by registering at the following website: [email or web address]. By joining [facility]’s FollowMyHealth portal, you will also be able to view your health information using other applications (apps) compatible with our system.

## 2022-06-12 NOTE — ED PROVIDER NOTE - NS ED ROS FT
CONST: no fevers, no chills, no trauma  EYES: no pain, no blurry vision   ENT: no sore throat, no epistaxis, no rhinorrhea  CV: no chest pain, no palpitations, no orthopnea, no extremity pain or swelling  RESP: no shortness of breath, no cough, no sputum, no pleurisy, no wheezing  ABD: no abdominal pain, no nausea, no vomiting, no diarrhea, no black or bloody stool  : no dysuria, no hematuria, no frequency, no urgency  MSK: no back pain, no neck pain, no extremity pain  NEURO: no headache, no sensory disturbances, no focal weakness, no dizziness  HEME: no easy bleeding or bruising  SKIN: no diaphoresis, no rash
hard copy

## 2022-06-12 NOTE — ED PROVIDER NOTE - NSFOLLOWUPINSTRUCTIONS_ED_ALL_ED_FT
Hypertension, Adult      Hypertension is another name for high blood pressure. High blood pressure forces your heart to work harder to pump blood. This can cause problems over time.    There are two numbers in a blood pressure reading. There is a top number (systolic) over a bottom number (diastolic). It is best to have a blood pressure that is below 120/80. Healthy choices can help lower your blood pressure, or you may need medicine to help lower it.      What are the causes?    The cause of this condition is not known. Some conditions may be related to high blood pressure.      What increases the risk?    •Smoking.      •Having type 2 diabetes mellitus, high cholesterol, or both.      •Not getting enough exercise or physical activity.      •Being overweight.      •Having too much fat, sugar, calories, or salt (sodium) in your diet.      •Drinking too much alcohol.      •Having long-term (chronic) kidney disease.      •Having a family history of high blood pressure.      •Age. Risk increases with age.      •Race. You may be at higher risk if you are .      •Gender. Men are at higher risk than women before age 45. After age 65, women are at higher risk than men.      •Having obstructive sleep apnea.      •Stress.        What are the signs or symptoms?  •High blood pressure may not cause symptoms. Very high blood pressure (hypertensive crisis) may cause:  •Headache.      •Feelings of worry or nervousness (anxiety).      •Shortness of breath.      •Nosebleed.      •A feeling of being sick to your stomach (nausea).      •Throwing up (vomiting).      •Changes in how you see.      •Very bad chest pain.      •Seizures.          How is this treated?  •This condition is treated by making healthy lifestyle changes, such as:  •Eating healthy foods.      •Exercising more.      •Drinking less alcohol.      •Your health care provider may prescribe medicine if lifestyle changes are not enough to get your blood pressure under control, and if:  •Your top number is above 130.      •Your bottom number is above 80.        •Your personal target blood pressure may vary.        Follow these instructions at home:      Eating and drinking    •If told, follow the DASH eating plan. To follow this plan:  •Fill one half of your plate at each meal with fruits and vegetables.      •Fill one fourth of your plate at each meal with whole grains. Whole grains include whole-wheat pasta, brown rice, and whole-grain bread.      •Eat or drink low-fat dairy products, such as skim milk or low-fat yogurt.      •Fill one fourth of your plate at each meal with low-fat (lean) proteins. Low-fat proteins include fish, chicken without skin, eggs, beans, and tofu.      •Avoid fatty meat, cured and processed meat, or chicken with skin.      •Avoid pre-made or processed food.        •Eat less than 1,500 mg of salt each day.    • Do not drink alcohol if:  •Your doctor tells you not to drink.      •You are pregnant, may be pregnant, or are planning to become pregnant.      •If you drink alcohol:•Limit how much you use to:  •0–1 drink a day for women.      •0–2 drinks a day for men.        •Be aware of how much alcohol is in your drink. In the [place] one drink equals one 12 oz bottle of beer (355 mL), one 5 oz glass of wine (148 mL), or one 1½ oz glass of hard liquor (44 mL).          Lifestyle      •Work with your doctor to stay at a healthy weight or to lose weight. Ask your doctor what the best weight is for you.      •Get at least 30 minutes of exercise most days of the week. This may include walking, swimming, or biking.      •Get at least 30 minutes of exercise that strengthens your muscles (resistance exercise) at least 3 days a week. This may include lifting weights or doing Pilates.      • Do not use any products that contain nicotine or tobacco, such as cigarettes, e-cigarettes, and chewing tobacco. If you need help quitting, ask your doctor.      •Check your blood pressure at home as told by your doctor.      •Keep all follow-up visits as told by your doctor. This is important.      Medicines     •Take over-the-counter and prescription medicines only as told by your doctor. Follow directions carefully.      • Do not skip doses of blood pressure medicine. The medicine does not work as well if you skip doses. Skipping doses also puts you at risk for problems.      •Ask your doctor about side effects or reactions to medicines that you should watch for.        Contact a doctor if you:    •Think you are having a reaction to the medicine you are taking.      •Have headaches that keep coming back (recurring).      •Feel dizzy.      •Have swelling in your ankles.      •Have trouble with your vision.        Get help right away if you:    •Get a very bad headache.      •Start to feel mixed up (confused).      •Feel weak or numb.      •Feel faint.    •Have very bad pain in your:  •Chest.      •Belly (abdomen).        •Throw up more than once.      •Have trouble breathing.        Summary    •Hypertension is another name for high blood pressure.      •High blood pressure forces your heart to work harder to pump blood.      •For most people, a normal blood pressure is less than 120/80.      •Making healthy choices can help lower blood pressure. If your blood pressure does not get lower with healthy choices, you may need to take medicine.      This information is not intended to replace advice given to you by your health care provider. Make sure you discuss any questions you have with your health care provider.

## 2022-06-12 NOTE — ED PROVIDER NOTE - CLINICAL SUMMARY MEDICAL DECISION MAKING FREE TEXT BOX
87M presenting for abnormal gait. On exam, hypertensive but nonfocal neuro exam. Will obtain labs to check lytes and CT to see any evidence of intracranial pathology. Will Reassess to dispo. KYLE Powers PGY3

## 2022-06-12 NOTE — ED PROVIDER NOTE - PHYSICAL EXAMINATION
Const: Well-nourished, Well-developed, appearing stated age.  Eyes: no conjunctival injection, and symmetrical lids.  HEENT: Head NCAT, no lesions. Atraumatic external nose and ears.   Neck: Symmetric, trachea midline.   CVS: +S1/S2, Radial and DP pulses 2+ b/l.   RESP: Unlabored respiratory effort. Clear to auscultation bilaterally.  GI: Nontender/Nondistended, No CVA tenderness b/l.   MSK: Normocephalic/Atraumatic, Lower Extremities w/o edema b/l.   Skin: Warm, dry and intact.   Neuro: CN 2-12 intact. No dysmetria/dysarthria. Motor & Sensation grossly intact in b/l UE and LE. Gait wnl.   Psych: Awake, Alert, & Oriented (AAO) x3. Appropriate mood and affect.

## 2022-06-12 NOTE — ED PROVIDER NOTE - NSFOLLOWUPCLINICS_GEN_ALL_ED_FT
[facility]  Neurology  [address] [place] [address] [address] [place] [address]  Phone: [phone]  Fax:

## 2022-06-12 NOTE — ED PROVIDER NOTE - PROGRESS NOTE DETAILS
Feels better, will DC w follow up w neuro for MRI. KYLE Powers PGY3 Feels better, BP is systolic 170s. will DC w follow up w neuro for MRI. KYLE Powers PGY3

## 2022-07-01 NOTE — PATIENT PROFILE ADULT. - SOCIAL CONCERNS
Pt. received sitting up in bed; AxOX3, on O2 @ 4 li NC, w/ IV access on (L) UE. Pt. is morbidly obese. None

## 2022-08-01 NOTE — PHYSICAL THERAPY INITIAL EVALUATION ADULT - HEALTH SCREEN CRITERIA
[de-identified] : 18 year old male  (SLAVAD, Merly YBARRA, mahesh )   right knee  pain since 7/28/22 \par The pain is located  anterior, medial \par The pain is associated with weakness, swelling after initial injury\par Worse with activity and better at rest.\par Has tried ice, ibuprofen \par Notes improvement since injury yes

## 2022-09-14 ENCOUNTER — APPOINTMENT (OUTPATIENT)
Dept: INTERNAL MEDICINE | Facility: CLINIC | Age: 87
End: 2022-09-14

## 2022-09-14 ENCOUNTER — NON-APPOINTMENT (OUTPATIENT)
Age: 87
End: 2022-09-14

## 2022-09-14 ENCOUNTER — OUTPATIENT (OUTPATIENT)
Dept: OUTPATIENT SERVICES | Facility: HOSPITAL | Age: 87
LOS: 1 days | End: 2022-09-14

## 2022-09-14 VITALS
WEIGHT: 147.4 LBS | HEART RATE: 72 BPM | BODY MASS INDEX: 23.13 KG/M2 | HEIGHT: 67 IN | SYSTOLIC BLOOD PRESSURE: 140 MMHG | DIASTOLIC BLOOD PRESSURE: 80 MMHG | OXYGEN SATURATION: 97 % | TEMPERATURE: 97.5 F

## 2022-09-14 PROCEDURE — 99204 OFFICE O/P NEW MOD 45 MIN: CPT | Mod: GC

## 2022-09-14 RX ORDER — DONEPEZIL HYDROCHLORIDE 10 MG/1
10 TABLET ORAL
Qty: 90 | Refills: 0 | Status: COMPLETED | COMMUNITY
Start: 2018-10-22 | End: 2022-09-14

## 2022-09-14 RX ORDER — ASPIRIN ENTERIC COATED TABLETS 81 MG 81 MG/1
81 TABLET, DELAYED RELEASE ORAL
Qty: 90 | Refills: 0 | Status: COMPLETED | COMMUNITY
Start: 2018-10-01 | End: 2022-09-14

## 2022-09-14 RX ORDER — CLOPIDOGREL BISULFATE 75 MG/1
75 TABLET, FILM COATED ORAL
Qty: 30 | Refills: 0 | Status: COMPLETED | COMMUNITY
Start: 2018-09-19 | End: 2022-09-14

## 2022-09-14 RX ORDER — HYDROCHLOROTHIAZIDE 25 MG/1
25 TABLET ORAL
Qty: 90 | Refills: 0 | Status: COMPLETED | COMMUNITY
Start: 2018-10-01 | End: 2022-09-14

## 2022-09-14 RX ORDER — ATORVASTATIN CALCIUM 80 MG/1
80 TABLET, FILM COATED ORAL
Qty: 90 | Refills: 0 | Status: COMPLETED | COMMUNITY
Start: 2018-10-01 | End: 2022-09-14

## 2022-09-14 RX ORDER — AMLODIPINE BESYLATE 10 MG/1
10 TABLET ORAL
Qty: 90 | Refills: 0 | Status: COMPLETED | COMMUNITY
Start: 2018-05-24 | End: 2022-09-14

## 2022-09-14 NOTE — PHYSICAL EXAM
[Normal Sclera/Conjunctiva] : normal sclera/conjunctiva [Normal Outer Ear/Nose] : the outer ears and nose were normal in appearance [Normal TMs] : both tympanic membranes were normal [Normal] : no respiratory distress, lungs were clear to auscultation bilaterally and no accessory muscle use [Soft] : abdomen soft [Non Tender] : non-tender [Normal Affect] : the affect was normal [Normal Insight/Judgement] : insight and judgment were intact [de-identified] : +Irregularly irregular rhythm, no murmurs [de-identified] : No tenderness of lower back or lumbar spine on palpation [de-identified] : MMSE 12/20 [de-identified] : IVÁN ramirez

## 2022-09-14 NOTE — REVIEW OF SYSTEMS
[Vision Problems] : vision problems [Hearing Loss] : hearing loss [Back Pain] : back pain [Memory Loss] : memory loss [Negative] : Heme/Lymph

## 2022-09-14 NOTE — ASSESSMENT
[FreeTextEntry1] : 88yo M PMHx CVA b/l inferior cerebellar CVA, dementia, HTN, BPH who presents to establish care.\par \par HCM\par - COVID vaccine with 1 booster, reminded family to obtain 2nd booster as soon as possible\par - Flu shot given today (high dose)\par - Family to bring in records of previous vaccinations\par \par RTC 3 months. CDW Dr. Andrews.\par \par Bill

## 2022-09-14 NOTE — HISTORY OF PRESENT ILLNESS
[FreeTextEntry1] : establish care [de-identified] : 86yo M PMHx CVA b/l inferior cerebellar CVA, dementia, HTN, BPH who presents to [facility] care. The patient is accompanied by his son who helps provide further history.\par \par Patient used to have PCP Dr. Mckeon on [facility].\par \par The patient is able to dress, eat, bathe, and walk short distances by himself. However the patient has significant issues doing higher level tasks, including cooking, leaving the house, managing finances. He requires supervision at all times and has had episodes during which he has left the stove on, cannot find the bathroom in his own house. He lives with his daughter and son, except between the hours of 8:30 am and ~12:30 pm, during which he has A services. The family however is finding it increasingly difficult to take care of him at home and are wondering if they can increase his hours. MMSE was performed today and was somewhat limited by education (pt with difficulty reading and writing, partly due to poor vision and hearing), which was scored as 12/30, indicating severe cognitive impairment.\par \par He has had no falls within the past year and does not walk with assistance devices, however walks slowly.\par \par He has had worsening hearing loss per the son. He also has difficulty with his vision despite wearing eyeglasses.\par \par He eats soft foods by himself and does not cough or have choking episodes while eating. They report his dentures do not fit well and he usually does not wear them.

## 2022-09-14 NOTE — HEALTH RISK ASSESSMENT
[Never] : Never [0-4] : 0-4 [No] : No [Never (0 pts)] : Never (0 points) [No falls in past year] : Patient reported no falls in the past year [0] : 2) Feeling down, depressed, or hopeless: Not at all (0) [PHQ-2 Negative - No further assessment needed] : PHQ-2 Negative - No further assessment needed [Change in mental status noted] : Change in mental status noted [Learning/Retaining New Information] : difficulty learning/retaining new information [Handling Complex Tasks] : difficulty handling complex tasks [Reasoning] : difficulty with reasoning [Spatial Ability and Orientation] : difficulty with spatial ability and orientation [With Family] : lives with family [Retired] : retired [Feels Safe at Home] : Feels safe at home [Independent] : feeding [Some assistance needed] : doing laundry [Full assistance needed] : managing finances [Reports changes in hearing] : Reports changes in hearing [Reports changes in vision] : Reports changes in vision [Reports changes in dental health] : Reports changes in dental health [de-identified] : Worsening hearing loss [de-identified] : Worsening vision despite eyeglasses [de-identified] : Poor fitting dentures

## 2022-09-15 LAB
BASOPHILS # BLD AUTO: 0.04 K/UL
BASOPHILS NFR BLD AUTO: 0.7 %
EOSINOPHIL # BLD AUTO: 0.17 K/UL
EOSINOPHIL NFR BLD AUTO: 3 %
HCT VFR BLD CALC: 39.7 %
HGB BLD-MCNC: 12.6 G/DL
IMM GRANULOCYTES NFR BLD AUTO: 0.2 %
IRON SATN MFR SERPL: 30 %
IRON SERPL-MCNC: 82 UG/DL
LYMPHOCYTES # BLD AUTO: 3.11 K/UL
LYMPHOCYTES NFR BLD AUTO: 55.3 %
MAN DIFF?: NORMAL
MCHC RBC-ENTMCNC: 28.1 PG
MCHC RBC-ENTMCNC: 31.7 GM/DL
MCV RBC AUTO: 88.6 FL
MONOCYTES # BLD AUTO: 0.54 K/UL
MONOCYTES NFR BLD AUTO: 9.6 %
NEUTROPHILS # BLD AUTO: 1.75 K/UL
NEUTROPHILS NFR BLD AUTO: 31.2 %
PLATELET # BLD AUTO: 122 K/UL
RBC # BLD: 4.48 M/UL
RBC # FLD: 14.9 %
TIBC SERPL-MCNC: 278 UG/DL
UIBC SERPL-MCNC: 196 UG/DL
WBC # FLD AUTO: 5.62 K/UL

## 2022-09-22 DIAGNOSIS — H91.90 UNSPECIFIED HEARING LOSS, UNSPECIFIED EAR: ICD-10-CM

## 2022-09-22 DIAGNOSIS — N40.0 BENIGN PROSTATIC HYPERPLASIA WITHOUT LOWER URINARY TRACT SYMPTOMS: ICD-10-CM

## 2022-09-22 DIAGNOSIS — K08.109 COMPLETE LOSS OF TEETH, UNSPECIFIED CAUSE, UNSPECIFIED CLASS: ICD-10-CM

## 2022-09-22 DIAGNOSIS — I49.9 CARDIAC ARRHYTHMIA, UNSPECIFIED: ICD-10-CM

## 2022-09-22 DIAGNOSIS — I10 ESSENTIAL (PRIMARY) HYPERTENSION: ICD-10-CM

## 2022-09-22 DIAGNOSIS — K59.00 CONSTIPATION, UNSPECIFIED: ICD-10-CM

## 2022-09-22 DIAGNOSIS — D64.9 ANEMIA, UNSPECIFIED: ICD-10-CM

## 2022-09-22 DIAGNOSIS — I63.9 CEREBRAL INFARCTION, UNSPECIFIED: ICD-10-CM

## 2022-09-22 DIAGNOSIS — Z23 ENCOUNTER FOR IMMUNIZATION: ICD-10-CM

## 2022-09-22 DIAGNOSIS — H54.3 UNQUALIFIED VISUAL LOSS, BOTH EYES: ICD-10-CM

## 2022-09-22 DIAGNOSIS — F03.90 UNSPECIFIED DEMENTIA WITHOUT BEHAVIORAL DISTURBANCE: ICD-10-CM

## 2022-09-23 ENCOUNTER — NON-APPOINTMENT (OUTPATIENT)
Age: 87
End: 2022-09-23

## 2022-10-12 ENCOUNTER — OUTPATIENT (OUTPATIENT)
Dept: OUTPATIENT SERVICES | Facility: HOSPITAL | Age: 87
LOS: 1 days | End: 2022-10-12

## 2022-10-12 ENCOUNTER — APPOINTMENT (OUTPATIENT)
Dept: CV DIAGNOSITCS | Facility: HOSPITAL | Age: 87
End: 2022-10-12

## 2022-10-12 DIAGNOSIS — I49.9 CARDIAC ARRHYTHMIA, UNSPECIFIED: ICD-10-CM

## 2022-10-12 PROCEDURE — 93306 TTE W/DOPPLER COMPLETE: CPT | Mod: 26

## 2022-11-03 ENCOUNTER — APPOINTMENT (OUTPATIENT)
Dept: OTOLARYNGOLOGY | Facility: CLINIC | Age: 87
End: 2022-11-03

## 2022-11-03 VITALS
BODY MASS INDEX: 23.07 KG/M2 | HEART RATE: 64 BPM | DIASTOLIC BLOOD PRESSURE: 70 MMHG | WEIGHT: 147 LBS | SYSTOLIC BLOOD PRESSURE: 114 MMHG | HEIGHT: 67 IN

## 2022-11-03 PROCEDURE — G0268 REMOVAL OF IMPACTED WAX MD: CPT

## 2022-11-03 PROCEDURE — 92557 COMPREHENSIVE HEARING TEST: CPT

## 2022-11-03 PROCEDURE — 92567 TYMPANOMETRY: CPT

## 2022-11-03 PROCEDURE — 99203 OFFICE O/P NEW LOW 30 MIN: CPT | Mod: 25

## 2022-11-03 NOTE — HISTORY OF PRESENT ILLNESS
[de-identified] : 87 year old male with dementia presents for evaluation for hearing loss. \par Patient has gradual hearing loss for 5 months. \par Last audiogram over 2 years ago. \par Occuptation: [employer]\par Denies hearing aids. \par Denies otalgia, otorrhea, ear infections, tinnitus, dizziness, vertigo, ear surgeries. \par Denies history of head/otologic trauma, chemo therapy, IV antibiotics or ototoxins. \par Denies loud noise exposure.

## 2022-11-03 NOTE — DATA REVIEWED
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 LISSETH Tymp A.

## 2022-11-03 NOTE — PHYSICAL EXAM
[FreeTextEntry8] : cerumen impaction. removed [Normal] : mucosa is normal [Midline] : trachea located in midline position

## 2022-11-03 NOTE — ASSESSMENT
[FreeTextEntry1] : 87 year M  with right cerumen impaction and bilateral SNHL. Patient tolerated cerumen removed without complaints.\par \par Personally Reviewed Audiogram shows AU Mild to moderately severe SNHL 250-8k hz. AU Tymp A. \par \par Recommend:\par Cerumen Impaction\par -Discussed not using q-tips or instruments to remove wax\par -Olive or mineral oil 1x times every 2 week to keep ear canal lubricated. Discussed that the ear is a self cleaning structure and just allow it clean itself. If wax builds up can try debrox. Once it gets impacted recommend return to get it cleaned out.\par \par SNHL\par -Discussed Benefit of Hearings Aids and their value of helping keep brain stimulated by helping hear conversation which keeps a person active and socially connected. Stressed also the association with a lower risk of incident dementia and slower cognitive decline.\par -Clearance Hearing Aid Evaluation Given\par \par -Return to clinic annually to monitor hearing loss or sooner if new/worsen symptoms present\par

## 2022-11-03 NOTE — REASON FOR VISIT
[Initial Evaluation] : an initial evaluation for [Formal Caregiver] : formal caregiver [FreeTextEntry2] : hearing loss.

## 2022-11-16 NOTE — PATIENT PROFILE ADULT. - HOW PATIENT ADDRESSED, PROFILE
[place] [facility] Counseling: I discussed with the patient the risks of valacyclovir including but not limited to kidney damage, nausea, vomiting and severe allergy.  The patient understands that if the infection seems to be worsening or is not improving, they are to call.

## 2022-11-28 ENCOUNTER — APPOINTMENT (OUTPATIENT)
Dept: CARDIOLOGY | Facility: CLINIC | Age: 87
End: 2022-11-28

## 2022-12-13 ENCOUNTER — APPOINTMENT (OUTPATIENT)
Dept: OPHTHALMOLOGY | Facility: CLINIC | Age: 87
End: 2022-12-13

## 2022-12-13 ENCOUNTER — NON-APPOINTMENT (OUTPATIENT)
Age: 87
End: 2022-12-13

## 2022-12-13 PROCEDURE — 92004 COMPRE OPH EXAM NEW PT 1/>: CPT

## 2022-12-13 PROCEDURE — 92025 CPTRIZED CORNEAL TOPOGRAPHY: CPT

## 2022-12-13 PROCEDURE — 92285 EXTERNAL OCULAR PHOTOGRAPHY: CPT

## 2022-12-14 ENCOUNTER — NON-APPOINTMENT (OUTPATIENT)
Age: 87
End: 2022-12-14

## 2022-12-14 ENCOUNTER — APPOINTMENT (OUTPATIENT)
Dept: CARDIOLOGY | Facility: HOSPITAL | Age: 87
End: 2022-12-14

## 2022-12-14 VITALS — WEIGHT: 152 LBS | BODY MASS INDEX: 23.81 KG/M2

## 2022-12-14 VITALS — DIASTOLIC BLOOD PRESSURE: 81 MMHG | SYSTOLIC BLOOD PRESSURE: 171 MMHG

## 2022-12-14 VITALS
BODY MASS INDEX: 23.81 KG/M2 | HEIGHT: 67 IN | SYSTOLIC BLOOD PRESSURE: 162 MMHG | OXYGEN SATURATION: 99 % | DIASTOLIC BLOOD PRESSURE: 93 MMHG | TEMPERATURE: 98.1 F | HEART RATE: 72 BPM

## 2022-12-15 NOTE — CARDIOLOGY SUMMARY
[de-identified] : 12/14/22 - sinus with PACs [de-identified] : DIMENSIONS:\par Dimensions:     Normal Values:\par LA:     3.4 cm    2.0 - 4.0 cm\par Ao:     3.2 cm    2.0 - 3.8 cm\par SEPTUM: 1.0 cm    0.6 - 1.2 cm\par PWT:    1.2 cm    0.6 - 1.1 cm\par LVIDd:  4.8 cm    3.0 - 5.6 cm\par LVIDs:  3.4 cm    1.8 - 4.0 cm\par Derived Variables:\par LVMI: 115 g/m2\par RWT: 0.50\par Fractional short: 29 %\par Ejection Fraction (Modified Funez Rule): 68 %\par ------------------------------------------------------------------------\par OBSERVATIONS:\par Mitral Valve: Normal mitral valve. Mild mitral\par regurgitation.\par Aortic Root: Normal aortic root.\par Aortic Valve: Normal trileaflet aortic valve.\par Left Atrium: Normal left atrium.  LA volume index = 33\par cc/m2.\par Left Ventricle: Normal left ventricular systolic function.\par No segmental wall motion abnormalities. Mild concentric\par left ventricular hypertrophy. (DT:301 ms).\par Right Heart: Normal right atrium. Normal right ventricular\par size and function. Normal tricuspid valve. Normal pulmonic\par valve.\par Pericardium/PleuraNormal pericardium with no pericardial\par effusion.\par ------------------------------------------------------------------------\par CONCLUSIONS:\par 1. Mild concentric left ventricular hypertrophy.\par 2. Normal left ventricular systolic function. No segmental\par wall motion abnormalities.\par 3. Normal right ventricular size and function.\par ------------------------------------------------------------------------jina Confirmed on  10/12/2022 - 15:29:46 by Mohan Reed M.D.\par ------------------------------------------------------------------------

## 2022-12-15 NOTE — DISCUSSION/SUMMARY
[With Me] : with me [___ Week(s)] : in [unfilled] week(s) [FreeTextEntry1] : 87M with CVA, dementia, HTN with LVH, BPH who was referred by PCP for an irregularly irregular heart rhythm.\par \par Irregular heart rhythm / CVA: no evidence of atrial fibrillation on ECGs, however do see PACs on multiple ECGs. Additionally, CVA thought to be from atherosclerotic plaque based on neurology's evaluation\par - continue ASA, plavix, Lipitor\par - obtain holter monitor to assess burden of ectopy\par - based on holter results, may potentially start on a BB to decrease burden of PACs\par \par HTN/LVH: Uncontrolled on norvasc 5, however hesitant to be aggressive with HTN management given hypotension recently\par - increased norvasc to 10 mg QD\par - instructed patient and daughter to check BP daily, and call if BPs remain elevated\par - will need FU in a week to ensure patient's BP is controlled\par \par Discussed with Dr. Dawson Hearn

## 2022-12-15 NOTE — REASON FOR VISIT
[FreeTextEntry1] : 87M with CVA, dementia, HTN with LVH, BPH who was referred by PCP for an irregularly irregular heart rhythm.\par \par The patient was seen in medicine clinic 9/14/22, where it was noted that the patient had an irregular heart rhythm on exam. 2 EKGs were done. Initial one showed sinus arrhythmia with PACs, and subsequent one showed sinus arrhythmia. Patient was referred for workup of possible atrial fibrillation given the patient's history of CVA. \par \par Sept 2018 CVA:\par CTA head and neck showed complete versus partially occlusive thrombus in distal (bilateral) vertebral arteries (V4 segments and left vert after origin of PICA), proximal and mid basilar artery with distal reconstitution probably through leptomeningeal collaterals without significant extracranial cerebral large vessel severe stenosis or occlusion. MRI brain on 9/4 showed bilateral PICA distribution and left PCA distribution punctate infarcts and severe leukoaraiosis. 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 symptomatic intracranial atherosclerosis.\par \par Of note, patient was also hypertensive today, 162-171/77-93. Only taking norvasc 5 mg. He was accompanied by his daughter, who stated that a few weeks ago his SBP was < 120, but was uncertain about why that was. Has not been taking his BP recently at home, but does have a BP cuff. \par \par Pertinent meds:\par ASA 81QD\par plavix 75 QD\par Lipitor 80 QHS\par norvasc 5

## 2022-12-28 ENCOUNTER — APPOINTMENT (OUTPATIENT)
Dept: INTERNAL MEDICINE | Facility: CLINIC | Age: 87
End: 2022-12-28

## 2023-01-11 ENCOUNTER — APPOINTMENT (OUTPATIENT)
Dept: CARDIOLOGY | Facility: HOSPITAL | Age: 88
End: 2023-01-11

## 2023-01-11 VITALS
OXYGEN SATURATION: 94 % | BODY MASS INDEX: 24.64 KG/M2 | DIASTOLIC BLOOD PRESSURE: 90 MMHG | WEIGHT: 157 LBS | HEART RATE: 66 BPM | SYSTOLIC BLOOD PRESSURE: 142 MMHG | HEIGHT: 67 IN

## 2023-01-11 NOTE — PHYSICAL EXAM
[Well Developed] : well developed [Well Nourished] : well nourished [No Acute Distress] : no acute distress [Normal Conjunctiva] : normal conjunctiva [Normal Venous Pressure] : normal venous pressure [No Carotid Bruit] : no carotid bruit [Normal] : alert and oriented, normal memory [de-identified] : Defers questions to his daughter [de-identified] : Irregularly irregular, no m/r/g

## 2023-01-11 NOTE — HISTORY OF PRESENT ILLNESS
[FreeTextEntry1] : 87M with CVA, dementia, HTN with LVH, BPH who was referred by PCP for an irregularly irregular heart rhythm.\par The patient was seen in medicine clinic 9/14/22, where it was noted that the patient had an irregular heart rhythm on exam. 2 EKGs were done. Initial one showed sinus arrhythmia with PACs, and subsequent one showed sinus arrhythmia. Patient was referred for workup of possible atrial fibrillation given the patient's history of CVA. \par \par Sept 2018 CVA:\par CTA head and neck showed complete versus partially occlusive thrombus in distal (bilateral) vertebral arteries (V4 segments and left vert after origin of PICA), proximal and mid basilar artery with distal reconstitution probably through leptomeningeal collaterals without significant extracranial cerebral large vessel severe stenosis or occlusion. MRI brain on 9/4 showed bilateral PICA distribution and left PCA distribution punctate infarcts and severe leukoaraiosis. 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 symptomatic intracranial atherosclerosis.\par \par Patient last saw Dr. Yari Noriega (cardiology) on 12/14/22. At the time, he was hypertensive to 162-171/77-93, at the time only taking norvasc 5mg. Ultimately increased to 10mg daily and daughter has brought in a BP log and his blood pressure is generally well controlled with most readings 90s-130s systolic/50s-80s\par Given frequent APCs seen on EKG, Holter was obtained which yielded the following results: Baseline sinus with HR , rare isolated PVCs, frequent APCs nonsustained runs of Atach. Supraventricular beats amounted to burden of 19%, 16 tachycardia events > 120bpm totaling 71 beats. Isolated PVcs 596 totaling \par \par Today, patient is asymptomatic from palpitations and has no complaints. \par \par Pertinent meds:\par ASA 81QD\par plavix 75 QD\par Lipitor 80 QHS\par norvasc 10

## 2023-01-11 NOTE — CARDIOLOGY SUMMARY
[de-identified] : EC22 - sinus with PACs \par  [de-identified] : Baseline sinus with HR , rare isolated PVCs, frequent APCs nonsustained runs of Atach\par Supraventricular beats 19%, 16 tachycardia events > 120bpm totaling 71 beats. \par Isolated PVcs 596 [de-identified] : Echo: DIMENSIONS:\par Dimensions: Normal Values:\par LA: 3.4 cm 2.0 - 4.0 cm\par Ao: 3.2 cm 2.0 - 3.8 cm\par SEPTUM: 1.0 cm 0.6 - 1.2 cm\par PWT: 1.2 cm 0.6 - 1.1 cm\par LVIDd: 4.8 cm 3.0 - 5.6 cm\par LVIDs: 3.4 cm 1.8 - 4.0 cm\par Derived Variables:\par LVMI: 115 g/m2\par RWT: 0.50\par Fractional short: 29 %\par Ejection Fraction (Modified Funez Rule): 68 %\par ------------------------------------------------------------------------\par OBSERVATIONS:\par Mitral Valve: Normal mitral valve. Mild mitral\par regurgitation.\par Aortic Root: Normal aortic root.\par Aortic Valve: Normal trileaflet aortic valve.\par Left Atrium: Normal left atrium. LA volume index = 33\par cc/m2.\par Left Ventricle: Normal left ventricular systolic function.\par No segmental wall motion abnormalities. Mild concentric\par left ventricular hypertrophy. (DT:301 ms).\par Right Heart: Normal right atrium. Normal right ventricular\par size and function. Normal tricuspid valve. Normal pulmonic\par valve.\par Pericardium/PleuraNormal pericardium with no pericardial\par effusion.\par ------------------------------------------------------------------------\par CONCLUSIONS:\par 1. Mild concentric left ventricular hypertrophy.\par 2. Normal left ventricular systolic function. No segmental\par wall motion abnormalities.\par 3. Normal right ventricular size and function.\par ------------------------------------------------------------------------jina Confirmed on 10/12/2022 - 15:29:46 by Mohan Reed M.D.\par ------------------------------------------------------------------------ jina

## 2023-01-11 NOTE — ASSESSMENT
[FreeTextEntry1] : 87M with CVA, dementia, HTN with LVH, BPH who was referred by PCP for an irregularly irregular heart rhythm.\par \par Irregular heart rhythm / CVA: no evidence of atrial fibrillation on ECGs, however do see PACs on multiple ECGs. Additionally, CVA thought to be from atherosclerotic plaque based on neurology's evaluation\par - continue ASA, plavix, Lipitor\par - Holter monitor with frequent ectopy, with low PVC burden (total of 596) and more frequent APCs totaling 19% of beats. While asymptomatic, there runs a risk of arrhythmia induced cardiomyopathy, atrial tachycardia runs, SVT, etc and so will start on once daily low dose metoprolol 25mg XL. \par \par HTN/LVH: \par - increased norvasc to 10 mg QD last clinic visit and now mostly controlled\par - instructed patient and daughter to check BP daily, and call if BPs remain elevated\par \par Discussed with Dr. Earle Hearn\par \par RTC x3 months to follow up with Dr. Noriega \par

## 2023-01-12 ENCOUNTER — APPOINTMENT (OUTPATIENT)
Dept: NEUROLOGY | Facility: HOSPITAL | Age: 88
End: 2023-01-12

## 2023-01-12 ENCOUNTER — OUTPATIENT (OUTPATIENT)
Dept: OUTPATIENT SERVICES | Facility: HOSPITAL | Age: 88
LOS: 1 days | End: 2023-01-12
Payer: MEDICAID

## 2023-01-12 VITALS
WEIGHT: 152 LBS | HEART RATE: 75 BPM | RESPIRATION RATE: 14 BRPM | DIASTOLIC BLOOD PRESSURE: 76 MMHG | SYSTOLIC BLOOD PRESSURE: 131 MMHG | TEMPERATURE: 97.8 F | HEIGHT: 67 IN | BODY MASS INDEX: 23.86 KG/M2

## 2023-01-12 DIAGNOSIS — Z86.73 PERSONAL HISTORY OF TRANSIENT ISCHEMIC ATTACK (TIA), AND CEREBRAL INFARCTION W/OUT RESIDUAL DEFICITS: ICD-10-CM

## 2023-01-12 DIAGNOSIS — Z86.39 PERSONAL HISTORY OF OTHER ENDOCRINE, NUTRITIONAL AND METABOLIC DISEASE: ICD-10-CM

## 2023-01-12 DIAGNOSIS — R51.9 HEADACHE, UNSPECIFIED: ICD-10-CM

## 2023-01-12 DIAGNOSIS — Z86.73 PERSONAL HISTORY OF TRANSIENT ISCHEMIC ATTACK (TIA), AND CEREBRAL INFARCTION WITHOUT RESIDUAL DEFICITS: ICD-10-CM

## 2023-01-12 DIAGNOSIS — Z86.79 PERSONAL HISTORY OF OTHER DISEASES OF THE CIRCULATORY SYSTEM: ICD-10-CM

## 2023-01-12 PROCEDURE — G0463: CPT

## 2023-01-12 RX ORDER — CLOPIDOGREL BISULFATE 75 MG/1
75 TABLET, FILM COATED ORAL DAILY
Qty: 90 | Refills: 1 | Status: DISCONTINUED | COMMUNITY
Start: 2022-09-14 | End: 2023-01-12

## 2023-01-12 NOTE — DISCUSSION/SUMMARY
[FreeTextEntry1] : 87 year old man with history of cerebellar stroke with no residual deficits presented to the clinic with 10 months waxing waning memory loss. \par \par Impression \par Mild cognitive impairment vs mild dementia. Less likely NPH given no gait difficulties nor urinary symptoms \par \par Plan:\par Continue aspirin 81mg PO, stop Plavix  \par Continue Atorvastatin 80mg Qhs \par Bloodwork for A1C, Lipid profile, Mag, Phos, CBC, CMP, Vitamin B1, B12, B6, D, Syphillis, TSH \par CT Head non con \par Start Donepezil 5mg PO Qhs \par RTC 1 month

## 2023-01-12 NOTE — HISTORY OF PRESENT ILLNESS
[FreeTextEntry1] : 87 year old Left handed man with previous history of cerebellar stroke who arrives today at the recommendation of his PCP Dr. Walter for memory loss. According to daughter, ever since April, 2022, the patient has had waxing waning episodes of forgetfulness. Example include forgetting how to play dominos with his children, heating up food in the microwave much longer than normal, and forgetting how to get to the bathroom. He complains of some lower calf discomfort when he would walk for more than one block. Denies nausea, vomiting, constipation. He had COVID in August, 2022. No recent imaging. The family said that after they moved houses in May, 2022 that it seemed like the patient's symptoms may have gotten worse. \par  \par Of note, the patient was seen in [facility] for acute dysarthria in 9/3/2018. He was found to have a stroke upon imaging which demonstrated bilateral PICA and L PCA distribution infarct, mechanism likely large vessel disease. Patient undergone physical therapy and does not have any residual deficits. The patient was started on aspirin, plavix and atorvastatin but is now only on aspirin and atorvastatin. He received a Holter monitor which demonstrated some ectopy and was started on metoprolol.  Per daughter, patient has been taking his medication as prescribed.

## 2023-01-12 NOTE — REVIEW OF SYSTEMS
[Memory Lapses or Loss] : memory loss [Decr. Concentrating Ability] : decreased concentrating ability [Poor Coordination] : poor coordination [Eyesight Problems] : eyesight problems [As noted in HPI] : as noted in HPI [As Noted in HPI] : as noted in HPI [Negative] : Endocrine [Facial Weakness] : no facial weakness [Arm Weakness] : no arm weakness [Hand Weakness] : no hand weakness [Leg Weakness] : no leg weakness [Difficulties in Speech] : no speech difficulties [Numbness] : no numbness [Tingling] : no tingling [Abnormal Sensation] : no abnormal sensation [Hypersensitivity] : no hypersensitivity [Seizures] : no convulsions [Dizziness] : no dizziness [Fainting] : no fainting [Lightheadedness] : no lightheadedness [Difficulty Walking] : no difficulty walking [Loss Of Hearing] : no hearing loss [Abdominal Pain] : no abdominal pain [Dysuria] : no dysuria [Incontinence] : no incontinence

## 2023-01-12 NOTE — END OF VISIT
[] : Resident [FreeTextEntry3] : MCI vs Mild Dementia\par MMSE 13 ( educational limitations)\par Will exclude reversible causes of cognitive impairment \par Start Aricept\par Spoke with daughter, update given, supervision for safety recommended \par

## 2023-01-12 NOTE — PHYSICAL EXAM
[General Appearance - Alert] : alert [General Appearance - Well-Appearing] : healthy appearing [] : normal voice and communication [Impaired Insight] : insight and judgment were intact [Person] : oriented to person [Place] : oriented to place [Time] : oriented to time [Naming Objects] : no difficulty naming common objects [Repeating Phrases] : no difficulty repeating a phrase [Cranial Nerves Optic (II)] : visual acuity intact bilaterally,  visual fields full to confrontation, pupils equal round and reactive to light [Cranial Nerves Oculomotor (III)] : extraocular motion intact [Cranial Nerves Trigeminal (V)] : facial sensation intact symmetrically [Cranial Nerves Facial (VII)] : face symmetrical [Cranial Nerves Vestibulocochlear (VIII)] : hearing was intact bilaterally [Cranial Nerves Glossopharyngeal (IX)] : tongue and palate midline [Cranial Nerves Accessory (XI - Cranial And Spinal)] : head turning and shoulder shrug symmetric [Cranial Nerves Hypoglossal (XII)] : there was no tongue deviation with protrusion [Motor Tone] : muscle tone was normal in all four extremities [Motor Strength] : muscle strength was normal in all four extremities [No Muscle Atrophy] : normal bulk in all four extremities [Motor Handedness Left-Handed] : the patient is left hand dominant [5] : ankle plantar flexion 5/5 [Sensation Tactile Decrease] : light touch was intact [Sensation Pain / Temperature Decrease] : pain and temperature was intact [Sensation Vibration Decrease] : vibration was intact [Abnormal Walk] : normal gait [Balance] : balance was intact [1+] : Brachioradialis left 1+ [2+] : Ankle jerk left 2+ [Hearing Threshold Finger Rub Not [place]] : hearing was normal [Paresis Pronator Drift Right-Sided] : no pronator drift on the right [Paresis Pronator Drift Left-Sided] : no pronator drift on the left [Motor Strength Shoulders Right Weakness] : normal shoulder strength [Motor Strength Wrists Right Weakness] : normal wrist strength [Hand Weakness Right] : normal hand  [Motor Strength Fingers Right Hand Weakness] : normal finger strength [Motor Strength Shoulders Left Weakness] : normal shoulder strength [Motor Strength Wrists Left Weakness] : normal wrist strength [Motor Strength Fingers Left Hand Weakness] : normal finger strength [Motor Strength Hips Right Weakness] : normal hip strength [Motor Strength Knee Right Weakness] : normal knee strength [Motor Strength Ankle Right Weakness] : normal ankle strength [Motor Strength Hips Left Weakness] : normal hip strength [Motor Strength Knee Left Weakness] : normal knee strength [Motor Strength Ankle Left Weakness] : normal ankle strength [Romberg's Sign] : Romberg's sign was negtive [FreeTextEntry4] : MMSE: 13/30\par Elementary school education.  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

## 2023-01-24 NOTE — PHYSICAL THERAPY INITIAL EVALUATION ADULT - BALANCE TRAINING, PT EVAL
GOAL: Patient will demonstrate improved static/dynamic balance to good, in order to improve stability, decrease fall risk and increase independence with ADLs within 4 weeks. Surgeon/Pathologist Verbiage (Will Incorporate Name Of Surgeon From Intro If Not Blank): operated in two distinct and integrated capacities as the surgeon and pathologist.

## 2023-01-27 ENCOUNTER — OUTPATIENT (OUTPATIENT)
Dept: OUTPATIENT SERVICES | Facility: HOSPITAL | Age: 88
LOS: 1 days | End: 2023-01-27
Payer: MEDICAID

## 2023-01-27 ENCOUNTER — APPOINTMENT (OUTPATIENT)
Dept: OPHTHALMOLOGY | Facility: CLINIC | Age: 88
End: 2023-01-27
Payer: MEDICAID

## 2023-01-27 ENCOUNTER — APPOINTMENT (OUTPATIENT)
Dept: CT IMAGING | Facility: IMAGING CENTER | Age: 88
End: 2023-01-27
Payer: MEDICAID

## 2023-01-27 ENCOUNTER — NON-APPOINTMENT (OUTPATIENT)
Age: 88
End: 2023-01-27

## 2023-01-27 DIAGNOSIS — Z00.8 ENCOUNTER FOR OTHER GENERAL EXAMINATION: ICD-10-CM

## 2023-01-27 DIAGNOSIS — F03.90 UNSPECIFIED DEMENTIA, UNSPECIFIED SEVERITY, WITHOUT BEHAVIORAL DISTURBANCE, PSYCHOTIC DISTURBANCE, MOOD DISTURBANCE, AND ANXIETY: ICD-10-CM

## 2023-01-27 PROCEDURE — 70450 CT HEAD/BRAIN W/O DYE: CPT

## 2023-01-27 PROCEDURE — 70450 CT HEAD/BRAIN W/O DYE: CPT | Mod: 26

## 2023-01-27 PROCEDURE — 92133 CPTRZD OPH DX IMG PST SGM ON: CPT

## 2023-01-27 PROCEDURE — 92012 INTRM OPH EXAM EST PATIENT: CPT

## 2023-01-31 ENCOUNTER — APPOINTMENT (OUTPATIENT)
Dept: INTERNAL MEDICINE | Facility: CLINIC | Age: 88
End: 2023-01-31

## 2023-02-13 ENCOUNTER — RX RENEWAL (OUTPATIENT)
Age: 88
End: 2023-02-13

## 2023-02-23 ENCOUNTER — OUTPATIENT (OUTPATIENT)
Dept: OUTPATIENT SERVICES | Facility: HOSPITAL | Age: 88
LOS: 1 days | End: 2023-02-23
Payer: MEDICAID

## 2023-02-23 ENCOUNTER — APPOINTMENT (OUTPATIENT)
Dept: NEUROLOGY | Facility: HOSPITAL | Age: 88
End: 2023-02-23

## 2023-02-23 VITALS
TEMPERATURE: 97.8 F | WEIGHT: 148 LBS | HEART RATE: 66 BPM | SYSTOLIC BLOOD PRESSURE: 118 MMHG | HEIGHT: 67 IN | DIASTOLIC BLOOD PRESSURE: 69 MMHG | BODY MASS INDEX: 23.23 KG/M2 | RESPIRATION RATE: 14 BRPM

## 2023-02-23 DIAGNOSIS — F03.90 UNSPECIFIED DEMENTIA, UNSPECIFIED SEVERITY, WITHOUT BEHAVIORAL DISTURBANCE, PSYCHOTIC DISTURBANCE, MOOD DISTURBANCE, AND ANXIETY: ICD-10-CM

## 2023-02-23 DIAGNOSIS — R56.9 UNSPECIFIED CONVULSIONS: ICD-10-CM

## 2023-02-23 PROCEDURE — G0463: CPT

## 2023-02-23 NOTE — HISTORY OF PRESENT ILLNESS
[FreeTextEntry1] : 2/23/2023\par 87 year old man with pmhx of cerebellar stroke on aspirin and statin presents as a follow up after starting new medication Donepezil for forgetfulness a little over a month ago at his previous appt.. Daughter accompanying patient reports his forgetfulness is about the same. She also reports sleepiness throughout the day. This started before the medication started but seems to have increased. She is concerned that he is by himself for about 5 hours after the home aid leaves and she is as work. She wants to know if we can help achieve a home aid for a longer period of the day. Patient has no new complaints and is pleasant during the exam.  He has clear tearing from eyes that is bothersome to him. Daughter says they have seen an eye doctor and he is going to have a small procedure to help correct this. \par \par 1/11/2023\par 87 year old Left handed man with previous history of cerebellar stroke who arrives today at the recommendation of his PCP Dr. Walter for memory loss. According to daughter, ever since April, 2022, the patient has had waxing waning episodes of forgetfulness. Example include forgetting how to play dominos with his children, heating up food in the microwave much longer than normal, and forgetting how to get to the bathroom. He complains of some lower calf discomfort when he would walk for more than one block. Denies nausea, vomiting, constipation. He had COVID in August, 2022. No recent imaging. The family said that after they moved houses in May, 2022 that it seemed like the patient's symptoms may have gotten worse. \par  \par Of note, the patient was seen in [facility] for acute dysarthria in 9/3/2018. He was found to have a stroke upon imaging which demonstrated bilateral PICA and L PCA distribution infarct, mechanism likely large vessel disease. Patient undergone physical therapy and does not have any residual deficits. The patient was started on aspirin, plavix and atorvastatin but is now only on aspirin and atorvastatin. He received a Holter monitor which demonstrated some ectopy and was started on metoprolol. Per daughter, patient has been taking his medication as prescribed.

## 2023-02-23 NOTE — REVIEW OF SYSTEMS
[Discharge From Eyes] : purulent discharge from the eyes [Feeling Tired] : not feeling tired [Dizziness] : no dizziness [Lightheadedness] : no lightheadedness [Difficulty Walking] : no difficulty walking [Frequent Falls] : not falling [Eye Pain] : no eye pain [Dry Eyes] : no dryness of the eyes [Eyes Itch] : no itching of the eyes

## 2023-02-23 NOTE — PHYSICAL EXAM
[General Appearance - Alert] : alert [General Appearance - In No Acute Distress] : in no acute distress [General Appearance - Well Nourished] : well nourished [General Appearance - Well Developed] : well developed [General Appearance - Well-Appearing] : healthy appearing [Person] : oriented to person [Place] : oriented to place [Visual Intact] : visual attention was ~T not ~L decreased [Naming Objects] : no difficulty naming common objects [Repeating Phrases] : no difficulty repeating a phrase [Fluency] : fluency intact [Cranial Nerves Optic (II)] : visual acuity intact bilaterally,  visual fields full to confrontation, pupils equal round and reactive to light [Cranial Nerves Oculomotor (III)] : extraocular motion intact [Cranial Nerves Trigeminal (V)] : facial sensation intact symmetrically [Cranial Nerves Facial (VII)] : face symmetrical [Cranial Nerves Vestibulocochlear (VIII)] : hearing was intact bilaterally [Cranial Nerves Glossopharyngeal (IX)] : tongue and palate midline [Cranial Nerves Accessory (XI - Cranial And Spinal)] : head turning and shoulder shrug symmetric [Cranial Nerves Hypoglossal (XII)] : there was no tongue deviation with protrusion [Motor Tone] : muscle tone was normal in all four extremities [Motor Strength] : muscle strength was normal in all four extremities [Motor Handedness Left-Handed] : the patient is left hand dominant [Sensation Tactile Decrease] : light touch was intact [Balance] : balance was intact [2+] : Patella left 2+ [0] : Ankle jerk left 0 [PERRL With Normal Accommodation] : pupils were equal in size, round, reactive to light, with normal accommodation [Time] : disoriented to time [Short Term Intact] : short term memory impaired [Concentration Intact] : a decrease in concentrating ability was observed [Paresis Pronator Drift Right-Sided] : no pronator drift on the right [Paresis Pronator Drift Left-Sided] : no pronator drift on the left [Motor Strength Upper Extremities Bilaterally] : strength was normal in both upper extremities [Motor Strength Lower Extremities Bilaterally] : strength was normal in both lower extremities [Coordination - Dysmetria Impaired Finger-to-Nose Bilateral] : not present [Coordination - Dysmetria Impaired Heel-to-Shin Bilateral] : not present [Plantar Reflex Right Only] : normal on the right [Plantar Reflex Left Only] : normal on the left [FreeTextEntry1] : continuous clear tearing from both eyes, left > right

## 2023-02-23 NOTE — DISCUSSION/SUMMARY
[FreeTextEntry1] : 87 year old man with history of cerebellar stroke with no residual deficits presented to the clinic with continued months waxing waning memory loss. CT head reviewed and discussed with patient and daughter. Previous mini-mental exam scored 13. Attempted exam again but very limited due to comprehension issues. Would say around 11-13. 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 \par \par Plan:\par Continue aspirin 81mg daily \par Continue Atorvastatin 80mg daily \par please continue Donepezil 10mg daily\par discussed with Dr. Sparrow about helping with increase length of time with home aid. Patient and daughter must receive that note for PCP\par Discussed with daughter about seeing a neuropsychologist to assess a cognition, this would require medicare for coverage\par Can follow up in 4 months

## 2023-02-27 NOTE — CONSULT NOTE ADULT - REASON FOR ADMISSION
Pt presents to ED with c/o abdominal pain that started yesterday. Pt currently rates pain 6/10. Mom reports no fevers or vomiting but reports multiple episodes of diarrhea.    CVA

## 2023-03-06 ENCOUNTER — APPOINTMENT (OUTPATIENT)
Dept: INTERNAL MEDICINE | Facility: CLINIC | Age: 88
End: 2023-03-06
Payer: MEDICAID

## 2023-03-06 ENCOUNTER — OUTPATIENT (OUTPATIENT)
Dept: OUTPATIENT SERVICES | Facility: HOSPITAL | Age: 88
LOS: 1 days | End: 2023-03-06

## 2023-03-06 VITALS
HEART RATE: 67 BPM | WEIGHT: 150 LBS | BODY MASS INDEX: 23.54 KG/M2 | SYSTOLIC BLOOD PRESSURE: 120 MMHG | HEIGHT: 67 IN | DIASTOLIC BLOOD PRESSURE: 60 MMHG | OXYGEN SATURATION: 95 %

## 2023-03-06 DIAGNOSIS — M27.40 UNSPECIFIED CYST OF JAW: ICD-10-CM

## 2023-03-06 PROCEDURE — 99214 OFFICE O/P EST MOD 30 MIN: CPT | Mod: GC

## 2023-03-13 DIAGNOSIS — R26.89 OTHER ABNORMALITIES OF GAIT AND MOBILITY: ICD-10-CM

## 2023-03-13 DIAGNOSIS — Z00.00 ENCOUNTER FOR GENERAL ADULT MEDICAL EXAMINATION WITHOUT ABNORMAL FINDINGS: ICD-10-CM

## 2023-03-13 DIAGNOSIS — H54.3 UNQUALIFIED VISUAL LOSS, BOTH EYES: ICD-10-CM

## 2023-03-13 DIAGNOSIS — L72.9 FOLLICULAR CYST OF THE SKIN AND SUBCUTANEOUS TISSUE, UNSPECIFIED: ICD-10-CM

## 2023-03-13 DIAGNOSIS — H91.90 UNSPECIFIED HEARING LOSS, UNSPECIFIED EAR: ICD-10-CM

## 2023-03-13 NOTE — HISTORY OF PRESENT ILLNESS
[de-identified] : 86yo M PMHx CVA b/l inferior cerebellar CVA, dementia, HTN, BPH who presents for follow up. Patient reports he has been feeling well since his last visit. In the interm he has seen cardiology, ENT, and neurology. Patient continues to have impaired memory and worsening symptoms of dementia. He follows neurology and was started on donepizil. Patient saw ENT who recommended hearing aids but patient has not received hearing aids yet. He continues to have worsening hearing compared to last visit. Patient reports chronic vision issue L>R for which he follow ophtho and was given eye drops during last visit. Patient states he has also had stable cyst on left groin and right gluteal region. Cysts are non-tender and have not expressed any discharge. Patient's son reports he does not have sufficient balance and needs some assistance when walking outside the house; however, he does not use a cane/walker.

## 2023-03-13 NOTE — PHYSICAL EXAM
[No Acute Distress] : no acute distress [Well Nourished] : well nourished [Well Developed] : well developed [Normal Sclera/Conjunctiva] : normal sclera/conjunctiva [EOMI] : extraocular movements intact [Normal Outer Ear/Nose] : the outer ears and nose were normal in appearance [Normal Oropharynx] : the oropharynx was normal [No JVD] : no jugular venous distention [No Lymphadenopathy] : no lymphadenopathy [Supple] : supple [No Respiratory Distress] : no respiratory distress  [No Accessory Muscle Use] : no accessory muscle use [Clear to Auscultation] : lungs were clear to auscultation bilaterally [Normal Rate] : normal rate  [No Murmur] : no murmur heard [Pedal Pulses Present] : the pedal pulses are present [No Edema] : there was no peripheral edema [Soft] : abdomen soft [Non Tender] : non-tender [Non-distended] : non-distended [No CVA Tenderness] : no CVA  tenderness [No Joint Swelling] : no joint swelling [Grossly Normal Strength/Tone] : grossly normal strength/tone [No Rash] : no rash [No Focal Deficits] : no focal deficits [Speech Grossly Normal] : speech grossly normal [Normal Affect] : the affect was normal [Alert and Oriented x3] : oriented to person, place, and time [de-identified] : Irregular rhythm [de-identified] : paraspinal tenderness to palpation [de-identified] : Dermal cyst in L shine and R gluteus [de-identified] : unsteady gait,

## 2023-03-13 NOTE — REVIEW OF SYSTEMS
[Dryness] : dryness [Vision Problems] : vision problems [Hearing Loss] : hearing loss [Back Pain] : back pain [Confusion] : confusion [Unsteady Walk] : ataxia [Memory Loss] : memory loss [Fever] : no fever [Chills] : no chills [Recent Change In Weight] : ~T no recent weight change [Discharge] : no discharge [Pain] : no pain [Earache] : no earache [Nosebleeds] : no nosebleeds [Nasal Discharge] : no nasal discharge [Sore Throat] : no sore throat [Chest Pain] : no chest pain [Palpitations] : no palpitations [Claudication] : no  leg claudication [Lower Ext Edema] : no lower extremity edema [Shortness Of Breath] : no shortness of breath [Wheezing] : no wheezing [Cough] : no cough [Abdominal Pain] : no abdominal pain [Nausea] : no nausea [Constipation] : no constipation [Diarrhea] : no diarrhea [Vomiting] : no vomiting [Dysuria] : no dysuria [Incontinence] : no incontinence [Hematuria] : no hematuria [Joint Stiffness] : no joint stiffness [Muscle Pain] : no muscle pain [Headache] : no headache [Dizziness] : no dizziness [Suicidal] : not suicidal [Insomnia] : no insomnia

## 2023-03-13 NOTE — ASSESSMENT
[FreeTextEntry1] : RTC in 6 months or sooner if needed\par \par Case d/w Dr. Murry\par \par - Isabella Mckeon, PGY1\par [employer]

## 2023-04-03 ENCOUNTER — NON-APPOINTMENT (OUTPATIENT)
Age: 88
End: 2023-04-03

## 2023-04-12 ENCOUNTER — NON-APPOINTMENT (OUTPATIENT)
Age: 88
End: 2023-04-12

## 2023-04-12 ENCOUNTER — APPOINTMENT (OUTPATIENT)
Dept: CARDIOLOGY | Facility: HOSPITAL | Age: 88
End: 2023-04-12

## 2023-04-12 VITALS
HEIGHT: 67 IN | HEART RATE: 60 BPM | OXYGEN SATURATION: 97 % | SYSTOLIC BLOOD PRESSURE: 130 MMHG | TEMPERATURE: 97.8 F | WEIGHT: 156 LBS | BODY MASS INDEX: 24.48 KG/M2 | DIASTOLIC BLOOD PRESSURE: 78 MMHG

## 2023-04-12 NOTE — HISTORY OF PRESENT ILLNESS
[FreeTextEntry1] : 88M with CVA, dementia, HTN with LVH, BPH who was referred by PCP for an irregularly irregular heart rhythm.\par The patient was seen in medicine clinic 9/14/22, where it was noted that the patient had an irregular heart rhythm on exam. 2 EKGs were done. Initial one showed sinus arrhythmia with PACs, and subsequent one showed sinus arrhythmia. Patient was referred for workup of possible atrial fibrillation given the patient's history of CVA. \par \par Sept 2018 CVA:\par CTA head and neck showed complete versus partially occlusive thrombus in distal (bilateral) vertebral arteries (V4 segments and left vert after origin of PICA), proximal and mid basilar artery with distal reconstitution probably through leptomeningeal collaterals without significant extracranial cerebral large vessel severe stenosis or occlusion. MRI brain on 9/4 showed bilateral PICA distribution and left PCA distribution punctate infarcts and severe leukoaraiosis. 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 symptomatic intracranial atherosclerosis.\par \par 12/14/22: was hypertensive to 162-171/77-93, at the time only taking norvasc 5mg. Ultimately increased to 10mg daily and daughter has brought in a BP log and his blood pressure is generally well controlled with most readings 90s-130s systolic/50s-80s\par Given frequent APCs seen on EKG, Holter was obtained which yielded the following results: Baseline sinus with HR , rare isolated PVCs, frequent APCs nonsustained runs of Atach. Supraventricular beats amounted to burden of 19%, 16 tachycardia events > 120bpm totaling 71 beats. Isolated PVCs 596 total\par \par Today, patient is asymptomatic from palpitations and has no complaints.\par \par Pertinent meds:\par ASA 81QD\par plavix 75 QD\par Toprol 25 QD\par Lipitor 80 QHS\par norvasc 10

## 2023-04-12 NOTE — CARDIOLOGY SUMMARY
[de-identified] : EC22 - sinus with PACs \par  [de-identified] : Baseline sinus with HR , rare isolated PVCs, frequent APCs nonsustained runs of Atach\par Supraventricular beats 19%, 16 tachycardia events > 120bpm totaling 71 beats. \par Isolated PVcs 596 [de-identified] : Echo: DIMENSIONS:\par Dimensions: Normal Values:\par LA: 3.4 cm 2.0 - 4.0 cm\par Ao: 3.2 cm 2.0 - 3.8 cm\par SEPTUM: 1.0 cm 0.6 - 1.2 cm\par PWT: 1.2 cm 0.6 - 1.1 cm\par LVIDd: 4.8 cm 3.0 - 5.6 cm\par LVIDs: 3.4 cm 1.8 - 4.0 cm\par Derived Variables:\par LVMI: 115 g/m2\par RWT: 0.50\par Fractional short: 29 %\par Ejection Fraction (Modified Funez Rule): 68 %\par ------------------------------------------------------------------------\par OBSERVATIONS:\par Mitral Valve: Normal mitral valve. Mild mitral\par regurgitation.\par Aortic Root: Normal aortic root.\par Aortic Valve: Normal trileaflet aortic valve.\par Left Atrium: Normal left atrium. LA volume index = 33\par cc/m2.\par Left Ventricle: Normal left ventricular systolic function.\par No segmental wall motion abnormalities. Mild concentric\par left ventricular hypertrophy. (DT:301 ms).\par Right Heart: Normal right atrium. Normal right ventricular\par size and function. Normal tricuspid valve. Normal pulmonic\par valve.\par Pericardium/PleuraNormal pericardium with no pericardial\par effusion.\par ------------------------------------------------------------------------\par CONCLUSIONS:\par 1. Mild concentric left ventricular hypertrophy.\par 2. Normal left ventricular systolic function. No segmental\par wall motion abnormalities.\par 3. Normal right ventricular size and function.\par ------------------------------------------------------------------------jina Confirmed on 10/12/2022 - 15:29:46 by Mohan Reed M.D.\par ------------------------------------------------------------------------ jina

## 2023-04-12 NOTE — DISCUSSION/SUMMARY
[___ Month(s)] : in [unfilled] month(s) [FreeTextEntry1] : 88M with CVA, dementia, HTN with LVH, BPH who was referred by PCP for an irregularly irregular heart rhythm.\par \par Irregular heart rhythm / CVA: no evidence of atrial fibrillation on ECGs, however do see PACs on multiple ECGs. Additionally, CVA thought to be from atherosclerotic plaque based on neurology's evaluation\par - continue ASA, plavix, Lipitor\par - Holter monitor with frequent ectopy, with low PVC burden (total of 596) and more frequent APCs totaling 19% of beats. While asymptomatic, there runs a risk of arrhythmia induced cardiomyopathy, atrial tachycardia runs, SVT, etc - was started on Toprol 25 QD, and will continue\par - would order another Holter monitor Oct 2023 to evaluate again for afib, and PVC burden\par \par HTN/LVH: \par - continue norvasc 10\par - would order another echo with strain at next visit to r/o amyloid (has LVH, but not needing a lot of medications, although no other sx of amyloid such as neuropathy, carpal tunnel)

## 2023-04-12 NOTE — END OF VISIT
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 Holter monitoring showed frequent atrial ectopy but not atrial fibrillation. Echocardiogram with LVH and normal valves, function. To continue metoprolol for PACs and repeat monitor now to screen for atrial fibrillation.

## 2023-04-12 NOTE — PHYSICAL EXAM
[Well Developed] : well developed [Well Nourished] : well nourished [No Acute Distress] : no acute distress [Normal Conjunctiva] : normal conjunctiva [Normal Venous Pressure] : normal venous pressure [No Carotid Bruit] : no carotid bruit [Normal] : alert and oriented, normal memory [de-identified] : Defers questions to his daughter [de-identified] : Irregularly irregular, no m/r/g

## 2023-04-18 ENCOUNTER — NON-APPOINTMENT (OUTPATIENT)
Age: 88
End: 2023-04-18

## 2023-05-25 ENCOUNTER — APPOINTMENT (OUTPATIENT)
Dept: NEUROLOGY | Facility: HOSPITAL | Age: 88
End: 2023-05-25

## 2023-05-25 ENCOUNTER — OUTPATIENT (OUTPATIENT)
Dept: OUTPATIENT SERVICES | Facility: HOSPITAL | Age: 88
LOS: 1 days | End: 2023-05-25
Payer: MEDICAID

## 2023-05-25 VITALS
HEIGHT: 67 IN | TEMPERATURE: 97.6 F | SYSTOLIC BLOOD PRESSURE: 121 MMHG | WEIGHT: 156 LBS | OXYGEN SATURATION: 97 % | RESPIRATION RATE: 16 BRPM | DIASTOLIC BLOOD PRESSURE: 67 MMHG | BODY MASS INDEX: 24.48 KG/M2 | HEART RATE: 62 BPM

## 2023-05-25 DIAGNOSIS — F03.90 UNSPECIFIED DEMENTIA, UNSPECIFIED SEVERITY, WITHOUT BEHAVIORAL DISTURBANCE, PSYCHOTIC DISTURBANCE, MOOD DISTURBANCE, AND ANXIETY: ICD-10-CM

## 2023-05-25 DIAGNOSIS — I63.9 CEREBRAL INFARCTION, UNSPECIFIED: ICD-10-CM

## 2023-05-25 DIAGNOSIS — R56.9 UNSPECIFIED CONVULSIONS: ICD-10-CM

## 2023-05-25 PROCEDURE — G0463: CPT

## 2023-05-25 NOTE — PHYSICAL EXAM
[General Appearance - Alert] : alert [General Appearance - In No Acute Distress] : in no acute distress [General Appearance - Well Nourished] : well nourished [General Appearance - Well Developed] : well developed [General Appearance - Well-Appearing] : healthy appearing [Person] : oriented to person [Place] : oriented to place [Visual Intact] : visual attention was ~T not ~L decreased [Naming Objects] : no difficulty naming common objects [Repeating Phrases] : no difficulty repeating a phrase [Fluency] : fluency intact [Cranial Nerves Oculomotor (III)] : extraocular motion intact [Cranial Nerves Trigeminal (V)] : facial sensation intact symmetrically [Cranial Nerves Facial (VII)] : face symmetrical [Cranial Nerves Vestibulocochlear (VIII)] : hearing was intact bilaterally [Cranial Nerves Glossopharyngeal (IX)] : tongue and palate midline [Cranial Nerves Accessory (XI - Cranial And Spinal)] : head turning and shoulder shrug symmetric [Cranial Nerves Hypoglossal (XII)] : there was no tongue deviation with protrusion [Motor Tone] : muscle tone was normal in all four extremities [Motor Strength] : muscle strength was normal in all four extremities [Motor Handedness Left-Handed] : the patient is left hand dominant [Sensation Tactile Decrease] : light touch was intact [Balance] : balance was intact [2+] : Patella left 2+ [0] : Ankle jerk left 0 [Time] : disoriented to time [Short Term Intact] : short term memory impaired [Concentration Intact] : a decrease in concentrating ability was observed [Paresis Pronator Drift Right-Sided] : no pronator drift on the right [Paresis Pronator Drift Left-Sided] : no pronator drift on the left [Motor Strength Upper Extremities Bilaterally] : strength was normal in both upper extremities [Motor Strength Lower Extremities Bilaterally] : strength was normal in both lower extremities [Coordination - Dysmetria Impaired Finger-to-Nose Bilateral] : not present [Coordination - Dysmetria Impaired Heel-to-Shin Bilateral] : not present [Plantar Reflex Right Only] : normal on the right [Plantar Reflex Left Only] : normal on the left [FreeTextEntry5] : Dentures missing therefore slight asymmetry, VF intact but has some difficulty with R eye (known) [FreeTextEntry1] : continuous clear tearing from both eyes, left > right

## 2023-05-25 NOTE — DISCUSSION/SUMMARY
[FreeTextEntry1] : 87 year old man with history of cerebellar stroke with no residual deficits presented to the clinic with continued months waxing waning memory loss, daytime sleepiness. CT head reviewed and discussed with patient and daughter. Prior MMSE 13. \par \par Impression \par suspect moderate vs severe dementia but eval for other etiologies\par \par Plan:\par - Continue aspirin 81mg daily from neurovascular standpoint\par - Continue Atorvastatin 80mg daily \par - Cards recs appreciated for monitoring for afib\par - please continue Donepezil 10mg daily\par - sleep referral\par - psych referral\par - discussed with Dr. Sparrow about helping with increase length of time with home aid. Patient and daughter must receive that note for PCP\par - discussed with daughter about seeing a neuropsychologist to assess a cognition, this would require medicare for coverage\par - Can follow up in 6 months or earlier once psych/ sleep evals done. \par \par Patient care discussed with Dr Sparrow and in agreement with above. Discussed care with daughter as well and in agreement. Recs finalized once note signed.

## 2023-05-25 NOTE — REVIEW OF SYSTEMS
[Discharge From Eyes] : purulent discharge from the eyes [Fever] : no fever [Chills] : no chills [Feeling Tired] : not feeling tired [Suicidal] : not suicidal [Dizziness] : no dizziness [Lightheadedness] : no lightheadedness [Difficulty Walking] : no difficulty walking [Frequent Falls] : not falling [Eye Pain] : no eye pain [Dry Eyes] : no dryness of the eyes [Eyes Itch] : no itching of the eyes

## 2023-05-25 NOTE — HISTORY OF PRESENT ILLNESS
[FreeTextEntry1] : 5/25/23: \par Patient briefly 89yo M PMHx cerebellar stroke on asa and statin, presents to neuro clinc with daughter. She reports patient is still sleepy throughout the day (morning, afternoon, evening) and gets irritable when awoken or when she tells him what to do. He states he is well rested. No snoring or other reasons they feel why he sleeps all day. He is mostly sedentary lifestyle (advised to be more active). His appetite, bowel/bladder is okay. Denies any new/ worsening headaches, speech changes, word finding difficulty, extremity weakness/numbness. Not currently seeing/ hearing things that are not there. Daughter had called in 4/2023 for concerns of hallucinations but they have improved since then. He has vision difficulties due to opthalmic issues and is due for surgery in one eye with limited vision. \par  \par 2/23/2023\par 87 year old man with pmhx of cerebellar stroke on aspirin and statin presents as a follow up after starting new medication Donepezil for forgetfulness a little over a month ago at his previous appt.. Daughter accompanying patient reports his forgetfulness is about the same. She also reports sleepiness throughout the day. This started before the medication started but seems to have increased. She is concerned that he is by himself for about 5 hours after the home aid leaves and she is as work. She wants to know if we can help achieve a home aid for a longer period of the day. Patient has no new complaints and is pleasant during the exam.  He has clear tearing from eyes that is bothersome to him. Daughter says they have seen an eye doctor and he is going to have a small procedure to help correct this. \par \par 1/11/2023\par 87 year old Left handed man with previous history of cerebellar stroke who arrives today at the recommendation of his PCP Dr. Walter for memory loss. According to daughter, ever since April, 2022, the patient has had waxing waning episodes of forgetfulness. Example include forgetting how to play dominos with his children, heating up food in the microwave much longer than normal, and forgetting how to get to the bathroom. He complains of some lower calf discomfort when he would walk for more than one block. Denies nausea, vomiting, constipation. He had COVID in August, 2022. No recent imaging. The family said that after they moved houses in May, 2022 that it seemed like the patient's symptoms may have gotten worse. \par  \par Of note, the patient was seen in [facility] for acute dysarthria in 9/3/2018. He was found to have a stroke upon imaging which demonstrated bilateral PICA and L PCA distribution infarct, mechanism likely large vessel disease. Patient undergone physical therapy and does not have any residual deficits. The patient was started on aspirin, plavix and atorvastatin but is now only on aspirin and atorvastatin. He received a Holter monitor which demonstrated some ectopy and was started on metoprolol. Per daughter, patient has been taking his medication as prescribed.

## 2023-05-26 ENCOUNTER — OUTPATIENT (OUTPATIENT)
Dept: OUTPATIENT SERVICES | Facility: HOSPITAL | Age: 88
LOS: 1 days | End: 2023-05-26

## 2023-05-26 ENCOUNTER — APPOINTMENT (OUTPATIENT)
Dept: INTERNAL MEDICINE | Facility: CLINIC | Age: 88
End: 2023-05-26
Payer: MEDICAID

## 2023-05-26 ENCOUNTER — NON-APPOINTMENT (OUTPATIENT)
Age: 88
End: 2023-05-26

## 2023-05-26 VITALS
SYSTOLIC BLOOD PRESSURE: 130 MMHG | WEIGHT: 152 LBS | DIASTOLIC BLOOD PRESSURE: 90 MMHG | HEIGHT: 67 IN | BODY MASS INDEX: 23.86 KG/M2

## 2023-05-26 PROCEDURE — 99214 OFFICE O/P EST MOD 30 MIN: CPT | Mod: GC

## 2023-05-30 ENCOUNTER — NON-APPOINTMENT (OUTPATIENT)
Age: 88
End: 2023-05-30

## 2023-05-30 ENCOUNTER — APPOINTMENT (OUTPATIENT)
Dept: OPHTHALMOLOGY | Facility: CLINIC | Age: 88
End: 2023-05-30
Payer: MEDICARE

## 2023-05-30 PROCEDURE — 92012 INTRM OPH EXAM EST PATIENT: CPT

## 2023-05-30 PROCEDURE — 92136 OPHTHALMIC BIOMETRY: CPT

## 2023-05-30 NOTE — HISTORY OF PRESENT ILLNESS
[Family Member] : family member [FreeTextEntry1] : cysts [de-identified] : 86yo M PMHx CVA b/l inferior cerebellar CVA, dementia, HTN, BPH who presents for follow up. Patient reports he has been feeling well since his last visit. In the interm he has seen cardiology, and neurology.\par \par #Dementia agitation\par Patient continues to have impaired memory and  symptoms of dementia. he would became agitated easily, sleeping a lot during the day.  He follows neurology and was started on donepizil, pending psych and sleep eval, also pending neuropysch eval. He is compliant with medication regime, care given by the offsprings, and have good appetite, can perform ADLs independently. \par \par #Cysts\par Patient's daughter states he has also had increased size of the cyst on left groin and right gluteal region. Cysts are non-tender and have not expressed any discharge. Patient's daughter also reports he couldn't tolerate long walks without affecting his gait. \par

## 2023-05-30 NOTE — PHYSICAL EXAM
[No Acute Distress] : no acute distress [Well Nourished] : well nourished [Well Developed] : well developed [Normal Sclera/Conjunctiva] : normal sclera/conjunctiva [EOMI] : extraocular movements intact [Normal Outer Ear/Nose] : the outer ears and nose were normal in appearance [Normal Oropharynx] : the oropharynx was normal [No JVD] : no jugular venous distention [No Lymphadenopathy] : no lymphadenopathy [Supple] : supple [No Respiratory Distress] : no respiratory distress  [No Accessory Muscle Use] : no accessory muscle use [Clear to Auscultation] : lungs were clear to auscultation bilaterally [Normal Rate] : normal rate  [Regular Rhythm] : with a regular rhythm [Normal S1, S2] : normal S1 and S2 [No Murmur] : no murmur heard [Pedal Pulses Present] : the pedal pulses are present [No Edema] : there was no peripheral edema [Soft] : abdomen soft [Non Tender] : non-tender [Non-distended] : non-distended [No CVA Tenderness] : no CVA  tenderness [No Joint Swelling] : no joint swelling [Grossly Normal Strength/Tone] : grossly normal strength/tone [No Rash] : no rash [No Focal Deficits] : no focal deficits [Speech Grossly Normal] : speech grossly normal [Normal Affect] : the affect was normal [Alert and Oriented x3] : oriented to person, place, and time [de-identified] : paraspinal tenderness to palpation [de-identified] : Dermal cyst nontender in L shine 10cm and R gluteus 5 cm, indurated.  [de-identified] : unsteady gait.

## 2023-05-30 NOTE — ASSESSMENT
[FreeTextEntry1] : RTC in 6 months or sooner if needed\par \par Case d/w Dr. Burris\par \par -Nick Oreilly , PGY1\par [employer]

## 2023-05-31 ENCOUNTER — NON-APPOINTMENT (OUTPATIENT)
Age: 88
End: 2023-05-31

## 2023-05-31 DIAGNOSIS — L72.9 FOLLICULAR CYST OF THE SKIN AND SUBCUTANEOUS TISSUE, UNSPECIFIED: ICD-10-CM

## 2023-05-31 DIAGNOSIS — Z23 ENCOUNTER FOR IMMUNIZATION: ICD-10-CM

## 2023-05-31 DIAGNOSIS — F03.90 UNSPECIFIED DEMENTIA WITHOUT BEHAVIORAL DISTURBANCE: ICD-10-CM

## 2023-05-31 DIAGNOSIS — Z00.00 ENCOUNTER FOR GENERAL ADULT MEDICAL EXAMINATION WITHOUT ABNORMAL FINDINGS: ICD-10-CM

## 2023-05-31 LAB
25(OH)D3 SERPL-MCNC: 56.2 NG/ML
ALBUMIN SERPL ELPH-MCNC: 4.2 G/DL
ALP BLD-CCNC: 73 U/L
ALT SERPL-CCNC: 17 U/L
ANION GAP SERPL CALC-SCNC: 12 MMOL/L
AST SERPL-CCNC: 22 U/L
BILIRUB SERPL-MCNC: 0.5 MG/DL
BUN SERPL-MCNC: 20 MG/DL
CALCIUM SERPL-MCNC: 9.5 MG/DL
CHLORIDE SERPL-SCNC: 106 MMOL/L
CHOLEST SERPL-MCNC: 99 MG/DL
CO2 SERPL-SCNC: 25 MMOL/L
CREAT SERPL-MCNC: 1.41 MG/DL
EGFR: 49 ML/MIN/1.73M2
ESTIMATED AVERAGE GLUCOSE: 123 MG/DL
FERRITIN SERPL-MCNC: 66 NG/ML
FOLATE SERPL-MCNC: >20 NG/ML
GLUCOSE SERPL-MCNC: 100 MG/DL
HBA1C MFR BLD HPLC: 5.9 %
HDLC SERPL-MCNC: 33 MG/DL
LDLC SERPL CALC-MCNC: 49 MG/DL
NONHDLC SERPL-MCNC: 66 MG/DL
POTASSIUM SERPL-SCNC: 4.5 MMOL/L
PROT SERPL-MCNC: 7.1 G/DL
SODIUM SERPL-SCNC: 144 MMOL/L
T PALLIDUM AB SER QL IA: NEGATIVE
TRIGL SERPL-MCNC: 83 MG/DL
TSH SERPL-ACNC: 1.17 UIU/ML
VIT B12 SERPL-MCNC: 986 PG/ML

## 2023-06-30 ENCOUNTER — APPOINTMENT (OUTPATIENT)
Dept: ULTRASOUND IMAGING | Facility: CLINIC | Age: 88
End: 2023-06-30
Payer: MEDICAID

## 2023-06-30 PROCEDURE — 76770 US EXAM ABDO BACK WALL COMP: CPT

## 2023-07-06 ENCOUNTER — OUTPATIENT (OUTPATIENT)
Dept: OUTPATIENT SERVICES | Facility: HOSPITAL | Age: 88
LOS: 1 days | End: 2023-07-06

## 2023-07-06 ENCOUNTER — APPOINTMENT (OUTPATIENT)
Dept: INTERNAL MEDICINE | Facility: CLINIC | Age: 88
End: 2023-07-06
Payer: MEDICARE

## 2023-07-06 ENCOUNTER — LABORATORY RESULT (OUTPATIENT)
Age: 88
End: 2023-07-06

## 2023-07-06 VITALS
BODY MASS INDEX: 24.48 KG/M2 | HEART RATE: 82 BPM | RESPIRATION RATE: 18 BRPM | TEMPERATURE: 97.7 F | HEIGHT: 67 IN | DIASTOLIC BLOOD PRESSURE: 75 MMHG | SYSTOLIC BLOOD PRESSURE: 138 MMHG | WEIGHT: 156 LBS | OXYGEN SATURATION: 98 %

## 2023-07-06 DIAGNOSIS — I10 ESSENTIAL (PRIMARY) HYPERTENSION: ICD-10-CM

## 2023-07-06 DIAGNOSIS — I65.1 OCCLUSION AND STENOSIS OF BASILAR ARTERY: ICD-10-CM

## 2023-07-06 DIAGNOSIS — H90.3 SENSORINEURAL HEARING LOSS, BILATERAL: ICD-10-CM

## 2023-07-06 DIAGNOSIS — K08.109 COMPLETE LOSS OF TEETH, UNSPECIFIED CAUSE, UNSPECIFIED CLASS: ICD-10-CM

## 2023-07-06 DIAGNOSIS — Z86.79 PERSONAL HISTORY OF OTHER DISEASES OF THE CIRCULATORY SYSTEM: ICD-10-CM

## 2023-07-06 DIAGNOSIS — H93.293 OTHER ABNORMAL AUDITORY PERCEPTIONS, BILATERAL: ICD-10-CM

## 2023-07-06 DIAGNOSIS — Z23 ENCOUNTER FOR IMMUNIZATION: ICD-10-CM

## 2023-07-06 DIAGNOSIS — Z87.898 PERSONAL HISTORY OF OTHER SPECIFIED CONDITIONS: ICD-10-CM

## 2023-07-06 DIAGNOSIS — Z00.00 ENCOUNTER FOR GENERAL ADULT MEDICAL EXAMINATION W/OUT ABNORMAL FINDINGS: ICD-10-CM

## 2023-07-06 DIAGNOSIS — Z86.2 PERSONAL HISTORY OF DISEASES OF THE BLOOD AND BLOOD-FORMING ORGANS AND CERTAIN DISORDERS INVOLVING THE IMMUNE MECHANISM: ICD-10-CM

## 2023-07-06 PROCEDURE — 99213 OFFICE O/P EST LOW 20 MIN: CPT | Mod: GE

## 2023-07-07 DIAGNOSIS — Z00.00 ENCOUNTER FOR GENERAL ADULT MEDICAL EXAMINATION WITHOUT ABNORMAL FINDINGS: ICD-10-CM

## 2023-07-07 DIAGNOSIS — N40.0 BENIGN PROSTATIC HYPERPLASIA WITHOUT LOWER URINARY TRACT SYMPTOMS: ICD-10-CM

## 2023-07-07 DIAGNOSIS — N17.9 ACUTE KIDNEY FAILURE, UNSPECIFIED: ICD-10-CM

## 2023-07-07 DIAGNOSIS — I10 ESSENTIAL (PRIMARY) HYPERTENSION: ICD-10-CM

## 2023-07-07 PROBLEM — H93.293 OTHER ABNORMAL AUDITORY PERCEPTIONS, BILATERAL: Status: RESOLVED | Noted: 2022-11-03 | Resolved: 2023-07-07

## 2023-07-07 PROBLEM — H90.3 SENSORINEURAL HEARING LOSS (SNHL) OF BOTH EARS: Status: RESOLVED | Noted: 2022-11-03 | Resolved: 2023-07-07

## 2023-07-07 PROBLEM — Z86.79 HISTORY OF IRREGULAR HEARTBEAT: Status: RESOLVED | Noted: 2022-09-14 | Resolved: 2023-07-07

## 2023-07-07 PROBLEM — I65.1 BASILAR ARTERY OCCLUSION: Status: RESOLVED | Noted: 2018-10-01 | Resolved: 2023-07-07

## 2023-07-07 PROBLEM — Z23 ENCOUNTER FOR IMMUNIZATION: Status: RESOLVED | Noted: 2022-09-14 | Resolved: 2023-07-07

## 2023-07-07 PROBLEM — K08.109 TEETH MISSING: Status: RESOLVED | Noted: 2022-09-14 | Resolved: 2023-07-07

## 2023-07-07 PROBLEM — Z87.898 HISTORY OF UNSTEADY GAIT: Status: RESOLVED | Noted: 2023-03-06 | Resolved: 2023-07-07

## 2023-07-07 PROBLEM — Z86.79 HISTORY OF ABNORMAL ELECTROCARDIOGRAPHY: Status: RESOLVED | Noted: 2023-01-11 | Resolved: 2023-07-07

## 2023-07-07 PROBLEM — Z86.2 HISTORY OF ANEMIA: Status: RESOLVED | Noted: 2022-09-14 | Resolved: 2023-07-07

## 2023-07-07 PROBLEM — Z86.79 HISTORY OF HYPERTENSION: Status: RESOLVED | Noted: 2023-01-11 | Resolved: 2023-07-07

## 2023-07-07 LAB
ANION GAP SERPL CALC-SCNC: 11 MMOL/L
BUN SERPL-MCNC: 14 MG/DL
CALCIUM SERPL-MCNC: 9.6 MG/DL
CHLORIDE SERPL-SCNC: 103 MMOL/L
CO2 SERPL-SCNC: 26 MMOL/L
CREAT SERPL-MCNC: 1.14 MG/DL
EGFR: 63 ML/MIN/1.73M2
GLUCOSE SERPL-MCNC: 97 MG/DL
HCT VFR BLD CALC: 44.6 %
HGB BLD-MCNC: 13.8 G/DL
MCHC RBC-ENTMCNC: 27.1 PG
MCHC RBC-ENTMCNC: 30.9 GM/DL
MCV RBC AUTO: 87.6 FL
PLATELET # BLD AUTO: NORMAL K/UL
POTASSIUM SERPL-SCNC: 4 MMOL/L
RBC # BLD: 5.09 M/UL
RBC # FLD: 14.6 %
SODIUM SERPL-SCNC: 140 MMOL/L
WBC # FLD AUTO: 7.07 K/UL

## 2023-07-07 RX ORDER — DONEPEZIL HYDROCHLORIDE 5 MG/1
5 TABLET ORAL
Qty: 30 | Refills: 1 | Status: DISCONTINUED | COMMUNITY
Start: 2023-01-12 | End: 2023-07-07

## 2023-07-07 NOTE — PHYSICAL EXAM
[No Acute Distress] : no acute distress [Well Nourished] : well nourished [Well Developed] : well developed [Normal Outer Ear/Nose] : the outer ears and nose were normal in appearance [Normal Oropharynx] : the oropharynx was normal [No Lymphadenopathy] : no lymphadenopathy [No Respiratory Distress] : no respiratory distress  [No Accessory Muscle Use] : no accessory muscle use [Clear to Auscultation] : lungs were clear to auscultation bilaterally [Normal Rate] : normal rate  [Regular Rhythm] : with a regular rhythm [No Edema] : there was no peripheral edema [Soft] : abdomen soft [Non Tender] : non-tender [Non-distended] : non-distended [Grossly Normal Strength/Tone] : grossly normal strength/tone [No Rash] : no rash [No Focal Deficits] : no focal deficits [Normal Mood] : the mood was normal [de-identified] : Erythematous right eye, decreased visual acuity

## 2023-07-07 NOTE — HISTORY OF PRESENT ILLNESS
[Preoperative Visit] : for a medical evaluation prior to surgery [Scheduled Procedure ___] : a [unfilled] [Date of Surgery ___] : on [unfilled] [Good] : Good [Electrocardiogram] : ~T an ECG ~C was performed [Metabolic Capacity ___Mets%] : The patient has a metabolic capacity of [unfilled] Mets%  [FreeTextEntry1] : Follow up [de-identified] : 87 yo M PMHx b/l inferior cerebellar CVA, dementia, HTN, BPH who presents for follow up and medical clearance. \par \par The patient is here today to receive medical clearance for upcoming pterygium removal with his ophthalmologist. Patient reports right eye blurry vision started 6 months ago and has been worsening. Denies eye pain or drainage. \par \par Regarding the patient's hypertension, he is currently taking amlodipine 10mg. Home health aide records BP in a book at home, but did not bring it in today. Requires home health aide 7 days a week for 6 hours per day. The patient requires assistance with ADLs and IADLs. He lives with his daughter, who accompanied him to the visit today. \par \par Patient was recently started on Toprol by his Cardiologist for ectopy seen on loop recorder. Denies chest pain, dyspnea on exertion, or lightheadedness.\par \par \par \par \par

## 2023-07-07 NOTE — ASSESSMENT
[FreeTextEntry1] : 89 yo M PMHx b/l inferior cerebellar CVA, dementia, HTN, BPH who presents for follow up and medical clearance. \par \par #Medical Clearance\par - The patient is medically optimized for upcoming low-risk procedure with opthalmology to remove pterygium\par - EKG from 4/2023 is NSR with frequent PACs, unchanged from baseline\par - CBC/BMP obtained today\par - No need to hold any medications prior to procedure on 7/10\par \par #HTN\par - continue with amlodipine, has some bilateral LE swelling which is not bothersome\par - continue with Toprol 25 per Cardiology\par - ECHO ordered today per Cardiology recommendations\par \par #Elevated serum creatinine\par - unclear if this is BRANDON or CKD\par - recent kidney/bladder US with no hydronephrosis but evidence of medical renal disease with prostatomegaly\par - will repeat BMP today, obtain UA and albumin/Cr \par \par #BPH\par - continue with tamsulosin\par - start finasteride 5mg daily today\par \par #Cyst of skin\par - 2 large cysts on L inner thigh and R gluteal region. Cyst is soft, non-tender, non-erythematous w/o overlying skin changes. No discharge noted on examination.\par - Reports size is increasing\par - has upcoming dermatology appointment in the system\par - may need gen surg for I&D as well.\par \par #Dementia\par - microvascular changes seen on past imaging\par - has limitations in ADLs and IADLs for which he has support from a A\par - pending psych, neuropsych eval, sleep eval \par - continue donepezil 10 for now\par - consider assessment with MMSE at next visit if not previously done\par \par Discussed case with Dr. Calderon. \par \par RTC in 3 months or PRN to follow up with PCP. \par \par Iván Viveros, PGY-3\par Internal Medicine\par [employer] \par \par \par \par \par \par

## 2023-07-10 ENCOUNTER — APPOINTMENT (OUTPATIENT)
Dept: OPHTHALMOLOGY | Facility: AMBULATORY SURGERY CENTER | Age: 88
End: 2023-07-10

## 2023-07-11 ENCOUNTER — APPOINTMENT (OUTPATIENT)
Dept: OPHTHALMOLOGY | Facility: CLINIC | Age: 88
End: 2023-07-11

## 2023-07-18 ENCOUNTER — APPOINTMENT (OUTPATIENT)
Dept: OPHTHALMOLOGY | Facility: CLINIC | Age: 88
End: 2023-07-18

## 2023-07-27 ENCOUNTER — APPOINTMENT (OUTPATIENT)
Dept: DERMATOLOGY | Facility: CLINIC | Age: 88
End: 2023-07-27

## 2023-07-28 DIAGNOSIS — R53.81 OTHER MALAISE: ICD-10-CM

## 2023-07-28 NOTE — STROKE CODE NOTE - NIH STROKE SCALE: 1A. LEVEL OF CONSCIOUSNESS, QM
Last office visit date: 7/10/23  Next office visit date: 3/11/24  Medication Refill Protocol Failed.  Protocol approved due to: data identified in chart review.  Patient had normal labs completed in care everywhere.        (0) Alert; keenly responsive

## 2023-08-08 NOTE — PATIENT PROFILE ADULT. - SPECIFY REASON UNABLE TO ASSESS:
Phone: [phone] [facility]           Fax: [phone]                           Outpatient Physical Therapy                                                                            Daily Note    Patient: Luzma Ballesteros : 1948  [facility] #: [id number]   Referring Physician: Concepcion Ng MD  Date: 2023       Treatment Diagnosis: s/p Thoracic Laminectomy    Onset Date: 23  PT Insurance Information: Medicare  Total # of Visits Approved: 22 Per Physician Order  Total # of Visits to Date: 11  No Show: 0  Canceled Appointment: 0    23 Plan of Care/Recert Due    Pre-Treatment Pain:  2/10  Subjective: Patient reports he was sick last week, but didn't feel too bad over the weekend. He reports increased pain yesterday to where he had to take pain medication. He reports increased pain depending the on chair he sits in. He reports he isn't feeling too bad today and rates pain at 2/10. Exercises:  Exercise 2: UBE 8' - 4'fwd/4'bwd, level 2  Exercise 3: rows/ext blue TB 2 x10  Exercise 11: Yellow theraband HAB/ diags 10x2 ea (supine). Exercise 13: Wall push-up 2x10  Exercise 14: Sit to stands 2x10    Manual:  Other: STM to [place]. scap and thoracic region to decrease muscle tightness. Assessment  Assessment: Patient was able to complete exercises, but did report LE weakness and required a couple seated rest breaks. Continued manual techniques to decrease pain. He denied pain following his treatment session. Activity Tolerance  Activity Tolerance: Patient limited by pain    Patient Education  Patient Education: HEP  Pt verbalized/demonstrated good understanding:     [x] Yes         [] No, pt required further clarification.        Post Treatment Pain:  2/10      Plan  Plan Frequency: 2x/week  Plan weeks: 6 weeks       Goals  (Total # of Visits to Date: 6)      Short Term Goals  Time Frame for Short Term Goals: 3 weeks  Short Term Goal 1: Pt to initiate HEP
Family not present at bedside; Pt unable to answer

## 2023-08-11 ENCOUNTER — APPOINTMENT (OUTPATIENT)
Dept: INTERNAL MEDICINE | Facility: CLINIC | Age: 88
End: 2023-08-11
Payer: MEDICAID

## 2023-08-11 ENCOUNTER — OUTPATIENT (OUTPATIENT)
Dept: OUTPATIENT SERVICES | Facility: HOSPITAL | Age: 88
LOS: 1 days | End: 2023-08-11

## 2023-08-11 VITALS
SYSTOLIC BLOOD PRESSURE: 104 MMHG | BODY MASS INDEX: 24.33 KG/M2 | WEIGHT: 155 LBS | DIASTOLIC BLOOD PRESSURE: 74 MMHG | OXYGEN SATURATION: 95 % | HEIGHT: 67 IN | HEART RATE: 51 BPM

## 2023-08-11 DIAGNOSIS — Z71.89 OTHER SPECIFIED COUNSELING: ICD-10-CM

## 2023-08-11 DIAGNOSIS — I49.1 ATRIAL PREMATURE DEPOLARIZATION: ICD-10-CM

## 2023-08-11 DIAGNOSIS — Z87.19 PERSONAL HISTORY OF OTHER DISEASES OF THE DIGESTIVE SYSTEM: ICD-10-CM

## 2023-08-11 DIAGNOSIS — Z86.69 PERSONAL HISTORY OF OTHER DISEASES OF THE NERVOUS SYSTEM AND SENSE ORGANS: ICD-10-CM

## 2023-08-11 DIAGNOSIS — N40.0 BENIGN PROSTATIC HYPERPLASIA WITHOUT LOWER URINARY TRACT SYMPMS: ICD-10-CM

## 2023-08-11 PROCEDURE — 99214 OFFICE O/P EST MOD 30 MIN: CPT

## 2023-08-12 NOTE — END OF VISIT
[] : Resident [FreeTextEntry3] : Pt seen with daughter. On exam left uper inner thigh, 10 x 14 sub-Q mass, c/w lipoma, soft, non-flooculent, non-tender. Second, smaller mass, 4x 6 cm on right buttock, firmer, although still c/w lipoma. No drainage or erythema. Referral to general surgery for possible excision, could do US prior to better assess and confirm dx. Pt with significant SE from and unclear benefit from BB in setting of beign arrhythnmia, found incidentally, would d/c BB.

## 2023-08-12 NOTE — PHYSICAL EXAM
[No Acute Distress] : no acute distress [Well Nourished] : well nourished [Well Developed] : well developed [Normal Outer Ear/Nose] : the outer ears and nose were normal in appearance [Normal Oropharynx] : the oropharynx was normal [No Lymphadenopathy] : no lymphadenopathy [No Respiratory Distress] : no respiratory distress  [No Accessory Muscle Use] : no accessory muscle use [Clear to Auscultation] : lungs were clear to auscultation bilaterally [Normal Rate] : normal rate  [Regular Rhythm] : with a regular rhythm [No Edema] : there was no peripheral edema [Soft] : abdomen soft [Non Tender] : non-tender [Non-distended] : non-distended [Grossly Normal Strength/Tone] : grossly normal strength/tone [No Rash] : no rash [No Focal Deficits] : no focal deficits [Normal Mood] : the mood was normal [de-identified] : decreased visual acuity bilaterally [de-identified] : Large left thigh lipoma, smaller & firmer mass on right buttock

## 2023-08-12 NOTE — HISTORY OF PRESENT ILLNESS
[FreeTextEntry1] : Follow up  [de-identified] : 89 yo M PMHx of b/l inferior cerebellar CVA, dementia, HTN, BPH who presents for follow up. Accompanied by daughter, Neyda, today.  Interim: Patient was denied by ophthalmologist's office for pterygium removal per daughter. Reports persistent bilateral blurry vision, watery eyes, sometimes bumping into walls. No falls.  #Dementia - currently on donepezil 10mg - follows with Neurology #Hypertension - currently taking amlodipine 10mg #BPH - currently on tamsulosin and finasteride - reports nocturia 2-3x per night #Skin cysts - located on right buttock and left thigh, enlarging in size - not painful per patient, but daughter thinks they bother the patient affecting his positioning #Ectopy - Patient was recently started on Toprol by his Cardiologist for ectopy seen on loop recorder. Denies chest pain, dyspnea on exertion, or lightheadedness. No palpitations.  #ACP - Requires home health aide 7 days a week for 7 hours per day  - The patient requires assistance with ADLs and IADLs - Does not have HCP although daughter reports it would be her

## 2023-08-14 DIAGNOSIS — N40.0 BENIGN PROSTATIC HYPERPLASIA WITHOUT LOWER URINARY TRACT SYMPTOMS: ICD-10-CM

## 2023-08-14 DIAGNOSIS — Z71.89 OTHER SPECIFIED COUNSELING: ICD-10-CM

## 2023-08-14 DIAGNOSIS — F03.90 UNSPECIFIED DEMENTIA WITHOUT BEHAVIORAL DISTURBANCE: ICD-10-CM

## 2023-08-14 DIAGNOSIS — L72.9 FOLLICULAR CYST OF THE SKIN AND SUBCUTANEOUS TISSUE, UNSPECIFIED: ICD-10-CM

## 2023-08-14 DIAGNOSIS — H54.3 UNQUALIFIED VISUAL LOSS, BOTH EYES: ICD-10-CM

## 2023-08-22 ENCOUNTER — APPOINTMENT (OUTPATIENT)
Dept: OPHTHALMOLOGY | Facility: CLINIC | Age: 88
End: 2023-08-22

## 2023-08-31 ENCOUNTER — APPOINTMENT (OUTPATIENT)
Dept: CARDIOLOGY | Facility: HOSPITAL | Age: 88
End: 2023-08-31

## 2023-08-31 ENCOUNTER — NON-APPOINTMENT (OUTPATIENT)
Age: 88
End: 2023-08-31

## 2023-08-31 VITALS
SYSTOLIC BLOOD PRESSURE: 110 MMHG | HEART RATE: 64 BPM | HEIGHT: 67 IN | WEIGHT: 153 LBS | BODY MASS INDEX: 24.01 KG/M2 | OXYGEN SATURATION: 96 % | DIASTOLIC BLOOD PRESSURE: 55 MMHG

## 2023-08-31 RX ORDER — METOPROLOL SUCCINATE 25 MG/1
25 TABLET, EXTENDED RELEASE ORAL DAILY
Qty: 45 | Refills: 0 | Status: DISCONTINUED | COMMUNITY
Start: 2023-01-11 | End: 2023-08-31

## 2023-08-31 NOTE — HISTORY OF PRESENT ILLNESS
[FreeTextEntry1] : Mr. 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 He was last seen by Dr. Noriega after he was found to have abnormal ECG with high burden of PACs on remote monitoring with 19% of supraventricular beats. Patient was overall asymptomatic from these ectopic beats and he has been on toprolol 25mg to suppress his ectopy. He presents today with persistent fatigue and overall decreased exercise tolerance. He denies any associated chest pain, palpitations or lightheadedness. As per his daughter he spends most of his days asleep and is not particularly motivated to be active due to lower back pain.    Cardiology Summary     ECG: EC22 - sinus with PACs    Remote/Ambulatory Rhythm Monitoring: Baseline sinus with HR , rare isolated PVCs, frequent APCs nonsustained runs of Atach. Supraventricular beats amounted to burden of 19%, 16 tachycardia events > 120bpm totaling 71 beats. Isolated PVCs 596 total  Echo:  Echo 10/12/2022 CONCLUSIONS:  1. Mild concentric left ventricular hypertrophy. 2. Normal left ventricular systolic function. No segmental wall motion abnormalities. 3. Normal right ventricular size and function.

## 2023-08-31 NOTE — CARDIOLOGY SUMMARY
[de-identified] : ECG: EC22 - sinus with PACs    [de-identified] :  Remote/Ambulatory Rhythm Monitoring: Baseline sinus with HR , rare isolated PVCs, frequent APCs nonsustained runs of Atach  Supraventricular beats 19%, 16 tachycardia events > 120bpm totaling 71 beats.  Isolated PVcs 596   [de-identified] : Echo: DIMENSIONS:  Dimensions: Normal Values:  LA: 3.4 cm 2.0 - 4.0 cm  Ao: 3.2 cm 2.0 - 3.8 cm  SEPTUM: 1.0 cm 0.6 - 1.2 cm  PWT: 1.2 cm 0.6 - 1.1 cm  LVIDd: 4.8 cm 3.0 - 5.6 cm  LVIDs: 3.4 cm 1.8 - 4.0 cm  Derived Variables:  LVMI: 115 g/m2  RWT: 0.50  Fractional short: 29 %  Ejection Fraction (Modified Funez Rule): 68 %  ------------------------------------------------------------------------  OBSERVATIONS:  Mitral Valve: Normal mitral valve. Mild mitral  regurgitation.  Aortic Root: Normal aortic root.  Aortic Valve: Normal trileaflet aortic valve.  Left Atrium: Normal left atrium. LA volume index = 33  cc/m2.  Left Ventricle: Normal left ventricular systolic function.  No segmental wall motion abnormalities. Mild concentric  left ventricular hypertrophy. (DT:301 ms).  Right Heart: Normal right atrium. Normal right ventricular  size and function. Normal tricuspid valve. Normal pulmonic  valve.  Pericardium/PleuraNormal pericardium with no pericardial  effusion.  ------------------------------------------------------------------------  CONCLUSIONS:  1. Mild concentric left ventricular hypertrophy.  2. Normal left ventricular systolic function. No segmental  wall motion abnormalities.  3. Normal right ventricular size and function.

## 2023-08-31 NOTE — PHYSICAL EXAM

## 2023-08-31 NOTE — DISCUSSION/SUMMARY
[EKG obtained to assist in diagnosis and management of assessed problem(s)] : EKG obtained to assist in diagnosis and management of assessed problem(s) [FreeTextEntry1] : Mr. 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 Patient was overall asymptomatic from these ectopic beats and he has been on toprolol 25mg to suppress his ectopy. He presents today with persistent fatigue and overall decreased exercise tolerance.   Fatigue, decreased exercise tolerance.  - Would stop beta-blocker and follow up in 2 weeks over the phone to evaluate symptomatology - He does not appear to be in heart failure  - Should his symptoms overall remain unchanged off metoprolol, would favor restarting the medication given his high burden of PAC's (ECG today with APCs patient is asymptomatic)  Frequent asymptomatic PAC's ECG today: Sinus with PACs Noted to have high burden on holter monitor: Will monitor off beta-blockers for now  CVA - BP control - Aspirin 81mg dialy - Lipitor 80mg daily  HTN - Well controlled: Home readings systolic range: 105-140 - Continue amlodipine 10mg daily  RTC in 3 months  Clare Simpson MD Cardiology Fellow

## 2023-08-31 NOTE — END OF VISIT
[] : Fellow [FreeTextEntry3] : The patient is an 88-year-old man with a history of stroke without residual deficits and hypertension who presents for routine follow-up. The patient and his daughter report that the patient is very tired and fatigued persistently, perhaps with a low mood. He was previously started on metoprolol to suppress asymptomatic APC. We will trial him off of this drug for two weeks. Dr. Simpson will call to assess him in the interim and he can be followed in two months.  Sandip Salcido MD Cardiology

## 2023-09-14 ENCOUNTER — APPOINTMENT (OUTPATIENT)
Dept: INTERNAL MEDICINE | Facility: CLINIC | Age: 88
End: 2023-09-14

## 2023-10-02 ENCOUNTER — NON-APPOINTMENT (OUTPATIENT)
Age: 88
End: 2023-10-02

## 2023-10-02 ENCOUNTER — APPOINTMENT (OUTPATIENT)
Dept: PULMONOLOGY | Facility: CLINIC | Age: 88
End: 2023-10-02
Payer: MEDICAID

## 2023-10-02 VITALS
HEIGHT: 67 IN | RESPIRATION RATE: 16 BRPM | SYSTOLIC BLOOD PRESSURE: 114 MMHG | HEART RATE: 72 BPM | OXYGEN SATURATION: 99 % | DIASTOLIC BLOOD PRESSURE: 70 MMHG | BODY MASS INDEX: 24.64 KG/M2 | WEIGHT: 157 LBS

## 2023-10-02 PROCEDURE — 99203 OFFICE O/P NEW LOW 30 MIN: CPT

## 2023-10-15 ENCOUNTER — APPOINTMENT (OUTPATIENT)
Dept: SLEEP CENTER | Facility: CLINIC | Age: 88
End: 2023-10-15
Payer: MEDICAID

## 2023-10-15 ENCOUNTER — OUTPATIENT (OUTPATIENT)
Dept: OUTPATIENT SERVICES | Facility: HOSPITAL | Age: 88
LOS: 1 days | End: 2023-10-15
Payer: MEDICAID

## 2023-10-15 PROCEDURE — 95810 POLYSOM 6/> YRS 4/> PARAM: CPT | Mod: 26

## 2023-10-15 PROCEDURE — 95810 POLYSOM 6/> YRS 4/> PARAM: CPT

## 2023-10-20 ENCOUNTER — APPOINTMENT (OUTPATIENT)
Dept: INTERNAL MEDICINE | Facility: CLINIC | Age: 88
End: 2023-10-20

## 2023-10-24 DIAGNOSIS — G47.33 OBSTRUCTIVE SLEEP APNEA (ADULT) (PEDIATRIC): ICD-10-CM

## 2023-10-25 ENCOUNTER — APPOINTMENT (OUTPATIENT)
Dept: PULMONOLOGY | Facility: CLINIC | Age: 88
End: 2023-10-25
Payer: MEDICAID

## 2023-10-25 DIAGNOSIS — G47.33 OBSTRUCTIVE SLEEP APNEA (ADULT) (PEDIATRIC): ICD-10-CM

## 2023-10-25 PROCEDURE — 99442: CPT

## 2023-10-27 ENCOUNTER — APPOINTMENT (OUTPATIENT)
Dept: SLEEP CENTER | Facility: CLINIC | Age: 88
End: 2023-10-27

## 2023-11-30 ENCOUNTER — APPOINTMENT (OUTPATIENT)
Dept: CARDIOLOGY | Facility: HOSPITAL | Age: 88
End: 2023-11-30

## 2023-12-29 ENCOUNTER — APPOINTMENT (OUTPATIENT)
Dept: INTERNAL MEDICINE | Facility: CLINIC | Age: 88
End: 2023-12-29
Payer: MEDICARE

## 2023-12-29 ENCOUNTER — RESULT REVIEW (OUTPATIENT)
Age: 88
End: 2023-12-29

## 2023-12-29 ENCOUNTER — OUTPATIENT (OUTPATIENT)
Dept: OUTPATIENT SERVICES | Facility: HOSPITAL | Age: 88
LOS: 1 days | End: 2023-12-29

## 2023-12-29 VITALS
HEART RATE: 79 BPM | HEIGHT: 67 IN | WEIGHT: 157 LBS | OXYGEN SATURATION: 98 % | DIASTOLIC BLOOD PRESSURE: 80 MMHG | BODY MASS INDEX: 24.64 KG/M2 | SYSTOLIC BLOOD PRESSURE: 132 MMHG

## 2023-12-29 DIAGNOSIS — Z00.00 ENCOUNTER FOR GENERAL ADULT MEDICAL EXAMINATION W/OUT ABNORMAL FINDINGS: ICD-10-CM

## 2023-12-29 DIAGNOSIS — H61.21 IMPACTED CERUMEN, RIGHT EAR: ICD-10-CM

## 2023-12-29 DIAGNOSIS — F03.90 UNSPECIFIED DEMENTIA W/OUT BEHAVIORAL DISTURBANCE: ICD-10-CM

## 2023-12-29 DIAGNOSIS — N17.9 ACUTE KIDNEY FAILURE, UNSPECIFIED: ICD-10-CM

## 2023-12-29 DIAGNOSIS — I49.1 ATRIAL PREMATURE DEPOLARIZATION: ICD-10-CM

## 2023-12-29 DIAGNOSIS — H54.3 UNQUALIFIED VISUAL LOSS, BOTH EYES: ICD-10-CM

## 2023-12-29 DIAGNOSIS — L72.9 FOLLICULAR CYST OF THE SKIN AND SUBCUTANEOUS TISSUE, UNSPECIFIED: ICD-10-CM

## 2023-12-29 DIAGNOSIS — Z86.79 PERSONAL HISTORY OF OTHER DISEASES OF THE CIRCULATORY SYSTEM: ICD-10-CM

## 2023-12-29 DIAGNOSIS — I63.22 CEREBRAL INFARCTION DUE TO UNSPECIFIED OCCLUSION OR STENOSIS OF BASILAR ARTERY: ICD-10-CM

## 2023-12-29 DIAGNOSIS — R26.89 OTHER ABNORMALITIES OF GAIT AND MOBILITY: ICD-10-CM

## 2023-12-29 PROCEDURE — 99213 OFFICE O/P EST LOW 20 MIN: CPT | Mod: GE

## 2023-12-29 RX ORDER — ATORVASTATIN CALCIUM 80 MG/1
80 TABLET, FILM COATED ORAL
Qty: 90 | Refills: 1 | Status: ACTIVE | COMMUNITY
Start: 2022-09-14 | End: 1900-01-01

## 2023-12-29 RX ORDER — FINASTERIDE 5 MG/1
5 TABLET, FILM COATED ORAL DAILY
Qty: 90 | Refills: 1 | Status: ACTIVE | COMMUNITY
Start: 2023-07-06 | End: 1900-01-01

## 2023-12-29 RX ORDER — DONEPEZIL HYDROCHLORIDE 10 MG/1
10 TABLET ORAL DAILY
Qty: 90 | Refills: 1 | Status: ACTIVE | COMMUNITY
Start: 2022-09-14 | End: 1900-01-01

## 2023-12-29 RX ORDER — AMLODIPINE BESYLATE 10 MG/1
10 TABLET ORAL DAILY
Qty: 30 | Refills: 11 | Status: ACTIVE | COMMUNITY
Start: 2022-12-14 | End: 1900-01-01

## 2023-12-29 RX ORDER — TAMSULOSIN HYDROCHLORIDE 0.4 MG/1
0.4 CAPSULE ORAL
Qty: 90 | Refills: 1 | Status: ACTIVE | COMMUNITY
Start: 2022-09-14 | End: 1900-01-01

## 2023-12-29 RX ORDER — ASPIRIN 81 MG/1
81 TABLET, CHEWABLE ORAL
Qty: 90 | Refills: 1 | Status: ACTIVE | COMMUNITY
Start: 2022-09-14 | End: 1900-01-01

## 2023-12-29 RX ORDER — POLYETHYLENE GLYCOL 3350 17 G/17G
17 POWDER, FOR SOLUTION ORAL DAILY
Qty: 1 | Refills: 3 | Status: ACTIVE | COMMUNITY
Start: 2022-09-14 | End: 1900-01-01

## 2023-12-29 NOTE — ASSESSMENT
[FreeTextEntry1] : 87 yo M PMHx b/l inferior cerebellar CVA, dementia, HTN, BPH who presents for follow up.  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 (left thigh and right buttock) - patient and daughter interested in removal - provided repeat ultrasound and general surgery referral to evaluate for excision  #Ectopy - recommend routine follow up with Cardiology to assess PAC/PVC burden  - continue to monitor off beta-blocker for now  #Hearing loss - provided audiology referral today for repeat hearing evaluation - was recommended hearing aids but does not currently use, instructed daughter that patient would benefit socially and cognitively from their use  #Dementia - suspect multifactorial from vascular etiology and component of Alzheimer's  - on donepezil per Neurology  #Hypertension - continue with amlodipine 10mg  #BPH - continue with tamsulosin and finasteride  #ACP - address formal paperwork for HCP and MOLST at next visit  Discussed case with Dr. Willis.  RTC in 3 months to f/u Ophtho procedure, skin cyst ultrasound, surgery referral, audiologist, and lower back pain.   Iván Viveros, PGY-3 Internal Medicine [employer] [facility]

## 2023-12-29 NOTE — END OF VISIT
[] : Resident [FreeTextEntry3] : Low back pain brought up by patient, unclear if new onset or long standing. As discussed with resident will obtain labs with serum to be drawn in office today primarily cbc and cmp for further evaluation.  Will continue to monitor for now, consider imaging if warranted. Lazaro

## 2023-12-29 NOTE — PHYSICAL EXAM
[No Acute Distress] : no acute distress [Well Nourished] : well nourished [Well Developed] : well developed [PERRL] : pupils equal round and reactive to light [EOMI] : extraocular movements intact [Normal Outer Ear/Nose] : the outer ears and nose were normal in appearance [Normal Oropharynx] : the oropharynx was normal [No Lymphadenopathy] : no lymphadenopathy [No Respiratory Distress] : no respiratory distress  [No Accessory Muscle Use] : no accessory muscle use [Clear to Auscultation] : lungs were clear to auscultation bilaterally [Normal Rate] : normal rate  [Regular Rhythm] : with a regular rhythm [No Edema] : there was no peripheral edema [Soft] : abdomen soft [Non Tender] : non-tender [Non-distended] : non-distended [No Joint Swelling] : no joint swelling [No Rash] : no rash [No Focal Deficits] : no focal deficits [Alert and Oriented x3] : oriented to person, place, and time

## 2023-12-29 NOTE — HISTORY OF PRESENT ILLNESS
[FreeTextEntry1] : Follow up [de-identified] : 89 yo M PMHx of b/l inferior cerebellar CVA, dementia, HTN, BPH who presents for follow up. Accompanied by daughter, Neyda, today.  Interim: Patient reports lower back pain for past few months. Denies numbness, tingling, weakness. No falls. Still with poor vision, has not been able to establish with an Ophthalmologist. Recently diagnosed with sleep apnea, plans to obtain CPAP. Reports good appetite, stable weight. Intermittent hearing loss per daughter.   #Back pain - no red flag symptoms although elderly in age - walks with a cane  #Skin cysts - located on right buttock and left thigh, continues to enlarge in size per daughter - did not obtain ultrasound or establish with general surgery since last visit  #Ectopy - asymptomatic off beta-blocker - follows with Cardiology  #Hearing loss - recommended hearing aides previously but did not follow up  #Dementia - currently on donepezil 10mg - follows with Neurology  #Hypertension - currently taking amlodipine 10mg  #BPH - currently on tamsulosin and finasteride - reports nocturia 2-3x per night  #ACP - Requires home health aide 7 days a week for 7 hours per day - The patient requires assistance with ADLs and IADLs - Does not have official HCP although daughter reports it would be her

## 2024-01-03 LAB
ALBUMIN SERPL ELPH-MCNC: 4.1 G/DL
ALP BLD-CCNC: 78 U/L
ALT SERPL-CCNC: 17 U/L
ANION GAP SERPL CALC-SCNC: 11 MMOL/L
AST SERPL-CCNC: 25 U/L
BILIRUB SERPL-MCNC: 0.4 MG/DL
BUN SERPL-MCNC: 17 MG/DL
CALCIUM SERPL-MCNC: 9.3 MG/DL
CHLORIDE SERPL-SCNC: 103 MMOL/L
CO2 SERPL-SCNC: 25 MMOL/L
CREAT SERPL-MCNC: 1.11 MG/DL
EGFR: 65 ML/MIN/1.73M2
GLUCOSE SERPL-MCNC: 85 MG/DL
HCT VFR BLD CALC: 38.7 %
HGB BLD-MCNC: 12.2 G/DL
MCHC RBC-ENTMCNC: 27.7 PG
MCHC RBC-ENTMCNC: 31.5 GM/DL
MCV RBC AUTO: 87.8 FL
PLATELET # BLD AUTO: 161 K/UL
POTASSIUM SERPL-SCNC: 4.7 MMOL/L
PROT SERPL-MCNC: 7.2 G/DL
RBC # BLD: 4.41 M/UL
RBC # FLD: 14.5 %
SODIUM SERPL-SCNC: 139 MMOL/L
WBC # FLD AUTO: 5.71 K/UL

## 2024-01-04 DIAGNOSIS — I49.1 ATRIAL PREMATURE DEPOLARIZATION: ICD-10-CM

## 2024-01-04 DIAGNOSIS — H54.3 UNQUALIFIED VISUAL LOSS, BOTH EYES: ICD-10-CM

## 2024-01-04 DIAGNOSIS — L72.9 FOLLICULAR CYST OF THE SKIN AND SUBCUTANEOUS TISSUE, UNSPECIFIED: ICD-10-CM

## 2024-01-04 DIAGNOSIS — F03.90 UNSPECIFIED DEMENTIA WITHOUT BEHAVIORAL DISTURBANCE: ICD-10-CM

## 2024-01-04 DIAGNOSIS — Z00.00 ENCOUNTER FOR GENERAL ADULT MEDICAL EXAMINATION WITHOUT ABNORMAL FINDINGS: ICD-10-CM

## 2024-01-11 ENCOUNTER — NON-APPOINTMENT (OUTPATIENT)
Age: 89
End: 2024-01-11

## 2024-01-11 ENCOUNTER — APPOINTMENT (OUTPATIENT)
Dept: CARDIOLOGY | Facility: HOSPITAL | Age: 89
End: 2024-01-11

## 2024-01-11 VITALS
TEMPERATURE: 97 F | SYSTOLIC BLOOD PRESSURE: 147 MMHG | HEART RATE: 70 BPM | DIASTOLIC BLOOD PRESSURE: 89 MMHG | OXYGEN SATURATION: 98 % | WEIGHT: 157 LBS | HEIGHT: 67 IN | BODY MASS INDEX: 24.64 KG/M2

## 2024-01-11 NOTE — ASSESSMENT
[FreeTextEntry1] : Mr. 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 Has a high PAC burden, but asymptomatic with normal echo. Not tolerating metop due to extreme fatigue.  Recs: -would favor continuing to hold metoprolol due to extreme fatigue, likely related to cognitive slowing -recommend neuropsych eval -c/w ASA, lipitor -c/w amlo 10, BPs are well controlled at home  RTC as needed

## 2024-01-11 NOTE — HISTORY OF PRESENT ILLNESS
[FreeTextEntry1] : Mr. 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 Cardiology has been following for high PAC burden (19% on remote monitoring) that is asymptomatic. Has not tolerated toprol due to extreme fatigue. Has still bee fatigued even off metoprolol, as well as having hallucinations and mental slowing. Recent labs with normal TSH.   Remote/Ambulatory Rhythm Monitoring: Baseline sinus with HR , rare isolated PVCs, frequent APCs nonsustained runs of Atach. Supraventricular beats amounted to burden of 19%, 16 tachycardia events > 120bpm totaling 71 beats. Isolated PVCs 596 total  Echo: Echo 10/12/2022 CONCLUSIONS:  1. Mild concentric left ventricular hypertrophy. 2. Normal left ventricular systolic function. No segmental wall motion abnormalities. 3. Normal right ventricular size and function.

## 2024-01-23 ENCOUNTER — APPOINTMENT (OUTPATIENT)
Dept: NEUROLOGY | Facility: HOSPITAL | Age: 89
End: 2024-01-23

## 2024-02-01 ENCOUNTER — APPOINTMENT (OUTPATIENT)
Dept: ULTRASOUND IMAGING | Facility: CLINIC | Age: 89
End: 2024-02-01

## 2024-02-03 ENCOUNTER — APPOINTMENT (OUTPATIENT)
Dept: ULTRASOUND IMAGING | Facility: IMAGING CENTER | Age: 89
End: 2024-02-03
Payer: MEDICARE

## 2024-02-03 ENCOUNTER — OUTPATIENT (OUTPATIENT)
Dept: OUTPATIENT SERVICES | Facility: HOSPITAL | Age: 89
LOS: 1 days | End: 2024-02-03
Payer: COMMERCIAL

## 2024-02-03 DIAGNOSIS — L72.9 FOLLICULAR CYST OF THE SKIN AND SUBCUTANEOUS TISSUE, UNSPECIFIED: ICD-10-CM

## 2024-02-03 DIAGNOSIS — Z00.8 ENCOUNTER FOR OTHER GENERAL EXAMINATION: ICD-10-CM

## 2024-02-03 PROCEDURE — 76882 US LMTD JT/FCL EVL NVASC XTR: CPT | Mod: 26,LT

## 2024-02-03 PROCEDURE — 76882 US LMTD JT/FCL EVL NVASC XTR: CPT

## 2024-02-06 ENCOUNTER — OUTPATIENT (OUTPATIENT)
Dept: OUTPATIENT SERVICES | Facility: HOSPITAL | Age: 89
LOS: 1 days | End: 2024-02-06
Payer: MEDICAID

## 2024-02-06 ENCOUNTER — APPOINTMENT (OUTPATIENT)
Dept: NEUROLOGY | Facility: HOSPITAL | Age: 89
End: 2024-02-06

## 2024-02-06 VITALS — HEART RATE: 87 BPM | DIASTOLIC BLOOD PRESSURE: 82 MMHG | SYSTOLIC BLOOD PRESSURE: 148 MMHG | TEMPERATURE: 97.8 F

## 2024-02-06 DIAGNOSIS — I63.9 CEREBRAL INFARCTION, UNSPECIFIED: ICD-10-CM

## 2024-02-06 DIAGNOSIS — R51.9 HEADACHE, UNSPECIFIED: ICD-10-CM

## 2024-02-06 LAB
FOLATE SERPL-MCNC: >20 NG/ML — SIGNIFICANT CHANGE UP
T4 FREE+ TSH PNL SERPL: 1.43 UIU/ML — SIGNIFICANT CHANGE UP (ref 0.27–4.2)
VIT B12 SERPL-MCNC: 959 PG/ML — SIGNIFICANT CHANGE UP (ref 232–1245)

## 2024-02-06 PROCEDURE — 84443 ASSAY THYROID STIM HORMONE: CPT

## 2024-02-06 PROCEDURE — 87086 URINE CULTURE/COLONY COUNT: CPT

## 2024-02-06 PROCEDURE — 82746 ASSAY OF FOLIC ACID SERUM: CPT

## 2024-02-06 PROCEDURE — 82607 VITAMIN B-12: CPT

## 2024-02-06 PROCEDURE — G0463: CPT

## 2024-02-06 PROCEDURE — 36415 COLL VENOUS BLD VENIPUNCTURE: CPT

## 2024-02-06 PROCEDURE — 81001 URINALYSIS AUTO W/SCOPE: CPT

## 2024-02-06 RX ORDER — QUETIAPINE FUMARATE 25 MG/1
25 TABLET ORAL 3 TIMES DAILY
Qty: 90 | Refills: 3 | Status: ACTIVE | COMMUNITY
Start: 2024-02-06 | End: 1900-01-01

## 2024-02-07 LAB
APPEARANCE UR: CLEAR — SIGNIFICANT CHANGE UP
BACTERIA # UR AUTO: NEGATIVE /HPF — SIGNIFICANT CHANGE UP
BILIRUB UR-MCNC: NEGATIVE — SIGNIFICANT CHANGE UP
CAST: 6 /LPF — HIGH (ref 0–4)
COLOR SPEC: SIGNIFICANT CHANGE UP
DIFF PNL FLD: NEGATIVE — SIGNIFICANT CHANGE UP
GLUCOSE UR QL: NEGATIVE MG/DL — SIGNIFICANT CHANGE UP
HYALINE CASTS # UR AUTO: PRESENT
KETONES UR-MCNC: ABNORMAL MG/DL
LEUKOCYTE ESTERASE UR-ACNC: ABNORMAL
MUCOUS THREADS # UR AUTO: PRESENT
NITRITE UR-MCNC: NEGATIVE — SIGNIFICANT CHANGE UP
PH UR: 6 — SIGNIFICANT CHANGE UP (ref 5–8)
PROT UR-MCNC: SIGNIFICANT CHANGE UP MG/DL
RBC CASTS # UR COMP ASSIST: SIGNIFICANT CHANGE UP /HPF
REVIEW: SIGNIFICANT CHANGE UP
SP GR SPEC: 1.03 — SIGNIFICANT CHANGE UP (ref 1–1.03)
SQUAMOUS # UR AUTO: 1 /HPF — SIGNIFICANT CHANGE UP (ref 0–5)
UROBILINOGEN FLD QL: 1 MG/DL — SIGNIFICANT CHANGE UP (ref 0.2–1)
WBC UR QL: 1 /HPF — SIGNIFICANT CHANGE UP (ref 0–5)

## 2024-02-08 LAB
CULTURE RESULTS: SIGNIFICANT CHANGE UP
SPECIMEN SOURCE: SIGNIFICANT CHANGE UP

## 2024-02-09 DIAGNOSIS — D17.9 BENIGN LIPOMATOUS NEOPLASM, UNSPECIFIED: ICD-10-CM

## 2024-02-13 ENCOUNTER — NON-APPOINTMENT (OUTPATIENT)
Age: 89
End: 2024-02-13

## 2024-02-13 DIAGNOSIS — I63.9 CEREBRAL INFARCTION, UNSPECIFIED: ICD-10-CM

## 2024-02-13 NOTE — ASSESSMENT
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 Daytime fatigue recent dx SEDA pending CPAP  Impression Delerium on Moderate - Severe Dementia, likely multifactorial  Plan: - Continue aspirin 81mg daily from neurovascular standpoint - Continue Atorvastatin 80mg daily - Continue Donepezil 10mg daily - Check B12, TSH, UA - Seroquel 25mg TID PRN for agitation / hallucinations - Counseled family on frequent reorientation, protecting circadian rhythm  - RTC 6 months  Patient care discussed with Dr Nissenbaum and in agreement with above. Discussed care with daughter as well and in agreement.

## 2024-02-13 NOTE — PHYSICAL EXAM
[FreeTextEntry1] : Constitutional: well-developed, well-nourished, well-groomed Eyes: ophthalmoscopic exam deferred secondary to COVID-19 pandemic Cardiovascular: no swelling, warm-to-touch  Neurological Examination:  - Mental Status:  Alert, awake, oriented to person, and place Not oriented to time *says january later says its febuary though) does not know year, thinks today is sunday Cannot recall current presidents, knows it is not daymeaghan berrios Says he is 89 (he just turned 89) Naming of high freqquency items intatc, naming of low frequency items imparied +once perseverated (called nameag a photograph then called computer mouse a photograph) Repetition intact, follows 3 step commands that cross midline Cannot spell WORLD, spells CAT fowards, cannot spell backwards (cannot draw clock nor copy pentagons due to visual impairment)  Memory 3/3 at 3 minutes Recalls breakfast this morning and how he got to hospital recalls children and their names   - Cranial Nerves:Visual accuity diminished, not intact to finger counting. Pupils nonreactive, glossed over/ EOMI; facial sensation is intact in the V1-V3 distribution bilaterally; face is symmetric with normal and smile; hearing is intact to conversation and b/l finger urb; uvula is midline and soft palate rises symmetrically; shoulder shrug intact; tongue protrudes in the midline.  - Motor/Strength Testing: - There is no pronator drift. Normal muscle bulk. +Paratonia, no clear cogwheeling  - Reflexes:  Bicep (C5/C6):                  R 1+ --- L 1+  Brachioradialis (C5/C6) :   R 1+ --- L 1+  Patella (L3/L4) :                 R 0+ --- L 0+  Ankle (S1) :                       R 0+ --- L 0+    - Sensory: Intact throughout to light touch.  - Coordination: no dysmetria, FNF limited by vision. intact finger tapping, rapid alt movements  - Gait: Short steps, slightly hunched, difficulty with step onto examination iso poor vision. Romberg negative

## 2024-02-13 NOTE — HISTORY OF PRESENT ILLNESS
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 Accompanied by two daughters. For ~1 month has had episodes of confusions, hell awake at night and usually be able to use the bathroom without issues but a few times has gotten lost, once urinating on the floor and then slipping (no HS, no LoC, hit right elbow) then difficulty getting up, describes vertigo (ground and self-spinning- which has not recurred). Has had episodes of becoming overwhelmed, episodes of wondering at night, has had hallucinations - sees small children and small animals. Occasional tremor- described as action tremor, Memory generally okay but sometimes forgetful.   Medications: Aspirin 81mg QD, Atorv 80QD, Donepezil 10mg QD, Finasteride 5mg, Tamsulosin 0.4mg qHS. Polyethylene Glycol   5/25/23: Patient briefly 87yo M PMHx cerebellar stroke on asa and statin, presents to neuro clinc with daughter. She reports patient is still sleepy throughout the day (morning, afternoon, evening) and gets irritable when awoken or when she tells him what to do. He states he is well rested. No snoring or other reasons they feel why he sleeps all day. He is mostly sedentary lifestyle (advised to be more active). His appetite, bowel/bladder is okay. Denies any new/ worsening headaches, speech changes, word finding difficulty, extremity weakness/numbness. Not currently seeing/ hearing things that are not there. Daughter had called in 4/2023 for concerns of hallucinations but they have improved since then. He has vision difficulties due to opthalmic issues and is due for surgery in one eye with limited vision.    2/23/2023 87 year old man with pmhx of cerebellar stroke on aspirin and statin presents as a follow up after starting new medication Donepezil for forgetfulness a little over a month ago at his previous appt.. Daughter accompanying patient reports his forgetfulness is about the same. She also reports sleepiness throughout the day. This started before the medication started but seems to have increased. She is concerned that he is by himself for about 5 hours after the home aid leaves and she is as work. She wants to know if we can help achieve a home aid for a longer period of the day. Patient has no new complaints and is pleasant during the exam. He has clear tearing from eyes that is bothersome to him. Daughter says they have seen an eye doctor and he is going to have a small procedure to help correct this. At baseline AO2-3, can dress (sometimes putting clothes on backwards) and feed self. Low energy during day, daytime fatigue. Has HHA for several hours a day, lives with 3 daughters  1/11/2023 87 year old Left handed man with previous history of cerebellar stroke who arrives today at the recommendation of his PCP Dr. Walter for memory loss. According to daughter, ever since April, 2022, the patient has had waxing waning episodes of forgetfulness. Example include forgetting how to play dominos with his children, heating up food in the microwave much longer than normal, and forgetting how to get to the bathroom. He complains of some lower calf discomfort when he would walk for more than one block. Denies nausea, vomiting, constipation. He had COVID in August, 2022. No recent imaging. The family said that after they moved houses in May, 2022 that it seemed like the patient's symptoms may have gotten worse.  Of note, the patient was seen in [facility] for acute dysarthria in 9/3/2018. He was found to have a stroke upon imaging which demonstrated bilateral PICA and L PCA distribution infarct, mechanism likely large vessel disease. Patient undergone physical therapy and does not have any residual deficits. The patient was started on aspirin, plavix and atorvastatin but is now only on aspirin and atorvastatin. He received a Holter monitor which demonstrated some ectopy and was started on metoprolol. Per daughter, patient has been taking his medication as prescribed.

## 2025-03-21 NOTE — ED ADULT TRIAGE NOTE - NS ED NURSE BANDS TYPE
Results for orders placed or performed in visit on 03/21/25   Cardiac EP device report    Narrative    SJM SC PM/NOT MRI CONDITIONAL  DEVICE INTERROGATED IN THE [place] OFFICE: BATTERY VOLTAGE ADEQUATE (1.3 YRS). : 27%. ALL LEAD PARAMETERS WITHIN NORMAL LIMITS. NO SIGNIFICANT HIGH RATE EPISODES. NO PROGRAMMING CHANGES MADE TO DEVICE PARAMETERS. NORMAL DEVICE FUNCTION. CH         Name band;
